# Patient Record
Sex: FEMALE | Race: BLACK OR AFRICAN AMERICAN | NOT HISPANIC OR LATINO | Employment: UNEMPLOYED | URBAN - METROPOLITAN AREA
[De-identification: names, ages, dates, MRNs, and addresses within clinical notes are randomized per-mention and may not be internally consistent; named-entity substitution may affect disease eponyms.]

---

## 2021-02-10 ENCOUNTER — ANCILLARY PROCEDURE (OUTPATIENT)
Dept: ULTRASOUND IMAGING | Facility: CLINIC | Age: 26
End: 2021-02-10
Attending: OBSTETRICS & GYNECOLOGY
Payer: COMMERCIAL

## 2021-02-10 ENCOUNTER — OFFICE VISIT (OUTPATIENT)
Dept: OBGYN | Facility: CLINIC | Age: 26
End: 2021-02-10
Attending: OBSTETRICS & GYNECOLOGY
Payer: COMMERCIAL

## 2021-02-10 VITALS
DIASTOLIC BLOOD PRESSURE: 69 MMHG | HEART RATE: 76 BPM | WEIGHT: 132.6 LBS | HEIGHT: 64 IN | BODY MASS INDEX: 22.64 KG/M2 | SYSTOLIC BLOOD PRESSURE: 120 MMHG

## 2021-02-10 DIAGNOSIS — Z23 FLU VACCINE NEED: ICD-10-CM

## 2021-02-10 DIAGNOSIS — Z34.91 NORMAL PREGNANCY IN FIRST TRIMESTER: ICD-10-CM

## 2021-02-10 DIAGNOSIS — G44.219 EPISODIC TENSION-TYPE HEADACHE, NOT INTRACTABLE: ICD-10-CM

## 2021-02-10 DIAGNOSIS — O21.9 NAUSEA AND VOMITING IN PREGNANCY: Primary | ICD-10-CM

## 2021-02-10 DIAGNOSIS — Z34.91 ENCOUNTER FOR SUPERVISION OF NORMAL PREGNANCY IN FIRST TRIMESTER, UNSPECIFIED GRAVIDITY: ICD-10-CM

## 2021-02-10 PROBLEM — G47.00 INSOMNIA: Status: ACTIVE | Noted: 2019-09-21

## 2021-02-10 PROBLEM — D64.9 ANEMIA: Status: ACTIVE | Noted: 2021-02-10

## 2021-02-10 PROBLEM — R51.9 HEADACHE: Status: ACTIVE | Noted: 2019-09-21

## 2021-02-10 PROBLEM — R06.9 ABNORMAL BREATHING: Status: ACTIVE | Noted: 2019-09-21

## 2021-02-10 LAB
ABO + RH BLD: NORMAL
ABO + RH BLD: NORMAL
BASOPHILS # BLD AUTO: 0 10E9/L (ref 0–0.2)
BASOPHILS NFR BLD AUTO: 0.4 %
BLD GP AB SCN SERPL QL: NORMAL
BLOOD BANK CMNT PATIENT-IMP: NORMAL
DIFFERENTIAL METHOD BLD: ABNORMAL
EOSINOPHIL # BLD AUTO: 0 10E9/L (ref 0–0.7)
EOSINOPHIL NFR BLD AUTO: 0.4 %
ERYTHROCYTE [DISTWIDTH] IN BLOOD BY AUTOMATED COUNT: 14.6 % (ref 10–15)
HCT VFR BLD AUTO: 34.3 % (ref 35–47)
HGB BLD-MCNC: 11 G/DL (ref 11.7–15.7)
IMM GRANULOCYTES # BLD: 0 10E9/L (ref 0–0.4)
IMM GRANULOCYTES NFR BLD: 0.2 %
LYMPHOCYTES # BLD AUTO: 1.9 10E9/L (ref 0.8–5.3)
LYMPHOCYTES NFR BLD AUTO: 36.2 %
MCH RBC QN AUTO: 28.4 PG (ref 26.5–33)
MCHC RBC AUTO-ENTMCNC: 32.1 G/DL (ref 31.5–36.5)
MCV RBC AUTO: 88 FL (ref 78–100)
MONOCYTES # BLD AUTO: 0.3 10E9/L (ref 0–1.3)
MONOCYTES NFR BLD AUTO: 5.4 %
NEUTROPHILS # BLD AUTO: 3.1 10E9/L (ref 1.6–8.3)
NEUTROPHILS NFR BLD AUTO: 57.4 %
NRBC # BLD AUTO: 0 10*3/UL
NRBC BLD AUTO-RTO: 0 /100
PLATELET # BLD AUTO: 171 10E9/L (ref 150–450)
RBC # BLD AUTO: 3.88 10E12/L (ref 3.8–5.2)
SPECIMEN EXP DATE BLD: NORMAL
WBC # BLD AUTO: 5.4 10E9/L (ref 4–11)

## 2021-02-10 PROCEDURE — 76801 OB US < 14 WKS SINGLE FETUS: CPT | Mod: 26 | Performed by: OBSTETRICS & GYNECOLOGY

## 2021-02-10 PROCEDURE — G0463 HOSPITAL OUTPT CLINIC VISIT: HCPCS | Mod: 25

## 2021-02-10 PROCEDURE — 86900 BLOOD TYPING SEROLOGIC ABO: CPT | Performed by: MIDWIFE

## 2021-02-10 PROCEDURE — 76801 OB US < 14 WKS SINGLE FETUS: CPT

## 2021-02-10 PROCEDURE — 86706 HEP B SURFACE ANTIBODY: CPT | Performed by: MIDWIFE

## 2021-02-10 PROCEDURE — G0008 ADMIN INFLUENZA VIRUS VAC: HCPCS

## 2021-02-10 PROCEDURE — 87340 HEPATITIS B SURFACE AG IA: CPT | Performed by: MIDWIFE

## 2021-02-10 PROCEDURE — 86780 TREPONEMA PALLIDUM: CPT | Performed by: MIDWIFE

## 2021-02-10 PROCEDURE — 86803 HEPATITIS C AB TEST: CPT | Performed by: MIDWIFE

## 2021-02-10 PROCEDURE — 99207 PR PRENATAL VISIT: CPT | Performed by: MIDWIFE

## 2021-02-10 PROCEDURE — 86850 RBC ANTIBODY SCREEN: CPT | Performed by: MIDWIFE

## 2021-02-10 PROCEDURE — 250N000011 HC RX IP 250 OP 636

## 2021-02-10 PROCEDURE — 82306 VITAMIN D 25 HYDROXY: CPT | Performed by: MIDWIFE

## 2021-02-10 PROCEDURE — 85025 COMPLETE CBC W/AUTO DIFF WBC: CPT | Performed by: MIDWIFE

## 2021-02-10 PROCEDURE — 83021 HEMOGLOBIN CHROMOTOGRAPHY: CPT | Performed by: MIDWIFE

## 2021-02-10 PROCEDURE — 87389 HIV-1 AG W/HIV-1&-2 AB AG IA: CPT | Performed by: MIDWIFE

## 2021-02-10 PROCEDURE — 86762 RUBELLA ANTIBODY: CPT | Performed by: MIDWIFE

## 2021-02-10 PROCEDURE — 90686 IIV4 VACC NO PRSV 0.5 ML IM: CPT

## 2021-02-10 PROCEDURE — 86901 BLOOD TYPING SEROLOGIC RH(D): CPT | Performed by: MIDWIFE

## 2021-02-10 PROCEDURE — 87086 URINE CULTURE/COLONY COUNT: CPT | Performed by: MIDWIFE

## 2021-02-10 PROCEDURE — 36415 COLL VENOUS BLD VENIPUNCTURE: CPT | Performed by: MIDWIFE

## 2021-02-10 RX ORDER — PRENATAL VIT/IRON FUM/FOLIC AC 27MG-0.8MG
1 TABLET ORAL DAILY
Qty: 90 TABLET | Refills: 3 | Status: SHIPPED | OUTPATIENT
Start: 2021-02-10 | End: 2021-05-27

## 2021-02-10 RX ORDER — PYRIDOXINE HCL (VITAMIN B6) 25 MG
25 TABLET ORAL 3 TIMES DAILY
Qty: 90 TABLET | Refills: 0 | Status: SHIPPED | OUTPATIENT
Start: 2021-02-10 | End: 2021-03-12

## 2021-02-10 RX ORDER — CHOLECALCIFEROL (VITAMIN D3) 50 MCG
1 TABLET ORAL DAILY
Qty: 90 TABLET | Refills: 3 | Status: SHIPPED | OUTPATIENT
Start: 2021-02-10 | End: 2021-05-27

## 2021-02-10 RX ORDER — ONDANSETRON 4 MG/1
4 TABLET, FILM COATED ORAL EVERY 8 HOURS PRN
Qty: 20 TABLET | Refills: 0 | Status: SHIPPED | OUTPATIENT
Start: 2021-02-10 | End: 2021-06-24

## 2021-02-10 RX ORDER — ACETAMINOPHEN 325 MG/1
650 TABLET ORAL EVERY 6 HOURS PRN
Qty: 100 TABLET | Refills: 0 | Status: ON HOLD | OUTPATIENT
Start: 2021-02-10 | End: 2021-09-05

## 2021-02-10 SDOH — HEALTH STABILITY: MENTAL HEALTH: HOW OFTEN DO YOU HAVE 6 OR MORE DRINKS ON ONE OCCASION?: NOT ASKED

## 2021-02-10 SDOH — HEALTH STABILITY: MENTAL HEALTH: HOW OFTEN DO YOU HAVE A DRINK CONTAINING ALCOHOL?: NOT ASKED

## 2021-02-10 SDOH — ECONOMIC STABILITY: TRANSPORTATION INSECURITY
IN THE PAST 12 MONTHS, HAS LACK OF TRANSPORTATION KEPT YOU FROM MEETINGS, WORK, OR FROM GETTING THINGS NEEDED FOR DAILY LIVING?: NOT ASKED

## 2021-02-10 SDOH — ECONOMIC STABILITY: FOOD INSECURITY: WITHIN THE PAST 12 MONTHS, THE FOOD YOU BOUGHT JUST DIDN'T LAST AND YOU DIDN'T HAVE MONEY TO GET MORE.: NEVER TRUE

## 2021-02-10 SDOH — ECONOMIC STABILITY: FOOD INSECURITY: WITHIN THE PAST 12 MONTHS, YOU WORRIED THAT YOUR FOOD WOULD RUN OUT BEFORE YOU GOT MONEY TO BUY MORE.: NEVER TRUE

## 2021-02-10 SDOH — HEALTH STABILITY: MENTAL HEALTH: HOW MANY STANDARD DRINKS CONTAINING ALCOHOL DO YOU HAVE ON A TYPICAL DAY?: NOT ASKED

## 2021-02-10 SDOH — ECONOMIC STABILITY: TRANSPORTATION INSECURITY
IN THE PAST 12 MONTHS, HAS THE LACK OF TRANSPORTATION KEPT YOU FROM MEDICAL APPOINTMENTS OR FROM GETTING MEDICATIONS?: NOT ASKED

## 2021-02-10 SDOH — ECONOMIC STABILITY: INCOME INSECURITY: HOW HARD IS IT FOR YOU TO PAY FOR THE VERY BASICS LIKE FOOD, HOUSING, MEDICAL CARE, AND HEATING?: NOT ASKED

## 2021-02-10 ASSESSMENT — MIFFLIN-ST. JEOR: SCORE: 1316.47

## 2021-02-10 ASSESSMENT — PAIN SCALES - GENERAL: PAINLEVEL: NO PAIN (0)

## 2021-02-10 NOTE — PROGRESS NOTES
[facility] OB Intake note  Subjective   26 year old femalepresents to clinic for initiation of OB care. Patient's last menstrual period was 2020 (exact date). No obstetric history on file. at Unknown by Estimated Date of Delivery: Sep 6, 2021 based on LMP c/w 10 week US. Reviewed dating ultrasound. Pregnancy is planned.     Partner name -  Janett.        Symptoms since LMP include nausea, vomiting and fatigue. Patient has tried these relief measures: small frequent meals, but usually vomits and increased rest. Reviewed B6 TID and Unisom HS daily. Zofran prn.     .  x3, denies complications aside from anemia during pregnancy.    - Having headaches mild- several times a week.     - Reports chest pain for the last month. Feels this after vomiting episodes only.     - Genetic/Infection questionnaire completed, risks include A.A..   Pt  does not have a recent known exposure to Parvo or CMV so IgG/IgM testing WILL NOT be ordered.   Recommended Flu Vaccine.  Flu Vaccine Given  Have you traveled during the pregnancy?No  Have your sexual partner(s) travelled during the pregnancy?No    - Current Medications    Current Outpatient Medications   Medication Sig Dispense Refill     acetaminophen (TYLENOL) 325 MG tablet Take 2 tablets (650 mg) by mouth every 6 hours as needed for mild pain 100 tablet 0     doxylamine (UNISOM SLEEPTABS) 25 MG TABS tablet Take 0.5-1 tablets (12.5-25 mg) by mouth At Bedtime 30 tablet 1     ondansetron (ZOFRAN) 4 MG tablet Take 1 tablet (4 mg) by mouth every 8 hours as needed for nausea or vomiting 20 tablet 0     Prenatal Vit-Fe Fumarate-FA (PRENATAL MULTIVITAMIN W/IRON) 27-0.8 MG tablet Take 1 tablet by mouth daily 90 tablet 3     pyridOXINE (VITAMIN B6) 25 MG tablet Take 1 tablet (25 mg) by mouth 3 times daily 90 tablet 0     vitamin D3 (CHOLECALCIFEROL) 50 mcg (2000 units) tablet Take 1 tablet (50 mcg) by mouth daily Take one tablet daily. 90 tablet 3         - Co-morbids    Past  Medical History:   Diagnosis Date     Known health problems: none      - Risk for GDM : First degree relative with GDM or DM so  WILL have an early GCT and Hgb A1C    - High risk factors for Pre E-  No known risk factors of High risk for Pre E       - Moderate risk factor for Pre E Sociodemographic characteristics (Racism, Less access given lower SES)  Meets one high risk factors or one of the moderate risk facrtors  so WILL NOT consider starting low dose aspirin (81mg) starting between 12 and 28 weeks to prevent early onset preeclampsia    - The patient  does not have a history of spontaneous  birth so  WILL NOT consider progesterone starting at 16-20 weeks and/or serial transvaginal cervical length ultrasounds from 16-24 weeks.     -The patient does not have a history of immunosuppresion or HIV so Toxoplasma IgG/IgM WILL NOT be ordered.     PERSONAL/SOCIAL HISTORY  , lives with 3 kids.  lives out of state. New dad than other three children. Feels safe, denies concerns.   Employment: Homemaker.    History of anxiety or depression  Denies  Additional items: Has been given information regarding WIC    Objective  -VS: reviewed and within normal limits   -General appearance: no acute distress, patient is comfortable   NEUROLOGICAL/PSYCHIATRIC   - Orientated x3,   -Mood and affect: : normal     Assessment/Plan  Xiao was seen today for prenatal care.    Diagnoses and all orders for this visit:    Nausea and vomiting in pregnancy  -     pyridOXINE (VITAMIN B6) 25 MG tablet; Take 1 tablet (25 mg) by mouth 3 times daily  -     doxylamine (UNISOM SLEEPTABS) 25 MG TABS tablet; Take 0.5-1 tablets (12.5-25 mg) by mouth At Bedtime  -     ondansetron (ZOFRAN) 4 MG tablet; Take 1 tablet (4 mg) by mouth every 8 hours as needed for nausea or vomiting    Normal pregnancy in first trimester  -     MAT FETAL MED CTR REFERRAL-PREGNANCY  -     Vitamin D Deficiency  -     ABO/Rh type and screen  -     CBC with  platelets differential  -     Hepatitis B surface antigen  -     Hepatitis C antibody  -     HIV Antigen Antibody Combo  -     Rubella Antibody IgG Quantitative  -     Treponema Abs w Reflex to RPR and Titer  -     Urine Culture Aerobic Bacterial  -     Hepatitis B Surface Antibody  -     pyridOXINE (VITAMIN B6) 25 MG tablet; Take 1 tablet (25 mg) by mouth 3 times daily  -     doxylamine (UNISOM SLEEPTABS) 25 MG TABS tablet; Take 0.5-1 tablets (12.5-25 mg) by mouth At Bedtime  -     ondansetron (ZOFRAN) 4 MG tablet; Take 1 tablet (4 mg) by mouth every 8 hours as needed for nausea or vomiting  -     Prenatal Vit-Fe Fumarate-FA (PRENATAL MULTIVITAMIN W/IRON) 27-0.8 MG tablet; Take 1 tablet by mouth daily  -     vitamin D3 (CHOLECALCIFEROL) 50 mcg (2000 units) tablet; Take 1 tablet (50 mcg) by mouth daily Take one tablet daily.  -     acetaminophen (TYLENOL) 325 MG tablet; Take 2 tablets (650 mg) by mouth every 6 hours as needed for mild pain  -     HGB Eval Reflex to ELP or RBC Solubility    Episodic tension-type headache, not intractable  -     acetaminophen (TYLENOL) 325 MG tablet; Take 2 tablets (650 mg) by mouth every 6 hours as needed for mild pain      26 year old No obstetric history on file. Unknown weeks of pregnancy with CARLEY of Sep 6, 2021 by LMP of Patient's last menstrual period was 11/30/2020 (exact date).. Ultrasound confirms.   Outpatient Encounter Medications as of 2/10/2021   Medication Sig Dispense Refill     acetaminophen (TYLENOL) 325 MG tablet Take 2 tablets (650 mg) by mouth every 6 hours as needed for mild pain 100 tablet 0     doxylamine (UNISOM SLEEPTABS) 25 MG TABS tablet Take 0.5-1 tablets (12.5-25 mg) by mouth At Bedtime 30 tablet 1     ondansetron (ZOFRAN) 4 MG tablet Take 1 tablet (4 mg) by mouth every 8 hours as needed for nausea or vomiting 20 tablet 0     Prenatal Vit-Fe Fumarate-FA (PRENATAL MULTIVITAMIN W/IRON) 27-0.8 MG tablet Take 1 tablet by mouth daily 90 tablet 3     pyridOXINE  (VITAMIN B6) 25 MG tablet Take 1 tablet (25 mg) by mouth 3 times daily 90 tablet 0     vitamin D3 (CHOLECALCIFEROL) 50 mcg (2000 units) tablet Take 1 tablet (50 mcg) by mouth daily Take one tablet daily. 90 tablet 3     No facility-administered encounter medications on file as of 2/10/2021.       Orders Placed This Encounter   Procedures     Vitamin D Deficiency     CBC with platelets differential     Hepatitis B surface antigen     Hepatitis C antibody     HIV Antigen Antibody Combo     Rubella Antibody IgG Quantitative     Treponema Abs w Reflex to RPR and Titer     Hepatitis B Surface Antibody     HGB Eval Reflex to ELP or RBC Solubility     MAT FETAL MED CTR REFERRAL-PREGNANCY     ABO/Rh type and screen     - Oriented to Practice, types of care, and how to reach a provider.  Pt prefers CNM team  - Patient received 1st trimester new OB education packet complete with aide of The Expectant Family booklet including information on genetic screening test options.  - Patient desires level II ultrasound which was ordered.  - Educational handout on the prevention of infections diseases during pregnancy provided.  - Patient was encouraged to start prenatal vitamins as tolerated.    - Patient was sent to lab for routine OB labs including ELP.   - Reviewed risk for diabetes in pregnancy, pt agrees to early 1 hour - plan for NOB visit.  - Pregnancy concerns to be addressed by provider at new OB exam include: early glucose screen.    Pt to RTO for NOB visit in 4 weeks and prn if questions or concerns    EDY Cho CNM

## 2021-02-10 NOTE — NURSING NOTE
Chief Complaint   Patient presents with     Prenatal Care     Pt c/o bitterness in mouth and chest pain for last month.  Pt also c/o vomiting every time she eats.

## 2021-02-10 NOTE — LETTER
2/10/2021     RE: Xiao Maguire  [address]  [address]  [place] [facility] [address]     Dear Colleague,    Thank you for referring your patient, Xiao Maguire, to the [facility] [place] at [facility]. Please see a copy of my visit note below.    [facility] OB Intake note  Subjective   26 year old femalepresents to clinic for initiation of OB care. Patient's last menstrual period was 2020 (exact date). No obstetric history on file. at Unknown by Estimated Date of Delivery: Sep 6, 2021 based on LMP c/w 10 week US. Reviewed dating ultrasound. Pregnancy is planned.     Partner name -  Janett.        Symptoms since LMP include nausea, vomiting and fatigue. Patient has tried these relief measures: small frequent meals, but usually vomits and increased rest. Reviewed B6 TID and Unisom HS daily. Zofran prn.     .  x3, denies complications aside from anemia during pregnancy.    - Having headaches mild- several times a week.     - Reports chest pain for the last month. Feels this after vomiting episodes only.     - Genetic/Infection questionnaire completed, risks include A.A..   Pt  does not have a recent known exposure to Parvo or CMV so IgG/IgM testing WILL NOT be ordered.   Recommended Flu Vaccine.  Flu Vaccine Given  Have you traveled during the pregnancy?No  Have your sexual partner(s) travelled during the pregnancy?No    - Current Medications    Current Outpatient Medications   Medication Sig Dispense Refill     acetaminophen (TYLENOL) 325 MG tablet Take 2 tablets (650 mg) by mouth every 6 hours as needed for mild pain 100 tablet 0     doxylamine (UNISOM SLEEPTABS) 25 MG TABS tablet Take 0.5-1 tablets (12.5-25 mg) by mouth At Bedtime 30 tablet 1     ondansetron (ZOFRAN) 4 MG tablet Take 1 tablet (4 mg) by mouth every 8 hours as needed for nausea or vomiting 20 tablet 0     Prenatal Vit-Fe Fumarate-FA (PRENATAL MULTIVITAMIN W/IRON)  27-0.8 MG tablet Take 1 tablet by mouth daily 90 tablet 3     pyridOXINE (VITAMIN B6) 25 MG tablet Take 1 tablet (25 mg) by mouth 3 times daily 90 tablet 0     vitamin D3 (CHOLECALCIFEROL) 50 mcg (2000 units) tablet Take 1 tablet (50 mcg) by mouth daily Take one tablet daily. 90 tablet 3         - Co-morbids    Past Medical History:   Diagnosis Date     Known health problems: none      - Risk for GDM : First degree relative with GDM or DM so  WILL have an early GCT and Hgb A1C    - High risk factors for Pre E-  No known risk factors of High risk for Pre E       - Moderate risk factor for Pre E Sociodemographic characteristics (Racism, Less access given lower SES)  Meets one high risk factors or one of the moderate risk facrtors  so WILL NOT consider starting low dose aspirin (81mg) starting between 12 and 28 weeks to prevent early onset preeclampsia    - The patient  does not have a history of spontaneous  birth so  WILL NOT consider progesterone starting at 16-20 weeks and/or serial transvaginal cervical length ultrasounds from 16-24 weeks.     -The patient does not have a history of immunosuppresion or HIV so Toxoplasma IgG/IgM WILL NOT be ordered.     PERSONAL/SOCIAL HISTORY  , lives with 3 kids.  lives out of state. New dad than other three children. Feels safe, denies concerns.   Employment: Homemaker.    History of anxiety or depression  Denies  Additional items: Has been given information regarding WIC    Objective  -VS: reviewed and within normal limits   -General appearance: no acute distress, patient is comfortable   NEUROLOGICAL/PSYCHIATRIC   - Orientated x3,   -Mood and affect: : normal     Assessment/Plan  Xiao was seen today for prenatal care.    Diagnoses and all orders for this visit:    Nausea and vomiting in pregnancy  -     pyridOXINE (VITAMIN B6) 25 MG tablet; Take 1 tablet (25 mg) by mouth 3 times daily  -     doxylamine (UNISOM SLEEPTABS) 25 MG TABS tablet; Take 0.5-1  tablets (12.5-25 mg) by mouth At Bedtime  -     ondansetron (ZOFRAN) 4 MG tablet; Take 1 tablet (4 mg) by mouth every 8 hours as needed for nausea or vomiting    Normal pregnancy in first trimester  -     MAT FETAL MED CTR REFERRAL-PREGNANCY  -     Vitamin D Deficiency  -     ABO/Rh type and screen  -     CBC with platelets differential  -     Hepatitis B surface antigen  -     Hepatitis C antibody  -     HIV Antigen Antibody Combo  -     Rubella Antibody IgG Quantitative  -     Treponema Abs w Reflex to RPR and Titer  -     Urine Culture Aerobic Bacterial  -     Hepatitis B Surface Antibody  -     pyridOXINE (VITAMIN B6) 25 MG tablet; Take 1 tablet (25 mg) by mouth 3 times daily  -     doxylamine (UNISOM SLEEPTABS) 25 MG TABS tablet; Take 0.5-1 tablets (12.5-25 mg) by mouth At Bedtime  -     ondansetron (ZOFRAN) 4 MG tablet; Take 1 tablet (4 mg) by mouth every 8 hours as needed for nausea or vomiting  -     Prenatal Vit-Fe Fumarate-FA (PRENATAL MULTIVITAMIN W/IRON) 27-0.8 MG tablet; Take 1 tablet by mouth daily  -     vitamin D3 (CHOLECALCIFEROL) 50 mcg (2000 units) tablet; Take 1 tablet (50 mcg) by mouth daily Take one tablet daily.  -     acetaminophen (TYLENOL) 325 MG tablet; Take 2 tablets (650 mg) by mouth every 6 hours as needed for mild pain  -     HGB Eval Reflex to ELP or RBC Solubility    Episodic tension-type headache, not intractable  -     acetaminophen (TYLENOL) 325 MG tablet; Take 2 tablets (650 mg) by mouth every 6 hours as needed for mild pain      26 year old No obstetric history on file. Unknown weeks of pregnancy with CARLEY of Sep 6, 2021 by LMP of Patient's last menstrual period was 11/30/2020 (exact date).. Ultrasound confirms.   Outpatient Encounter Medications as of 2/10/2021   Medication Sig Dispense Refill     acetaminophen (TYLENOL) 325 MG tablet Take 2 tablets (650 mg) by mouth every 6 hours as needed for mild pain 100 tablet 0     doxylamine (UNISOM SLEEPTABS) 25 MG TABS tablet Take 0.5-1  tablets (12.5-25 mg) by mouth At Bedtime 30 tablet 1     ondansetron (ZOFRAN) 4 MG tablet Take 1 tablet (4 mg) by mouth every 8 hours as needed for nausea or vomiting 20 tablet 0     Prenatal Vit-Fe Fumarate-FA (PRENATAL MULTIVITAMIN W/IRON) 27-0.8 MG tablet Take 1 tablet by mouth daily 90 tablet 3     pyridOXINE (VITAMIN B6) 25 MG tablet Take 1 tablet (25 mg) by mouth 3 times daily 90 tablet 0     vitamin D3 (CHOLECALCIFEROL) 50 mcg (2000 units) tablet Take 1 tablet (50 mcg) by mouth daily Take one tablet daily. 90 tablet 3     No facility-administered encounter medications on file as of 2/10/2021.       Orders Placed This Encounter   Procedures     Vitamin D Deficiency     CBC with platelets differential     Hepatitis B surface antigen     Hepatitis C antibody     HIV Antigen Antibody Combo     Rubella Antibody IgG Quantitative     Treponema Abs w Reflex to RPR and Titer     Hepatitis B Surface Antibody     HGB Eval Reflex to ELP or RBC Solubility     MAT FETAL MED CTR REFERRAL-PREGNANCY     ABO/Rh type and screen     - Oriented to Practice, types of care, and how to reach a provider.  Pt prefers CNM team  - Patient received 1st trimester new OB education packet complete with aide of The Expectant Family booklet including information on genetic screening test options.  - Patient desires level II ultrasound which was ordered.  - Educational handout on the prevention of infections diseases during pregnancy provided.  - Patient was encouraged to start prenatal vitamins as tolerated.    - Patient was sent to lab for routine OB labs including ELP.   - Reviewed risk for diabetes in pregnancy, pt agrees to early 1 hour - plan for NOB visit.  - Pregnancy concerns to be addressed by provider at new OB exam include: early glucose screen.    Pt to RTO for NOB visit in 4 weeks and prn if questions or concerns  EDY Cho CNM

## 2021-02-11 ENCOUNTER — APPOINTMENT (OUTPATIENT)
Dept: INTERPRETER SERVICES | Facility: CLINIC | Age: 26
End: 2021-02-11
Payer: COMMERCIAL

## 2021-02-11 PROBLEM — E55.9 VITAMIN D DEFICIENCY: Status: ACTIVE | Noted: 2021-02-11

## 2021-02-11 LAB
BACTERIA SPEC CULT: NO GROWTH
DEPRECATED CALCIDIOL+CALCIFEROL SERPL-MC: 21 UG/L (ref 20–75)
HBV SURFACE AB SERPL IA-ACNC: 135.12 M[IU]/ML
HBV SURFACE AG SERPL QL IA: NONREACTIVE
HCV AB SERPL QL IA: NONREACTIVE
HIV 1+2 AB+HIV1 P24 AG SERPL QL IA: NONREACTIVE
Lab: NORMAL
RUBV IGG SERPL IA-ACNC: 181 IU/ML
SPECIMEN SOURCE: NORMAL
T PALLIDUM AB SER QL: NONREACTIVE

## 2021-02-13 LAB
HGB A1 MFR BLD: 96.6 % (ref 95–97.9)
HGB A2 MFR BLD: 2.9 % (ref 2–3.5)
HGB C MFR BLD: 0 % (ref 0–0)
HGB E MFR BLD: 0 % (ref 0–0)
HGB F MFR BLD: 0.5 % (ref 0–2.1)
HGB FRACT BLD ELPH-IMP: NORMAL
HGB OTHER MFR BLD: 0 % (ref 0–0)
HGB S BLD QL SOLY: NORMAL
HGB S MFR BLD: 0 % (ref 0–0)
PATH INTERP BLD-IMP: NORMAL

## 2021-02-16 PROBLEM — O21.9 NAUSEA AND VOMITING IN PREGNANCY: Status: ACTIVE | Noted: 2021-02-16

## 2021-02-16 PROBLEM — Z34.91 NORMAL PREGNANCY IN FIRST TRIMESTER: Status: ACTIVE | Noted: 2021-02-16

## 2021-02-23 ENCOUNTER — APPOINTMENT (OUTPATIENT)
Dept: INTERPRETER SERVICES | Facility: CLINIC | Age: 26
End: 2021-02-23
Payer: COMMERCIAL

## 2021-02-25 ENCOUNTER — APPOINTMENT (OUTPATIENT)
Dept: INTERPRETER SERVICES | Facility: CLINIC | Age: 26
End: 2021-02-25
Payer: COMMERCIAL

## 2021-02-25 NOTE — RESULT ENCOUNTER NOTE
Tried to reach with [facility]  on the line. Reached voicemail. Message left asking patient to call back to S to discuss recommendation for PNV with iron and vitamin D supplement (prescriptions sent to pharmacy on 2/10). Patient also needs to schedule for RICKY Owens RN

## 2021-04-05 ENCOUNTER — PRE VISIT (OUTPATIENT)
Dept: MATERNAL FETAL MEDICINE | Facility: CLINIC | Age: 26
End: 2021-04-05

## 2021-04-14 ENCOUNTER — HOSPITAL ENCOUNTER (OUTPATIENT)
Dept: ULTRASOUND IMAGING | Facility: CLINIC | Age: 26
End: 2021-04-14
Attending: MIDWIFE
Payer: COMMERCIAL

## 2021-04-14 ENCOUNTER — OFFICE VISIT (OUTPATIENT)
Dept: MATERNAL FETAL MEDICINE | Facility: CLINIC | Age: 26
End: 2021-04-14
Attending: MIDWIFE
Payer: COMMERCIAL

## 2021-04-14 DIAGNOSIS — Z36.89 ENCOUNTER FOR FETAL ANATOMIC SURVEY: Primary | ICD-10-CM

## 2021-04-14 DIAGNOSIS — O26.90 PREGNANCY RELATED CONDITION, ANTEPARTUM: ICD-10-CM

## 2021-04-14 PROCEDURE — 76805 OB US >/= 14 WKS SNGL FETUS: CPT

## 2021-04-14 PROCEDURE — 76805 OB US >/= 14 WKS SNGL FETUS: CPT | Mod: 26 | Performed by: OBSTETRICS & GYNECOLOGY

## 2021-04-14 NOTE — PROGRESS NOTES
Please see full imaging report from ViewPoint program under imaging tab.    Normal anatomic survey.     Guillermina Owens MD  Maternal Fetal Medicine

## 2021-04-29 ENCOUNTER — OFFICE VISIT (OUTPATIENT)
Dept: OBGYN | Facility: CLINIC | Age: 26
End: 2021-04-29
Attending: ADVANCED PRACTICE MIDWIFE
Payer: COMMERCIAL

## 2021-04-29 VITALS
DIASTOLIC BLOOD PRESSURE: 72 MMHG | HEART RATE: 88 BPM | WEIGHT: 135 LBS | SYSTOLIC BLOOD PRESSURE: 109 MMHG | BODY MASS INDEX: 23.17 KG/M2

## 2021-04-29 DIAGNOSIS — E55.9 VITAMIN D DEFICIENCY: ICD-10-CM

## 2021-04-29 DIAGNOSIS — Z34.91 NORMAL PREGNANCY IN FIRST TRIMESTER: ICD-10-CM

## 2021-04-29 DIAGNOSIS — Z83.3 FAMILY HISTORY OF DIABETES MELLITUS IN MOTHER: ICD-10-CM

## 2021-04-29 DIAGNOSIS — Z34.91 ENCOUNTER FOR SUPERVISION OF NORMAL PREGNANCY IN FIRST TRIMESTER, UNSPECIFIED GRAVIDITY: Primary | ICD-10-CM

## 2021-04-29 PROCEDURE — G0463 HOSPITAL OUTPT CLINIC VISIT: HCPCS

## 2021-04-29 PROCEDURE — 99207 PR PRENATAL VISIT: CPT | Performed by: ADVANCED PRACTICE MIDWIFE

## 2021-04-29 RX ORDER — PRENATAL VIT/IRON FUM/FOLIC AC 27MG-0.8MG
1 TABLET ORAL DAILY
Qty: 90 TABLET | Refills: 3 | Status: SHIPPED | OUTPATIENT
Start: 2021-04-29 | End: 2021-05-27

## 2021-04-29 NOTE — NURSING NOTE
Chief Complaint   Patient presents with     Prenatal Care     CRYSTAL 21 weeks and 3 days   Roshni Hercules LPN

## 2021-04-29 NOTE — LETTER
"2021       RE: Xiao Maguire  [address]  [address]  [facility] [address]     Dear Colleague,    Thank you for referring your patient, Xiao Maguire, to the [facility] [place] at [facility]. Please see a copy of my visit note below.    SUBJECTIVE:    26 year old, female, , 21w3d,  who presents to the clinic today for a new ob visit.   Feels well. Has started PNV, but states that she has run out.  Estimated Date of Delivery: Sep 6, 2021   Sep 6, 2021 is calculated from Patient's last menstrual period was 2020 (exact date).  She has not had bleeding since her LMP.   She has not had nausea. Weight loss has not occurred.   This was a planned pregnancy.   FOB is involved,  States  lives out of town, they last saw each other 2 months ago. Pt came with her brother today   Has not been here since intake    OTHER CONCERNS:   Appetite - Concerned that she is not eating enough. Dizziness and headache with food.   Eats meat only. \"normal Mauritian food\"  Difficulty sleeping - difficulty falling asleep. Has not tried anything to help.  Ramadan - wondering if it is okay for her to fast.   ===========================================  ROS  PSYCHIATRIC:  Denies   PHQ9: Last PHQ-9 score on record= No Value exists for the : HP#PHQ9  Social History     Tobacco Use     Smoking status: Never Smoker     Smokeless tobacco: Never Used   Substance Use Topics     Alcohol use: Not Currently     History   Drug Use Unknown     History   Smoking Status     Never Smoker   Smokeless Tobacco     Never Used     Social History    Substance and Sexual Activity      Alcohol use: Not Currently    Family History   Problem Relation Age of Onset     Diabetes Mother      No Known Problems Father      No Known Problems Sister      No Known Problems Brother      No Known Problems Brother      No Known Problems Brother      No Known Problems Brother      No " Known Problems Brother      No Known Problems Sister      No Known Problems Sister      No Known Problems Sister      ============================================  MEDICAL HISTORY   No Known Allergies      Current Outpatient Medications:      acetaminophen (TYLENOL) 325 MG tablet, Take 2 tablets (650 mg) by mouth every 6 hours as needed for mild pain, Disp: 100 tablet, Rfl: 0     Cholecalciferol (VITAMIN D) 50 MCG (2000 UT) CAPS, Take 2 capsules by mouth daily Take two capsules daily., Disp: 180 capsule, Rfl: 3     doxylamine (UNISOM SLEEPTABS) 25 MG TABS tablet, Take 0.5-1 tablets (12.5-25 mg) by mouth At Bedtime, Disp: 30 tablet, Rfl: 1     ondansetron (ZOFRAN) 4 MG tablet, Take 1 tablet (4 mg) by mouth every 8 hours as needed for nausea or vomiting, Disp: 20 tablet, Rfl: 0     Prenatal Vit-Fe Fumarate-FA (PRENATAL MULTIVITAMIN W/IRON) 27-0.8 MG tablet, Take 1 tablet by mouth daily, Disp: 90 tablet, Rfl: 3     Prenatal Vit-Fe Fumarate-FA (PRENATAL MULTIVITAMIN W/IRON) 27-0.8 MG tablet, Take 1 tablet by mouth daily, Disp: 90 tablet, Rfl: 3     vitamin D3 (CHOLECALCIFEROL) 50 mcg (2000 units) tablet, Take 1 tablet (50 mcg) by mouth daily Take one tablet daily., Disp: 90 tablet, Rfl: 3    Past Medical History:   Diagnosis Date     Known health problems: none        Past Surgical History:   Procedure Laterality Date     NO HISTORY OF SURGERY            OB History    Para Term  AB Living   4 3 3 0 0 3   SAB TAB Ectopic Multiple Live Births   0 0 0 0 3      # Outcome Date GA Lbr Shashi/2nd Weight Sex Delivery Anes PTL Lv   4 Current            3 Term 12/04/15 40w0d  2.722 kg (6 lb) F  EPI  SIMONA   2 Term 10/22/13 40w0d  2.722 kg (6 lb) F  None  SIMONA   1 Term 12 40w0d  2.892 kg (6 lb 6 oz) M  EPI  SIMONA      Obstetric Comments   No HTN, GDM, PPD, or PPH. Anemia in pregnancy. First in [place] and 2nd and 3th in [place].        GYN History- Denies Abnormal Pap Smears; last 3 years ago per  pt.                        Cervical procedures: Denies                        History of STI: Denies    I personally reviewed the past social/family/medical and surgical history on the date of service.   I reviewed lab work done at Intake visit with patient.    OBJECTIVE:   PHYSICAL EXAM:  /72   Pulse 88   Wt 61.2 kg (135 lb)   LMP 2020 (Exact Date)   Breastfeeding No   BMI 23.17 kg/m    BMI- Body mass index is 23.17 kg/m .,     GENERAL:  Pleasant pregnant female, alert, cooperative  and well groomed.  SKIN:  Warm and dry, without lesions or rashes  HEAD: Symmetrical features.  NECK:  Thyroid without enlargement and nodules.  Lymph nodes not palpable.   LUNGS:  Clear to auscultation.  BREAST:   No dominant, fixed or suspicious masses are noted.  No skin or nipple changes or axillary nodes.  Nipples everted.      HEART:  RRR. Grade 2 murmur noted on S1  ABDOMEN: Soft without masses , tenderness or organomegaly. Uterus palpable at size equal to dates.  No scars noted. Fetal heart tones present.  MUSCULOSKELETAL:  Full range of motion  EXTREMITIES:  No edema. No significant varicosities.   PELVIC EXAM:  GENITALIA: EGBUS  External genitalia, Bartholin's glands, urethra & [place]'s glands:normal. Vulva reveals no erythema or lesions.    VAGINA:  pink, normal rugae and discharge, no lesions, good tone.   CERVIX:  Closed, firm, without CMT.             UTERUS: nontender. Measuring at 23 cm.   ADNEXA:  Without masses or tenderness.   RECTAL:  Normal appearance.  Digital exam deferred.    ASSESSMENT:  Intrauterine pregnancy 21w3d size consistent with dates  Genetic Screening: Level 2 Ultrasound completed  Encounter Diagnoses   Name Primary?     Encounter for supervision of normal pregnancy in first trimester, unspecified  Yes     Normal pregnancy in first trimester      Family history of diabetes mellitus in mother      Vitamin D deficiency         PLAN:  Orders Placed This Encounter   Medications      Prenatal Vit-Fe Fumarate-FA (PRENATAL MULTIVITAMIN W/IRON) 27-0.8 MG tablet     Sig: Take 1 tablet by mouth daily     Dispense:  90 tablet     Refill:  3     Cholecalciferol (VITAMIN D) 50 MCG (2000) CAPS     Sig: Take 2 capsules by mouth daily Take two capsules daily.     Dispense:  180 capsule     Refill:  3   Xiao was seen in clinic for NOB at 21w3d with Vietnamese  called by phone. She is feeling well overall.    - Appetite: Encouraged patient to continue to eat as she is comfortable and to add in other vegetables and fruit as tolerated.     - Fasting: Discussed a risk-benefit approach to decision making around fasting for adan. Discussed risk of hypoglycemia and dehydration to mother and fetus. Risk reduction strategies including sleeping later in the day to decrease hours without nutrition and awareness of health and snacking as needed if feeling unwell.     - Reviewed use of triage nurse line and contacting the on-call provider after hours for an urgent need such as fever, vaginal bleeding, bladder or vaginal infection, rupture of membranes,  or term labor.    - Reviewed best evidence for: weight gain for her weight and height for pregnancy:  RECOMMENDED WEIGHT GAIN: 25-35 lbs.   healthy diet and foods to avoid; exercise and activity during pregnancy;avoiding exposure to toxoplasmosis; and maintenance of a generally healthy lifestyle.   - Discussed the harms, benefits, side effects and alternative therapies for current prescribed and OTC medications.    - All pt'squestions discussed and answered.  Pt verbalized understanding of and agreement to plan of care.     - Continue scheduled prenatal care and prn if questions or concerns  Will do GCT next visit, too late for early GCT. Discussed EOB and Gct next visit    IDotty SNM, am serving as a scribe; to document services personally performed by  Maribell Pettit CNM based on data collection and the provider's statements to  me.     Dotty Thomas, BETTY      I agree with the PFSH and ROS as completed by the student, except for changes made by me. The remainder of the encounter was performed by me and scribed by the student. The scribed note accurately reflects my personal services and decisions made by me.  Maribell Pettit, DELGADO, CNM, APRN

## 2021-05-05 PROBLEM — R51.9 HEADACHE: Status: RESOLVED | Noted: 2019-09-21 | Resolved: 2021-05-05

## 2021-05-05 PROBLEM — G47.00 INSOMNIA: Status: RESOLVED | Noted: 2019-09-21 | Resolved: 2021-05-05

## 2021-05-05 PROBLEM — Z83.3 FAMILY HISTORY OF DIABETES MELLITUS IN MOTHER: Status: ACTIVE | Noted: 2021-05-05

## 2021-05-05 PROBLEM — R06.9 ABNORMAL BREATHING: Status: RESOLVED | Noted: 2019-09-21 | Resolved: 2021-05-05

## 2021-05-05 RX ORDER — MULTIVIT-MIN/IRON/FOLIC ACID/K 18-600-40
2 CAPSULE ORAL DAILY
Qty: 180 CAPSULE | Refills: 3 | Status: SHIPPED | OUTPATIENT
Start: 2021-05-05 | End: 2023-09-14

## 2021-05-27 ENCOUNTER — OFFICE VISIT (OUTPATIENT)
Dept: OBGYN | Facility: CLINIC | Age: 26
End: 2021-05-27
Attending: ADVANCED PRACTICE MIDWIFE
Payer: COMMERCIAL

## 2021-05-27 VITALS
DIASTOLIC BLOOD PRESSURE: 58 MMHG | SYSTOLIC BLOOD PRESSURE: 96 MMHG | WEIGHT: 141 LBS | HEIGHT: 64 IN | BODY MASS INDEX: 24.07 KG/M2 | HEART RATE: 81 BPM

## 2021-05-27 DIAGNOSIS — Z34.91 NORMAL PREGNANCY IN FIRST TRIMESTER: ICD-10-CM

## 2021-05-27 LAB
BASOPHILS # BLD AUTO: 0 10E9/L (ref 0–0.2)
BASOPHILS NFR BLD AUTO: 0.5 %
DIFFERENTIAL METHOD BLD: ABNORMAL
EOSINOPHIL # BLD AUTO: 0.1 10E9/L (ref 0–0.7)
EOSINOPHIL NFR BLD AUTO: 1 %
ERYTHROCYTE [DISTWIDTH] IN BLOOD BY AUTOMATED COUNT: 14.6 % (ref 10–15)
GLUCOSE 1H P 50 G GLC PO SERPL-MCNC: 129 MG/DL (ref 60–129)
HCT VFR BLD AUTO: 27 % (ref 35–47)
HGB BLD-MCNC: 8.4 G/DL (ref 11.7–15.7)
IMM GRANULOCYTES # BLD: 0.1 10E9/L (ref 0–0.4)
IMM GRANULOCYTES NFR BLD: 0.6 %
LYMPHOCYTES # BLD AUTO: 2.2 10E9/L (ref 0.8–5.3)
LYMPHOCYTES NFR BLD AUTO: 27.4 %
MCH RBC QN AUTO: 26.4 PG (ref 26.5–33)
MCHC RBC AUTO-ENTMCNC: 31.1 G/DL (ref 31.5–36.5)
MCV RBC AUTO: 85 FL (ref 78–100)
MONOCYTES # BLD AUTO: 0.4 10E9/L (ref 0–1.3)
MONOCYTES NFR BLD AUTO: 5.3 %
NEUTROPHILS # BLD AUTO: 5.2 10E9/L (ref 1.6–8.3)
NEUTROPHILS NFR BLD AUTO: 65.2 %
NRBC # BLD AUTO: 0 10*3/UL
NRBC BLD AUTO-RTO: 0 /100
PLATELET # BLD AUTO: 141 10E9/L (ref 150–450)
RBC # BLD AUTO: 3.18 10E12/L (ref 3.8–5.2)
WBC # BLD AUTO: 8 10E9/L (ref 4–11)

## 2021-05-27 PROCEDURE — 82306 VITAMIN D 25 HYDROXY: CPT | Performed by: ADVANCED PRACTICE MIDWIFE

## 2021-05-27 PROCEDURE — 85025 COMPLETE CBC W/AUTO DIFF WBC: CPT | Performed by: ADVANCED PRACTICE MIDWIFE

## 2021-05-27 PROCEDURE — 99207 PR PRENATAL VISIT: CPT | Performed by: ADVANCED PRACTICE MIDWIFE

## 2021-05-27 PROCEDURE — 82950 GLUCOSE TEST: CPT | Performed by: ADVANCED PRACTICE MIDWIFE

## 2021-05-27 PROCEDURE — 86780 TREPONEMA PALLIDUM: CPT | Performed by: ADVANCED PRACTICE MIDWIFE

## 2021-05-27 PROCEDURE — G0463 HOSPITAL OUTPT CLINIC VISIT: HCPCS

## 2021-05-27 PROCEDURE — 36415 COLL VENOUS BLD VENIPUNCTURE: CPT | Performed by: ADVANCED PRACTICE MIDWIFE

## 2021-05-27 RX ORDER — PRENATAL VIT/IRON FUM/FOLIC AC 27MG-0.8MG
1 TABLET ORAL DAILY
Qty: 90 TABLET | Refills: 3 | Status: SHIPPED | OUTPATIENT
Start: 2021-05-27 | End: 2023-10-17

## 2021-05-27 ASSESSMENT — PAIN SCALES - GENERAL: PAINLEVEL: NO PAIN (0)

## 2021-05-27 ASSESSMENT — MIFFLIN-ST. JEOR: SCORE: 1354.57

## 2021-05-27 NOTE — PROGRESS NOTES
Subjective:  26 year old, , 25w3d, presents for prenatal visit.   + fetal movement. Denies cramping/contractions. Denies leaking of fluid or vaginal bleeding.   The patient presents with the following concerns: feeling overall well   - Reports occasional dizziness when standing, resolves on own. Denies LOC.   - Questions re: CNM practice and staffing in hospital.  Is important to patient to know provider at labor/birth    No flowsheet data found.  Education completed today includes breast feeding, [facility] hand out , contraception, counting movements, signs of pre-term labor, when to present to birthplace, post partum depression, GBS, getting enough iron and labor induction.  Birth preferences reviewed: flexible, reviewed all options  Labor support:  Mother or brother, partner is out of country  [place] Feeding plans: Breastfeeding  Contraception planned: none  The following labs were ordered today: GCT, CBC w platelets, Vitamin D and Anti-treponema  Water birth consent form was not given.    Blood type:   ABO   Date Value Ref Range Status   02/10/2021 O  Final     RH(D)   Date Value Ref Range Status   02/10/2021 Pos  Final     Antibody Screen   Date Value Ref Range Status   02/10/2021 Neg  Final   Rhogam was not given.  TDAP was not given.    A/P:  Encounter Diagnosis   Name Primary?     Normal pregnancy in first trimester      Orders Placed This Encounter   Procedures     Glucose tolerance gest screen 1 hour     Vitamin D Deficiency     CBC with platelets differential     Treponema Abs w Reflex to RPR and Titer     Orders Placed This Encounter   Medications     Prenatal Vit-Fe Fumarate-FA (PRENATAL MULTIVITAMIN W/IRON) 27-0.8 MG tablet     Sig: Take 1 tablet by mouth daily     Dispense:  90 tablet     Refill:  3     Discussed on-call schedule with CNMs and options for seeing multiple providers vs. 2-3 for continuity. As patient desires to know provider at time of birth, recommended seeing multiple CNMs through  remainder of pregnancy.   Discussed potential causes of dizziness including physiologic changes in pregnancy, dehydration or hypoglycemia. Encouraged increased PO hydration and regular snacks. If symptoms persist or worsen, to return/call clinic.   Offer Tdap at next visit  Continue scheduled prenatal care, RTC in 4 weeks    EDY Moran CNM

## 2021-05-27 NOTE — LETTER
2021       RE: Xiao Maguire  [address]  [address]  [place] [address]     Dear Colleague,    Thank you for referring your patient, Xiao Maguire, to the [facility] [place] at [facility]. Please see a copy of my visit note below.    Subjective:  26 year old, , 25w3d, presents for prenatal visit.   + fetal movement. Denies cramping/contractions. Denies leaking of fluid or vaginal bleeding.   The patient presents with the following concerns: feeling overall well   - Reports occasional dizziness when standing, resolves on own. Denies LOC.   - Questions re: CNM practice and staffing in hospital.  Is important to patient to know provider at labor/birth    No flowsheet data found.  Education completed today includes breast feeding, [facility] hand out , contraception, counting movements, signs of pre-term labor, when to present to birthplace, post partum depression, GBS, getting enough iron and labor induction.  Birth preferences reviewed: flexible, reviewed all options  Labor support:  Mother or brother, partner is out of country  [place] Feeding plans: Breastfeeding  Contraception planned: none  The following labs were ordered today: GCT, CBC w platelets, Vitamin D and Anti-treponema  Water birth consent form was not given.    Blood type:   ABO   Date Value Ref Range Status   02/10/2021 O  Final     RH(D)   Date Value Ref Range Status   02/10/2021 Pos  Final     Antibody Screen   Date Value Ref Range Status   02/10/2021 Neg  Final   Rhogam was not given.  TDAP was not given.    A/P:  Encounter Diagnosis   Name Primary?     Normal pregnancy in first trimester      Orders Placed This Encounter   Procedures     Glucose tolerance gest screen 1 hour     Vitamin D Deficiency     CBC with platelets differential     Treponema Abs w Reflex to RPR and Titer     Orders Placed This Encounter   Medications     Prenatal Vit-Fe Fumarate-FA  (PRENATAL MULTIVITAMIN W/IRON) 27-0.8 MG tablet     Sig: Take 1 tablet by mouth daily     Dispense:  90 tablet     Refill:  3     Discussed on-call schedule with CNMs and options for seeing multiple providers vs. 2-3 for continuity. As patient desires to know provider at time of birth, recommended seeing multiple CNMs through remainder of pregnancy.   Discussed potential causes of dizziness including physiologic changes in pregnancy, dehydration or hypoglycemia. Encouraged increased PO hydration and regular snacks. If symptoms persist or worsen, to return/call clinic.   Offer Tdap at next visit  Continue scheduled prenatal care, RTC in 4 weeks    EDY Moran CNM

## 2021-05-28 ENCOUNTER — TELEPHONE (OUTPATIENT)
Dept: OBGYN | Facility: CLINIC | Age: 26
End: 2021-05-28

## 2021-05-28 DIAGNOSIS — D64.9 ANEMIA: Primary | ICD-10-CM

## 2021-05-28 LAB
DEPRECATED CALCIDIOL+CALCIFEROL SERPL-MC: 17 UG/L (ref 20–75)
T PALLIDUM AB SER QL: NONREACTIVE

## 2021-05-28 RX ORDER — FERROUS SULFATE 325(65) MG
325 TABLET ORAL
Qty: 180 TABLET | Refills: 1 | Status: ON HOLD | OUTPATIENT
Start: 2021-05-28 | End: 2021-09-05

## 2021-05-28 RX ORDER — RIBOFLAVIN (VITAMIN B2) 100 MG
TABLET ORAL
Qty: 90 TABLET | Refills: 1 | Status: ON HOLD | OUTPATIENT
Start: 2021-05-28 | End: 2021-09-05

## 2021-05-28 NOTE — RESULT ENCOUNTER NOTE
Called patient with [facility] , left  to call back to discuss lab results.

## 2021-06-23 NOTE — PROGRESS NOTES
"Subjective:      26 year old  at 29w3d presents for a routine prenatal appointment.     Denies cramping/contractions, vaginal bleeding, discharge or leakage of fluid. Reports +fetal movement.  No HA, vision changes, ruq/epigastric pain.      Patient concerns: Feeling well overall. Present at appointment with her little sister.   Having trouble sleeping d/t discomfort. Notes some lower abdominal and pelvic discomfort. Points to pubic bone and round ligament. Has not tried a maternity belt.     Also still feeling occasionally dizzy. Started oral iron supplementation but is now interested in IV iron infusions.     Objective:  Vitals:    21 1532   BP: (!) 88/60   Pulse: 106   Weight: 63.6 kg (140 lb 4.8 oz)   Height: 1.626 m (5' 4.02\")     See ob flowsheet    Assessment/Plan     Encounter Diagnoses   Name Primary?     Normal pregnancy in third trimester      Round ligament pain      Iron deficiency anemia secondary to inadequate dietary iron intake Yes     Normal pregnancy in first trimester      Orders Placed This Encounter   Procedures     TDAP VACCINE (Adacel, Boostrix)  [[id number]]     Asymptomatic COVID-19 Virus (Coronavirus) by PCR     Neck/Shoulder/Back/Abdomen Order for DME - ONLY FOR DME     No orders of the defined types were placed in this encounter.    - Reviewed total weight gain, encouraged continued healthy diet and exercise.  .  Reviewed importance of daily fetal kick count and why/how to contact provider.    - Reviewed why/how to contact provider if headache/visual changes/RUQ or epigastric pain, decreased fetal movement, vaginal bleeding, leakage of fluid or more than 4 contractions in an hour.     Patient education/orders or handouts today:  PTL signs/symptoms    - Reviewed EOB labs.  - Continue oral supplementation.   - Therapy orders placed for IV iron infusion (injectafer).   COVID testing order placed.  Epic message sent to RN team to reach out to pt to assist with scheduling infusions. "   - Continue vitamin D supplementation.   - Tdap administered.    Continue scheduled prenatal care, RTC in 2 weeks and prn if questions or concerns.    Scheduled for 7/8/21.     EDY Jimenez, DENIZM

## 2021-06-24 ENCOUNTER — OFFICE VISIT (OUTPATIENT)
Dept: OBGYN | Facility: CLINIC | Age: 26
End: 2021-06-24
Attending: ADVANCED PRACTICE MIDWIFE
Payer: COMMERCIAL

## 2021-06-24 VITALS
DIASTOLIC BLOOD PRESSURE: 60 MMHG | BODY MASS INDEX: 23.95 KG/M2 | HEIGHT: 64 IN | SYSTOLIC BLOOD PRESSURE: 88 MMHG | HEART RATE: 106 BPM | WEIGHT: 140.3 LBS

## 2021-06-24 DIAGNOSIS — Z34.91 NORMAL PREGNANCY IN FIRST TRIMESTER: ICD-10-CM

## 2021-06-24 DIAGNOSIS — Z34.93 NORMAL PREGNANCY IN THIRD TRIMESTER: ICD-10-CM

## 2021-06-24 DIAGNOSIS — D50.8 IRON DEFICIENCY ANEMIA SECONDARY TO INADEQUATE DIETARY IRON INTAKE: Primary | ICD-10-CM

## 2021-06-24 DIAGNOSIS — N94.9 ROUND LIGAMENT PAIN: ICD-10-CM

## 2021-06-24 PROCEDURE — 90715 TDAP VACCINE 7 YRS/> IM: CPT

## 2021-06-24 PROCEDURE — 250N000011 HC RX IP 250 OP 636

## 2021-06-24 PROCEDURE — 99207 PR PRENATAL VISIT: CPT | Performed by: ADVANCED PRACTICE MIDWIFE

## 2021-06-24 PROCEDURE — G0463 HOSPITAL OUTPT CLINIC VISIT: HCPCS | Mod: 25

## 2021-06-24 PROCEDURE — 90471 IMMUNIZATION ADMIN: CPT

## 2021-06-24 RX ORDER — HEPARIN SODIUM,PORCINE 10 UNIT/ML
5 VIAL (ML) INTRAVENOUS
Status: CANCELLED | OUTPATIENT
Start: 2021-06-28

## 2021-06-24 RX ORDER — EPINEPHRINE 1 MG/ML
0.3 INJECTION, SOLUTION, CONCENTRATE INTRAVENOUS EVERY 5 MIN PRN
Status: CANCELLED | OUTPATIENT
Start: 2021-06-28

## 2021-06-24 RX ORDER — ALBUTEROL SULFATE 5 MG/ML
2.5 SOLUTION RESPIRATORY (INHALATION)
Status: CANCELLED | OUTPATIENT
Start: 2021-06-28

## 2021-06-24 RX ORDER — METHYLPREDNISOLONE SODIUM SUCCINATE 125 MG/2ML
125 INJECTION, POWDER, LYOPHILIZED, FOR SOLUTION INTRAMUSCULAR; INTRAVENOUS
Status: CANCELLED
Start: 2021-06-28

## 2021-06-24 RX ORDER — NALOXONE HYDROCHLORIDE 0.4 MG/ML
0.2 INJECTION, SOLUTION INTRAMUSCULAR; INTRAVENOUS; SUBCUTANEOUS
Status: CANCELLED | OUTPATIENT
Start: 2021-06-28

## 2021-06-24 RX ORDER — ALBUTEROL SULFATE 90 UG/1
1-2 AEROSOL, METERED RESPIRATORY (INHALATION)
Status: CANCELLED
Start: 2021-06-28

## 2021-06-24 RX ORDER — HEPARIN SODIUM (PORCINE) LOCK FLUSH IV SOLN 100 UNIT/ML 100 UNIT/ML
5 SOLUTION INTRAVENOUS
Status: CANCELLED | OUTPATIENT
Start: 2021-06-28

## 2021-06-24 RX ORDER — MEPERIDINE HYDROCHLORIDE 25 MG/ML
25 INJECTION INTRAMUSCULAR; INTRAVENOUS; SUBCUTANEOUS EVERY 30 MIN PRN
Status: CANCELLED | OUTPATIENT
Start: 2021-06-28

## 2021-06-24 RX ORDER — DIPHENHYDRAMINE HYDROCHLORIDE 50 MG/ML
50 INJECTION INTRAMUSCULAR; INTRAVENOUS
Status: CANCELLED
Start: 2021-06-28

## 2021-06-24 ASSESSMENT — PAIN SCALES - GENERAL: PAINLEVEL: NO PAIN (0)

## 2021-06-24 ASSESSMENT — MIFFLIN-ST. JEOR: SCORE: 1351.65

## 2021-06-24 NOTE — LETTER
"2021       RE: Xiao Maguire  [address]  [address]  [place] [address]     Dear Colleague,    Thank you for referring your patient, Xiao Maguire, to the [facility] [place] at [facility]. Please see a copy of my visit note below.    Subjective:      26 year old  at 29w3d presents for a routine prenatal appointment.     Denies cramping/contractions, vaginal bleeding, discharge or leakage of fluid. Reports +fetal movement.  No HA, vision changes, ruq/epigastric pain.      Patient concerns: Feeling well overall. Present at appointment with her little sister.   Having trouble sleeping d/t discomfort. Notes some lower abdominal and pelvic discomfort. Points to pubic bone and round ligament. Has not tried a maternity belt.     Also still feeling occasionally dizzy. Started oral iron supplementation but is now interested in IV iron infusions.     Objective:  Vitals:    21 1532   BP: (!) 88/60   Pulse: 106   Weight: 63.6 kg (140 lb 4.8 oz)   Height: 1.626 m (5' 4.02\")     See ob flowsheet    Assessment/Plan     Encounter Diagnoses   Name Primary?     Normal pregnancy in third trimester      Round ligament pain      Iron deficiency anemia secondary to inadequate dietary iron intake Yes     Normal pregnancy in first trimester      Orders Placed This Encounter   Procedures     TDAP VACCINE (Adacel, Boostrix)  [[id number]]     Asymptomatic COVID-19 Virus (Coronavirus) by PCR     Neck/Shoulder/Back/Abdomen Order for DME - ONLY FOR DME     No orders of the defined types were placed in this encounter.    - Reviewed total weight gain, encouraged continued healthy diet and exercise.  .  Reviewed importance of daily fetal kick count and why/how to contact provider.    - Reviewed why/how to contact provider if headache/visual changes/RUQ or epigastric pain, decreased fetal movement, vaginal bleeding, leakage of fluid or more than 4 " contractions in an hour.     Patient education/orders or handouts today:  PTL signs/symptoms    - Reviewed EOB labs.  - Continue oral supplementation.   - Therapy orders placed for IV iron infusion (injectafer).   COVID testing order placed.  Epic message sent to RN team to reach out to pt to assist with scheduling infusions.   - Continue vitamin D supplementation.   - Tdap administered.    Continue scheduled prenatal care, RTC in 2 weeks and prn if questions or concerns.    Scheduled for 7/8/21.     EDY Jimenez, CNM

## 2021-07-12 ENCOUNTER — INFUSION THERAPY VISIT (OUTPATIENT)
Dept: INFUSION THERAPY | Facility: CLINIC | Age: 26
End: 2021-07-12
Attending: ADVANCED PRACTICE MIDWIFE
Payer: COMMERCIAL

## 2021-07-12 VITALS
HEART RATE: 80 BPM | RESPIRATION RATE: 16 BRPM | DIASTOLIC BLOOD PRESSURE: 68 MMHG | TEMPERATURE: 98.2 F | OXYGEN SATURATION: 99 % | SYSTOLIC BLOOD PRESSURE: 89 MMHG

## 2021-07-12 DIAGNOSIS — D50.8 IRON DEFICIENCY ANEMIA SECONDARY TO INADEQUATE DIETARY IRON INTAKE: Primary | ICD-10-CM

## 2021-07-12 DIAGNOSIS — Z34.91 NORMAL PREGNANCY IN FIRST TRIMESTER: ICD-10-CM

## 2021-07-12 PROCEDURE — 258N000003 HC RX IP 258 OP 636: Performed by: ADVANCED PRACTICE MIDWIFE

## 2021-07-12 PROCEDURE — 250N000011 HC RX IP 250 OP 636: Performed by: ADVANCED PRACTICE MIDWIFE

## 2021-07-12 PROCEDURE — 96365 THER/PROPH/DIAG IV INF INIT: CPT

## 2021-07-12 RX ORDER — ALBUTEROL SULFATE 90 UG/1
1-2 AEROSOL, METERED RESPIRATORY (INHALATION)
Status: CANCELLED
Start: 2021-07-19

## 2021-07-12 RX ORDER — METHYLPREDNISOLONE SODIUM SUCCINATE 125 MG/2ML
125 INJECTION, POWDER, LYOPHILIZED, FOR SOLUTION INTRAMUSCULAR; INTRAVENOUS
Status: CANCELLED
Start: 2021-07-19

## 2021-07-12 RX ORDER — HEPARIN SODIUM,PORCINE 10 UNIT/ML
5 VIAL (ML) INTRAVENOUS
Status: CANCELLED | OUTPATIENT
Start: 2021-07-19

## 2021-07-12 RX ORDER — MEPERIDINE HYDROCHLORIDE 25 MG/ML
25 INJECTION INTRAMUSCULAR; INTRAVENOUS; SUBCUTANEOUS EVERY 30 MIN PRN
Status: CANCELLED | OUTPATIENT
Start: 2021-07-19

## 2021-07-12 RX ORDER — DIPHENHYDRAMINE HYDROCHLORIDE 50 MG/ML
50 INJECTION INTRAMUSCULAR; INTRAVENOUS
Status: CANCELLED
Start: 2021-07-19

## 2021-07-12 RX ORDER — ALBUTEROL SULFATE 0.83 MG/ML
2.5 SOLUTION RESPIRATORY (INHALATION)
Status: CANCELLED | OUTPATIENT
Start: 2021-07-19

## 2021-07-12 RX ORDER — NALOXONE HYDROCHLORIDE 0.4 MG/ML
0.2 INJECTION, SOLUTION INTRAMUSCULAR; INTRAVENOUS; SUBCUTANEOUS
Status: CANCELLED | OUTPATIENT
Start: 2021-07-19

## 2021-07-12 RX ORDER — EPINEPHRINE 1 MG/ML
0.3 INJECTION, SOLUTION, CONCENTRATE INTRAVENOUS EVERY 5 MIN PRN
Status: CANCELLED | OUTPATIENT
Start: 2021-07-19

## 2021-07-12 RX ORDER — HEPARIN SODIUM (PORCINE) LOCK FLUSH IV SOLN 100 UNIT/ML 100 UNIT/ML
5 SOLUTION INTRAVENOUS
Status: CANCELLED | OUTPATIENT
Start: 2021-07-19

## 2021-07-12 RX ADMIN — FERRIC CARBOXYMALTOSE INJECTION 750 MG: 50 INJECTION, SOLUTION INTRAVENOUS at 14:12

## 2021-07-12 ASSESSMENT — PAIN SCALES - GENERAL: PAINLEVEL: NO PAIN (0)

## 2021-07-12 NOTE — PROGRESS NOTES
Infusion Nursing Note:  Xiao Maguire presents today for Injectafer.    Patient seen by provider today: No   present during visit today: Not Applicable.    Note: Injectafer infused over 15 mins with 30 min obs.      Intravenous Access:  Peripheral IV placed.    Treatment Conditions:  Not Applicable.      Post Infusion Assessment:  Patient tolerated infusion without incident.  Patient observed for 30 minutes post Injectafer per protocol.  Site patent and intact, free from redness, edema or discomfort.  No evidence of extravasations.  Access discontinued per protocol.       Discharge Plan:   Patient discharged in stable condition accompanied by: self.  Departure Mode: Ambulatory.  Next appt 7/19    Amelia Flynn RN

## 2021-07-19 ENCOUNTER — INFUSION THERAPY VISIT (OUTPATIENT)
Dept: INFUSION THERAPY | Facility: CLINIC | Age: 26
End: 2021-07-19
Attending: ADVANCED PRACTICE MIDWIFE
Payer: COMMERCIAL

## 2021-07-19 VITALS
TEMPERATURE: 98.1 F | RESPIRATION RATE: 16 BRPM | DIASTOLIC BLOOD PRESSURE: 40 MMHG | OXYGEN SATURATION: 98 % | SYSTOLIC BLOOD PRESSURE: 111 MMHG | HEART RATE: 84 BPM

## 2021-07-19 DIAGNOSIS — D50.8 IRON DEFICIENCY ANEMIA SECONDARY TO INADEQUATE DIETARY IRON INTAKE: Primary | ICD-10-CM

## 2021-07-19 DIAGNOSIS — Z34.91 NORMAL PREGNANCY IN FIRST TRIMESTER: ICD-10-CM

## 2021-07-19 PROCEDURE — 250N000011 HC RX IP 250 OP 636: Performed by: ADVANCED PRACTICE MIDWIFE

## 2021-07-19 PROCEDURE — 96365 THER/PROPH/DIAG IV INF INIT: CPT

## 2021-07-19 PROCEDURE — 258N000003 HC RX IP 258 OP 636: Performed by: ADVANCED PRACTICE MIDWIFE

## 2021-07-19 RX ORDER — HEPARIN SODIUM,PORCINE 10 UNIT/ML
5 VIAL (ML) INTRAVENOUS
Status: CANCELLED | OUTPATIENT
Start: 2021-07-19

## 2021-07-19 RX ORDER — HEPARIN SODIUM (PORCINE) LOCK FLUSH IV SOLN 100 UNIT/ML 100 UNIT/ML
5 SOLUTION INTRAVENOUS
Status: CANCELLED | OUTPATIENT
Start: 2021-07-19

## 2021-07-19 RX ORDER — ALBUTEROL SULFATE 90 UG/1
1-2 AEROSOL, METERED RESPIRATORY (INHALATION)
Status: CANCELLED
Start: 2021-07-19

## 2021-07-19 RX ORDER — MEPERIDINE HYDROCHLORIDE 25 MG/ML
25 INJECTION INTRAMUSCULAR; INTRAVENOUS; SUBCUTANEOUS EVERY 30 MIN PRN
Status: CANCELLED | OUTPATIENT
Start: 2021-07-19

## 2021-07-19 RX ORDER — METHYLPREDNISOLONE SODIUM SUCCINATE 125 MG/2ML
125 INJECTION, POWDER, LYOPHILIZED, FOR SOLUTION INTRAMUSCULAR; INTRAVENOUS
Status: CANCELLED
Start: 2021-07-19

## 2021-07-19 RX ORDER — DIPHENHYDRAMINE HYDROCHLORIDE 50 MG/ML
50 INJECTION INTRAMUSCULAR; INTRAVENOUS
Status: CANCELLED
Start: 2021-07-19

## 2021-07-19 RX ORDER — ALBUTEROL SULFATE 0.83 MG/ML
2.5 SOLUTION RESPIRATORY (INHALATION)
Status: CANCELLED | OUTPATIENT
Start: 2021-07-19

## 2021-07-19 RX ORDER — NALOXONE HYDROCHLORIDE 0.4 MG/ML
0.2 INJECTION, SOLUTION INTRAMUSCULAR; INTRAVENOUS; SUBCUTANEOUS
Status: CANCELLED | OUTPATIENT
Start: 2021-07-19

## 2021-07-19 RX ORDER — EPINEPHRINE 1 MG/ML
0.3 INJECTION, SOLUTION, CONCENTRATE INTRAVENOUS EVERY 5 MIN PRN
Status: CANCELLED | OUTPATIENT
Start: 2021-07-19

## 2021-07-19 RX ADMIN — FERRIC CARBOXYMALTOSE INJECTION 750 MG: 50 INJECTION, SOLUTION INTRAVENOUS at 14:29

## 2021-07-19 ASSESSMENT — PAIN SCALES - GENERAL: PAINLEVEL: NO PAIN (0)

## 2021-07-19 NOTE — PROGRESS NOTES
Infusion Nursing Note:  Xiao Maguire presents today for 2nd dose of injectafer.    Patient seen by provider today: No   present during visit today: Not Applicable.    Note: patient states she has more energy.      Intravenous Access:  Peripheral IV placed.    Treatment Conditions:  Not Applicable.      Post Infusion Assessment:  Patient tolerated infusion without incident.  Patient observed for 30 minutes post injectafer per protocol.  Blood return noted pre and post infusion.  Site patent and intact, free from redness, edema or discomfort.  No evidence of extravasations.  Access discontinued per protocol.       Discharge Plan:   Patient discharged in stable condition accompanied by: self.  Departure Mode: Ambulatory.  Therapy complete, no further appointments.    Stacia Dorsey RN

## 2021-08-04 ENCOUNTER — OFFICE VISIT (OUTPATIENT)
Dept: OBGYN | Facility: CLINIC | Age: 26
End: 2021-08-04
Attending: ADVANCED PRACTICE MIDWIFE
Payer: COMMERCIAL

## 2021-08-04 VITALS
SYSTOLIC BLOOD PRESSURE: 102 MMHG | HEART RATE: 89 BPM | BODY MASS INDEX: 24.77 KG/M2 | DIASTOLIC BLOOD PRESSURE: 67 MMHG | WEIGHT: 144.4 LBS

## 2021-08-04 DIAGNOSIS — D50.8 IRON DEFICIENCY ANEMIA SECONDARY TO INADEQUATE DIETARY IRON INTAKE: ICD-10-CM

## 2021-08-04 DIAGNOSIS — Z34.93 NORMAL PREGNANCY IN THIRD TRIMESTER: Primary | ICD-10-CM

## 2021-08-04 PROCEDURE — G0463 HOSPITAL OUTPT CLINIC VISIT: HCPCS

## 2021-08-04 PROCEDURE — 99207 PR PRENATAL VISIT: CPT | Performed by: ADVANCED PRACTICE MIDWIFE

## 2021-08-04 ASSESSMENT — PAIN SCALES - GENERAL: PAINLEVEL: NO PAIN (0)

## 2021-08-04 NOTE — PROGRESS NOTES
Subjective:     26 year old  at 35w2d presents for routine prenatal visit.            Denies vaginal bleeding or leakage of fluid.  Report some irregular contractions.  Reports regular fetal movement.        No HA, visual changes, RUQ or epigastric pain.   Patient concerns: Denies concerns today.  Feeling well overall.  Patient has completed IV iron.  Objective:  Vitals:    21 1507   BP: 102/67   Pulse: 89   Weight: 65.5 kg (144 lb 6.4 oz)    See OB flowsheet  Assessment/Plan     Encounter Diagnosis   Name Primary?     Normal pregnancy in third trimester Yes         Plan GBS at next visit.  Return to clinic in 1 week and prn if questions or concerns.   EDY Gonzalez CNM

## 2021-08-04 NOTE — LETTER
2021       RE: Xiao Maguire  [address]  [address]  [place] [address]     Dear Colleague,    Thank you for referring your patient, Xiao Maguire, to the [facility] [place] at [facility]. Please see a copy of my visit note below.    Subjective:     26 year old  at 35w2d presents for routine prenatal visit.            Denies vaginal bleeding or leakage of fluid.  Report some irregular contractions.  Reports regular fetal movement.        No HA, visual changes, RUQ or epigastric pain.   Patient concerns: Denies concerns today.  Feeling well overall.  Patient has completed IV iron.  Objective:  Vitals:    21 1507   BP: 102/67   Pulse: 89   Weight: 65.5 kg (144 lb 6.4 oz)    See OB flowsheet  Assessment/Plan     Encounter Diagnosis   Name Primary?     Normal pregnancy in third trimester Yes         Plan GBS at next visit.  Return to clinic in 1 week and prn if questions or concerns.   EDY Gonzalez CNM

## 2021-08-16 ENCOUNTER — ANCILLARY PROCEDURE (OUTPATIENT)
Dept: ULTRASOUND IMAGING | Facility: CLINIC | Age: 26
End: 2021-08-16
Attending: ADVANCED PRACTICE MIDWIFE
Payer: COMMERCIAL

## 2021-08-16 ENCOUNTER — OFFICE VISIT (OUTPATIENT)
Dept: OBGYN | Facility: CLINIC | Age: 26
End: 2021-08-16
Attending: ADVANCED PRACTICE MIDWIFE
Payer: COMMERCIAL

## 2021-08-16 ENCOUNTER — LAB (OUTPATIENT)
Dept: LAB | Facility: CLINIC | Age: 26
End: 2021-08-16
Attending: ADVANCED PRACTICE MIDWIFE
Payer: COMMERCIAL

## 2021-08-16 VITALS
WEIGHT: 147.4 LBS | HEIGHT: 64 IN | DIASTOLIC BLOOD PRESSURE: 66 MMHG | BODY MASS INDEX: 25.16 KG/M2 | SYSTOLIC BLOOD PRESSURE: 97 MMHG | HEART RATE: 92 BPM

## 2021-08-16 DIAGNOSIS — Z34.93 NORMAL PREGNANCY IN THIRD TRIMESTER: ICD-10-CM

## 2021-08-16 DIAGNOSIS — Z34.93 NORMAL PREGNANCY IN THIRD TRIMESTER: Primary | ICD-10-CM

## 2021-08-16 PROBLEM — D69.6 THROMBOCYTOPENIA (H): Status: ACTIVE | Noted: 2021-08-16

## 2021-08-16 LAB
ERYTHROCYTE [DISTWIDTH] IN BLOOD BY AUTOMATED COUNT: 21.3 % (ref 10–15)
HCT VFR BLD AUTO: 36.5 % (ref 35–47)
HGB BLD-MCNC: 11.7 G/DL (ref 11.7–15.7)
MCH RBC QN AUTO: 28.8 PG (ref 26.5–33)
MCHC RBC AUTO-ENTMCNC: 32.1 G/DL (ref 31.5–36.5)
MCV RBC AUTO: 90 FL (ref 78–100)
PLATELET # BLD AUTO: 103 10E3/UL (ref 150–450)
RBC # BLD AUTO: 4.06 10E6/UL (ref 3.8–5.2)
WBC # BLD AUTO: 6.9 10E3/UL (ref 4–11)

## 2021-08-16 PROCEDURE — 36415 COLL VENOUS BLD VENIPUNCTURE: CPT

## 2021-08-16 PROCEDURE — 87653 STREP B DNA AMP PROBE: CPT | Performed by: ADVANCED PRACTICE MIDWIFE

## 2021-08-16 PROCEDURE — 59426 ANTEPARTUM CARE ONLY: CPT | Performed by: ADVANCED PRACTICE MIDWIFE

## 2021-08-16 PROCEDURE — G0463 HOSPITAL OUTPT CLINIC VISIT: HCPCS

## 2021-08-16 PROCEDURE — 59025 FETAL NON-STRESS TEST: CPT | Mod: 26 | Performed by: ADVANCED PRACTICE MIDWIFE

## 2021-08-16 PROCEDURE — 85027 COMPLETE CBC AUTOMATED: CPT

## 2021-08-16 PROCEDURE — 76815 OB US LIMITED FETUS(S): CPT | Mod: 26 | Performed by: OBSTETRICS & GYNECOLOGY

## 2021-08-16 PROCEDURE — 76815 OB US LIMITED FETUS(S): CPT

## 2021-08-16 RX ORDER — ACETAMINOPHEN 500 MG
500-1000 TABLET ORAL EVERY 6 HOURS PRN
Qty: 90 TABLET | Refills: 3 | Status: ON HOLD | OUTPATIENT
Start: 2021-08-16 | End: 2021-09-05

## 2021-08-16 RX ORDER — SENNA AND DOCUSATE SODIUM 50; 8.6 MG/1; MG/1
1 TABLET, FILM COATED ORAL AT BEDTIME
Qty: 90 TABLET | Refills: 3 | Status: ON HOLD | OUTPATIENT
Start: 2021-08-16 | End: 2021-09-05

## 2021-08-16 RX ORDER — IBUPROFEN 600 MG/1
600 TABLET, FILM COATED ORAL EVERY 6 HOURS PRN
Qty: 90 TABLET | Refills: 3 | Status: ON HOLD | OUTPATIENT
Start: 2021-08-16 | End: 2021-09-05

## 2021-08-16 ASSESSMENT — MIFFLIN-ST. JEOR: SCORE: 1383.92

## 2021-08-16 NOTE — PROGRESS NOTES
"Subjective:      26 year old  at 37w0d presents for a routine prenatal appointment.         Denies vaginal bleeding,  leakage of fluid, or change in vaginal discharge.  Denies contractions.  Reports regular fetal movement.     No HA, visual changes, RUQ or epigastric pain.   Patient concerns: Low back pain started one week ago.  Has not tried any self cares.  Rx sent for Tylenol and encouraged heat/ice as needed.  Consents to GBS screening today.  Patient will RTC at 1300 for US position check.     Objective:  Vitals:    21 1106   BP: 97/66   Pulse: 92   Weight: 66.9 kg (147 lb 6.4 oz)   Height: 1.626 m (5' 4.02\")    See OB flowsheet    Assessment/Plan     Encounter Diagnosis   Name Primary?     Normal pregnancy in third trimester Yes     Orders Placed This Encounter   Procedures     CBC with Platelets         GBS screening: Obtained.  Plan CBC today.  US for position check at 1300.  Reviewed recommended postpartum OTC medication. Pt desires prescription. Tylenol, motrin and senna-docusate to have at home. Reviewed no use of motrin during pregnancy.   Birth preferences reviewed: keeping an open mind.  Labor signs discussed. Reinforced daily fetal movement counts.  Reviewed why/how to contact provider if headache/visual changes/RUQ or epigastric pain, decreased fetal movement, vaginal bleeding, leakage of fluid.   Return to clinic in 1 week and prn if questions or concerns.     EDY Gonzalez VARINDER    Addendum @ 1330  Patient returned to clinic for an ultrasound to assess fetal presentation at 1322. Found to be breech via transabdominal ultrasound and a lower HR was noted by ultrasound tech. Patient started an NST around 1330:    NST IS:  Reactive (2 accl > 15 BPM in 20 min., each lasting approx. 15 seconds)  NST Baseline Rate 115-120, Variability: moderate  Accelerations:Present  Variable Decelerations:No  Other Decelerations:No  Acoustical Stimulation Utilized:NO  Other: uterine irritability " traced, patient denied cramping or contractions  NST-REACTIVE, reviewed by ELLYN Ahmadi and writer ELLYN Ramirez.     -Patient counseled on risks and benefits of ECV vs. Scheduled  section vs. Expectant management. Patient desires expectant management at this time. She states she would like to go home and discuss with family before making any decisions about ECV.   -Provided information about spinning babies and positioning to encourage fetal rotation.   -Discussed possibility of spontaneous labor at any point due to term gestation and the inability to perform an ECV if she were to present to labor and delivery in labor or s/p SROM. Patient states understanding and is agreeable to return to clinic 21 for a repeat ultrasound.   -Encouraged to call the clinic if she has any further questions.     EDY Root CNM

## 2021-08-16 NOTE — LETTER
"2021       RE: Xiao Maguire  [address]  [address]  [place] [address]     Dear Colleague,    Thank you for referring your patient, iXao Maguire, to the [facility] [place] at [facility]. Please see a copy of my visit note below.    Subjective:      26 year old  at 37w0d presents for a routine prenatal appointment.         Denies vaginal bleeding,  leakage of fluid, or change in vaginal discharge.  Denies contractions.  Reports regular fetal movement.     No HA, visual changes, RUQ or epigastric pain.   Patient concerns: Low back pain started one week ago.  Has not tried any self cares.  Rx sent for Tylenol and encouraged heat/ice as needed.  Consents to GBS screening today.  Patient will RTC at 1300 for US position check.     Objective:  Vitals:    21 1106   BP: 97/66   Pulse: 92   Weight: 66.9 kg (147 lb 6.4 oz)   Height: 1.626 m (5' 4.02\")    See OB flowsheet    Assessment/Plan     Encounter Diagnosis   Name Primary?     Normal pregnancy in third trimester Yes     Orders Placed This Encounter   Procedures     CBC with Platelets         GBS screening: Obtained.  Plan CBC today.  US for position check at 1300.  Reviewed recommended postpartum OTC medication. Pt desires prescription. Tylenol, motrin and senna-docusate to have at home. Reviewed no use of motrin during pregnancy.   Birth preferences reviewed: keeping an open mind.  Labor signs discussed. Reinforced daily fetal movement counts.  Reviewed why/how to contact provider if headache/visual changes/RUQ or epigastric pain, decreased fetal movement, vaginal bleeding, leakage of fluid.   Return to clinic in 1 week and prn if questions or concerns.     EDY Gonzalez CNM    Addendum @ 7285  Patient returned to clinic for an ultrasound to assess fetal presentation at 1322. Found to be breech via transabdominal ultrasound and a lower HR was noted by " ultrasound tech. Patient started an NST around 1330:    NST IS:  Reactive (2 accl > 15 BPM in 20 min., each lasting approx. 15 seconds)  NST Baseline Rate 115-120, Variability: moderate  Accelerations:Present  Variable Decelerations:No  Other Decelerations:No  Acoustical Stimulation Utilized:NO  Other: uterine irritability traced, patient denied cramping or contractions  NST-REACTIVE, reviewed by ELLYN Ahmadi and writer ELLYN Ramirez.     -Patient counseled on risks and benefits of ECV vs. Scheduled  section vs. Expectant management. Patient desires expectant management at this time. She states she would like to go home and discuss with family before making any decisions about ECV.   -Provided information about spinning babies and positioning to encourage fetal rotation.   -Discussed possibility of spontaneous labor at any point due to term gestation and the inability to perform an ECV if she were to present to labor and delivery in labor or s/p SROM. Patient states understanding and is agreeable to return to clinic 21 for a repeat ultrasound.   -Encouraged to call the clinic if she has any further questions.     EDY Root CNM

## 2021-08-17 LAB
GP B STREP DNA SPEC QL NAA+PROBE: NEGATIVE
PATIENT PENICILLIN, AMOXICILLIN, CEPHALOSPORINS ALLERGY: NO

## 2021-08-20 ENCOUNTER — ANCILLARY PROCEDURE (OUTPATIENT)
Dept: ULTRASOUND IMAGING | Facility: CLINIC | Age: 26
End: 2021-08-20
Attending: ADVANCED PRACTICE MIDWIFE
Payer: COMMERCIAL

## 2021-08-20 PROCEDURE — 76815 OB US LIMITED FETUS(S): CPT

## 2021-08-20 PROCEDURE — 76815 OB US LIMITED FETUS(S): CPT | Mod: 26 | Performed by: OBSTETRICS & GYNECOLOGY

## 2021-09-04 ENCOUNTER — ANESTHESIA EVENT (OUTPATIENT)
Dept: OBGYN | Facility: CLINIC | Age: 26
End: 2021-09-04
Payer: COMMERCIAL

## 2021-09-04 ENCOUNTER — HOSPITAL ENCOUNTER (INPATIENT)
Facility: CLINIC | Age: 26
LOS: 2 days | Discharge: HOME HEALTH | End: 2021-09-06
Attending: MIDWIFE | Admitting: MIDWIFE
Payer: COMMERCIAL

## 2021-09-04 ENCOUNTER — ANESTHESIA (OUTPATIENT)
Dept: OBGYN | Facility: CLINIC | Age: 26
End: 2021-09-04
Payer: COMMERCIAL

## 2021-09-04 PROBLEM — O21.9 NAUSEA AND VOMITING IN PREGNANCY: Status: RESOLVED | Noted: 2021-02-16 | Resolved: 2021-09-04

## 2021-09-04 PROBLEM — Z36.89 ENCOUNTER FOR TRIAGE IN PREGNANT PATIENT: Status: ACTIVE | Noted: 2021-09-04

## 2021-09-04 PROBLEM — Z36.89 ENCOUNTER FOR TRIAGE IN PREGNANT PATIENT: Status: RESOLVED | Noted: 2021-09-04 | Resolved: 2021-09-04

## 2021-09-04 LAB
ABO/RH(D): NORMAL
ANTIBODY SCREEN: NEGATIVE
APTT PPP: 32 SECONDS (ref 22–38)
BASOPHILS # BLD AUTO: 0 10E3/UL (ref 0–0.2)
BASOPHILS NFR BLD AUTO: 0 %
EOSINOPHIL # BLD AUTO: 0.1 10E3/UL (ref 0–0.7)
EOSINOPHIL NFR BLD AUTO: 1 %
ERYTHROCYTE [DISTWIDTH] IN BLOOD BY AUTOMATED COUNT: 18.5 % (ref 10–15)
ERYTHROCYTE [DISTWIDTH] IN BLOOD BY AUTOMATED COUNT: 18.9 % (ref 10–15)
FIBRINOGEN PPP-MCNC: 505 MG/DL (ref 170–490)
HCT VFR BLD AUTO: 34.9 % (ref 35–47)
HCT VFR BLD AUTO: 35.8 % (ref 35–47)
HGB BLD-MCNC: 11.5 G/DL (ref 11.7–15.7)
HGB BLD-MCNC: 11.9 G/DL (ref 11.7–15.7)
HOLD SPECIMEN: NORMAL
IMM GRANULOCYTES # BLD: 0 10E3/UL
IMM GRANULOCYTES NFR BLD: 0 %
INR PPP: 1.03 (ref 0.85–1.15)
LYMPHOCYTES # BLD AUTO: 2.6 10E3/UL (ref 0.8–5.3)
LYMPHOCYTES NFR BLD AUTO: 28 %
MCH RBC QN AUTO: 29.4 PG (ref 26.5–33)
MCH RBC QN AUTO: 29.6 PG (ref 26.5–33)
MCHC RBC AUTO-ENTMCNC: 33 G/DL (ref 31.5–36.5)
MCHC RBC AUTO-ENTMCNC: 33.2 G/DL (ref 31.5–36.5)
MCV RBC AUTO: 88 FL (ref 78–100)
MCV RBC AUTO: 90 FL (ref 78–100)
MONOCYTES # BLD AUTO: 0.5 10E3/UL (ref 0–1.3)
MONOCYTES NFR BLD AUTO: 6 %
NEUTROPHILS # BLD AUTO: 6.1 10E3/UL (ref 1.6–8.3)
NEUTROPHILS NFR BLD AUTO: 65 %
NRBC # BLD AUTO: 0 10E3/UL
NRBC BLD AUTO-RTO: 0 /100
PLATELET # BLD AUTO: 110 10E3/UL (ref 150–450)
PLATELET # BLD AUTO: 98 10E3/UL (ref 150–450)
RBC # BLD AUTO: 3.89 10E6/UL (ref 3.8–5.2)
RBC # BLD AUTO: 4.05 10E6/UL (ref 3.8–5.2)
SARS-COV-2 RNA RESP QL NAA+PROBE: NEGATIVE
SPECIMEN EXPIRATION DATE: NORMAL
T PALLIDUM AB SER QL: NONREACTIVE
WBC # BLD AUTO: 9.3 10E3/UL (ref 4–11)
WBC # BLD AUTO: 9.5 10E3/UL (ref 4–11)

## 2021-09-04 PROCEDURE — 59410 OBSTETRICAL CARE: CPT | Performed by: MIDWIFE

## 2021-09-04 PROCEDURE — 370N000003 HC ANESTHESIA WARD SERVICE

## 2021-09-04 PROCEDURE — 86780 TREPONEMA PALLIDUM: CPT | Performed by: MIDWIFE

## 2021-09-04 PROCEDURE — G0463 HOSPITAL OUTPT CLINIC VISIT: HCPCS

## 2021-09-04 PROCEDURE — 85025 COMPLETE CBC W/AUTO DIFF WBC: CPT | Performed by: MIDWIFE

## 2021-09-04 PROCEDURE — 10907ZC DRAINAGE OF AMNIOTIC FLUID, THERAPEUTIC FROM PRODUCTS OF CONCEPTION, VIA NATURAL OR ARTIFICIAL OPENING: ICD-10-PCS | Performed by: MIDWIFE

## 2021-09-04 PROCEDURE — 258N000003 HC RX IP 258 OP 636

## 2021-09-04 PROCEDURE — 250N000011 HC RX IP 250 OP 636: Performed by: ANESTHESIOLOGY

## 2021-09-04 PROCEDURE — 120N000002 HC R&B MED SURG/OB UMMC

## 2021-09-04 PROCEDURE — 36415 COLL VENOUS BLD VENIPUNCTURE: CPT | Performed by: ANESTHESIOLOGY

## 2021-09-04 PROCEDURE — U0003 INFECTIOUS AGENT DETECTION BY NUCLEIC ACID (DNA OR RNA); SEVERE ACUTE RESPIRATORY SYNDROME CORONAVIRUS 2 (SARS-COV-2) (CORONAVIRUS DISEASE [COVID-19]), AMPLIFIED PROBE TECHNIQUE, MAKING USE OF HIGH THROUGHPUT TECHNOLOGIES AS DESCRIBED BY CMS-2020-01-R: HCPCS | Performed by: MIDWIFE

## 2021-09-04 PROCEDURE — 258N000003 HC RX IP 258 OP 636: Performed by: MIDWIFE

## 2021-09-04 PROCEDURE — 85610 PROTHROMBIN TIME: CPT | Performed by: ANESTHESIOLOGY

## 2021-09-04 PROCEDURE — 85730 THROMBOPLASTIN TIME PARTIAL: CPT | Performed by: ANESTHESIOLOGY

## 2021-09-04 PROCEDURE — 86901 BLOOD TYPING SEROLOGIC RH(D): CPT | Performed by: MIDWIFE

## 2021-09-04 PROCEDURE — 250N000011 HC RX IP 250 OP 636

## 2021-09-04 PROCEDURE — 250N000009 HC RX 250: Performed by: ANESTHESIOLOGY

## 2021-09-04 PROCEDURE — 36415 COLL VENOUS BLD VENIPUNCTURE: CPT | Performed by: MIDWIFE

## 2021-09-04 PROCEDURE — 85027 COMPLETE CBC AUTOMATED: CPT | Performed by: ANESTHESIOLOGY

## 2021-09-04 PROCEDURE — 85384 FIBRINOGEN ACTIVITY: CPT | Performed by: ANESTHESIOLOGY

## 2021-09-04 PROCEDURE — 722N000001 HC LABOR CARE VAGINAL DELIVERY SINGLE

## 2021-09-04 PROCEDURE — 250N000013 HC RX MED GY IP 250 OP 250 PS 637: Performed by: MIDWIFE

## 2021-09-04 PROCEDURE — 250N000009 HC RX 250

## 2021-09-04 PROCEDURE — 250N000011 HC RX IP 250 OP 636: Performed by: MIDWIFE

## 2021-09-04 PROCEDURE — 3E0R3BZ INTRODUCTION OF ANESTHETIC AGENT INTO SPINAL CANAL, PERCUTANEOUS APPROACH: ICD-10-PCS | Performed by: ANESTHESIOLOGY

## 2021-09-04 PROCEDURE — 00HU33Z INSERTION OF INFUSION DEVICE INTO SPINAL CANAL, PERCUTANEOUS APPROACH: ICD-10-PCS | Performed by: ANESTHESIOLOGY

## 2021-09-04 RX ORDER — MODIFIED LANOLIN
OINTMENT (GRAM) TOPICAL
Status: DISCONTINUED | OUTPATIENT
Start: 2021-09-04 | End: 2021-09-06 | Stop reason: HOSPADM

## 2021-09-04 RX ORDER — ONDANSETRON 4 MG/1
4 TABLET, ORALLY DISINTEGRATING ORAL EVERY 6 HOURS PRN
Status: DISCONTINUED | OUTPATIENT
Start: 2021-09-04 | End: 2021-09-04

## 2021-09-04 RX ORDER — SODIUM CHLORIDE, SODIUM LACTATE, POTASSIUM CHLORIDE, CALCIUM CHLORIDE 600; 310; 30; 20 MG/100ML; MG/100ML; MG/100ML; MG/100ML
INJECTION, SOLUTION INTRAVENOUS CONTINUOUS
Status: DISCONTINUED | OUTPATIENT
Start: 2021-09-04 | End: 2021-09-04

## 2021-09-04 RX ORDER — NALBUPHINE HYDROCHLORIDE 10 MG/ML
2.5-5 INJECTION, SOLUTION INTRAMUSCULAR; INTRAVENOUS; SUBCUTANEOUS EVERY 6 HOURS PRN
Status: DISCONTINUED | OUTPATIENT
Start: 2021-09-04 | End: 2021-09-06 | Stop reason: HOSPADM

## 2021-09-04 RX ORDER — MISOPROSTOL 200 UG/1
400 TABLET ORAL
Status: DISCONTINUED | OUTPATIENT
Start: 2021-09-04 | End: 2021-09-04

## 2021-09-04 RX ORDER — CARBOPROST TROMETHAMINE 250 UG/ML
250 INJECTION, SOLUTION INTRAMUSCULAR
Status: DISCONTINUED | OUTPATIENT
Start: 2021-09-04 | End: 2021-09-06 | Stop reason: HOSPADM

## 2021-09-04 RX ORDER — BISACODYL 10 MG
10 SUPPOSITORY, RECTAL RECTAL DAILY PRN
Status: DISCONTINUED | OUTPATIENT
Start: 2021-09-04 | End: 2021-09-06 | Stop reason: HOSPADM

## 2021-09-04 RX ORDER — SODIUM CHLORIDE, SODIUM LACTATE, POTASSIUM CHLORIDE, CALCIUM CHLORIDE 600; 310; 30; 20 MG/100ML; MG/100ML; MG/100ML; MG/100ML
INJECTION, SOLUTION INTRAVENOUS
Status: COMPLETED
Start: 2021-09-04 | End: 2021-09-04

## 2021-09-04 RX ORDER — NALOXONE HYDROCHLORIDE 0.4 MG/ML
0.4 INJECTION, SOLUTION INTRAMUSCULAR; INTRAVENOUS; SUBCUTANEOUS
Status: DISCONTINUED | OUTPATIENT
Start: 2021-09-04 | End: 2021-09-04

## 2021-09-04 RX ORDER — TRANEXAMIC ACID 10 MG/ML
1 INJECTION, SOLUTION INTRAVENOUS EVERY 30 MIN PRN
Status: DISCONTINUED | OUTPATIENT
Start: 2021-09-04 | End: 2021-09-06 | Stop reason: HOSPADM

## 2021-09-04 RX ORDER — METHYLERGONOVINE MALEATE 0.2 MG/ML
200 INJECTION INTRAVENOUS
Status: DISCONTINUED | OUTPATIENT
Start: 2021-09-04 | End: 2021-09-06 | Stop reason: HOSPADM

## 2021-09-04 RX ORDER — MISOPROSTOL 200 UG/1
800 TABLET ORAL
Status: DISCONTINUED | OUTPATIENT
Start: 2021-09-04 | End: 2021-09-04

## 2021-09-04 RX ORDER — BUPIVACAINE HYDROCHLORIDE AND EPINEPHRINE 2.5; 5 MG/ML; UG/ML
INJECTION, SOLUTION INFILTRATION; PERINEURAL PRN
Status: DISCONTINUED | OUTPATIENT
Start: 2021-09-04 | End: 2021-09-04

## 2021-09-04 RX ORDER — ACETAMINOPHEN 325 MG/1
650 TABLET ORAL EVERY 4 HOURS PRN
Status: DISCONTINUED | OUTPATIENT
Start: 2021-09-04 | End: 2021-09-06 | Stop reason: HOSPADM

## 2021-09-04 RX ORDER — OXYTOCIN 10 [USP'U]/ML
10 INJECTION, SOLUTION INTRAMUSCULAR; INTRAVENOUS
Status: DISCONTINUED | OUTPATIENT
Start: 2021-09-04 | End: 2021-09-06 | Stop reason: HOSPADM

## 2021-09-04 RX ORDER — LIDOCAINE 40 MG/G
CREAM TOPICAL
Status: DISCONTINUED | OUTPATIENT
Start: 2021-09-04 | End: 2021-09-04

## 2021-09-04 RX ORDER — NALOXONE HYDROCHLORIDE 0.4 MG/ML
0.2 INJECTION, SOLUTION INTRAMUSCULAR; INTRAVENOUS; SUBCUTANEOUS
Status: DISCONTINUED | OUTPATIENT
Start: 2021-09-04 | End: 2021-09-04

## 2021-09-04 RX ORDER — OXYTOCIN 10 [USP'U]/ML
INJECTION, SOLUTION INTRAMUSCULAR; INTRAVENOUS
Status: DISCONTINUED
Start: 2021-09-04 | End: 2021-09-05 | Stop reason: HOSPADM

## 2021-09-04 RX ORDER — METOCLOPRAMIDE HYDROCHLORIDE 5 MG/ML
10 INJECTION INTRAMUSCULAR; INTRAVENOUS EVERY 6 HOURS PRN
Status: DISCONTINUED | OUTPATIENT
Start: 2021-09-04 | End: 2021-09-04

## 2021-09-04 RX ORDER — MISOPROSTOL 200 UG/1
400 TABLET ORAL
Status: DISCONTINUED | OUTPATIENT
Start: 2021-09-04 | End: 2021-09-06 | Stop reason: HOSPADM

## 2021-09-04 RX ORDER — METHYLERGONOVINE MALEATE 0.2 MG/ML
200 INJECTION INTRAVENOUS
Status: DISCONTINUED | OUTPATIENT
Start: 2021-09-04 | End: 2021-09-04

## 2021-09-04 RX ORDER — LIDOCAINE HYDROCHLORIDE 10 MG/ML
INJECTION, SOLUTION EPIDURAL; INFILTRATION; INTRACAUDAL; PERINEURAL
Status: DISCONTINUED
Start: 2021-09-04 | End: 2021-09-05 | Stop reason: HOSPADM

## 2021-09-04 RX ORDER — OXYTOCIN/0.9 % SODIUM CHLORIDE 30/500 ML
100-340 PLASTIC BAG, INJECTION (ML) INTRAVENOUS CONTINUOUS PRN
Status: DISCONTINUED | OUTPATIENT
Start: 2021-09-04 | End: 2021-09-04

## 2021-09-04 RX ORDER — OXYTOCIN/0.9 % SODIUM CHLORIDE 30/500 ML
340 PLASTIC BAG, INJECTION (ML) INTRAVENOUS CONTINUOUS PRN
Status: DISCONTINUED | OUTPATIENT
Start: 2021-09-04 | End: 2021-09-06 | Stop reason: HOSPADM

## 2021-09-04 RX ORDER — OXYTOCIN 10 [USP'U]/ML
10 INJECTION, SOLUTION INTRAMUSCULAR; INTRAVENOUS
Status: DISCONTINUED | OUTPATIENT
Start: 2021-09-04 | End: 2021-09-04

## 2021-09-04 RX ORDER — PROCHLORPERAZINE 25 MG
25 SUPPOSITORY, RECTAL RECTAL EVERY 12 HOURS PRN
Status: DISCONTINUED | OUTPATIENT
Start: 2021-09-04 | End: 2021-09-04

## 2021-09-04 RX ORDER — IBUPROFEN 600 MG/1
600 TABLET, FILM COATED ORAL
Status: DISCONTINUED | OUTPATIENT
Start: 2021-09-04 | End: 2021-09-04

## 2021-09-04 RX ORDER — HYDROCORTISONE 2.5 %
CREAM (GRAM) TOPICAL 3 TIMES DAILY PRN
Status: DISCONTINUED | OUTPATIENT
Start: 2021-09-04 | End: 2021-09-06 | Stop reason: HOSPADM

## 2021-09-04 RX ORDER — OXYTOCIN/0.9 % SODIUM CHLORIDE 30/500 ML
PLASTIC BAG, INJECTION (ML) INTRAVENOUS
Status: COMPLETED
Start: 2021-09-04 | End: 2021-09-04

## 2021-09-04 RX ORDER — DOCUSATE SODIUM 100 MG/1
100 CAPSULE, LIQUID FILLED ORAL DAILY
Status: DISCONTINUED | OUTPATIENT
Start: 2021-09-05 | End: 2021-09-06 | Stop reason: HOSPADM

## 2021-09-04 RX ORDER — MISOPROSTOL 200 UG/1
TABLET ORAL
Status: DISCONTINUED
Start: 2021-09-04 | End: 2021-09-05 | Stop reason: HOSPADM

## 2021-09-04 RX ORDER — FENTANYL CITRATE-0.9 % NACL/PF 10 MCG/ML
100 PLASTIC BAG, INJECTION (ML) INTRAVENOUS EVERY 5 MIN PRN
Status: DISCONTINUED | OUTPATIENT
Start: 2021-09-04 | End: 2021-09-04 | Stop reason: HOSPADM

## 2021-09-04 RX ORDER — METOCLOPRAMIDE 10 MG/1
10 TABLET ORAL EVERY 6 HOURS PRN
Status: DISCONTINUED | OUTPATIENT
Start: 2021-09-04 | End: 2021-09-04

## 2021-09-04 RX ORDER — CARBOPROST TROMETHAMINE 250 UG/ML
250 INJECTION, SOLUTION INTRAMUSCULAR
Status: DISCONTINUED | OUTPATIENT
Start: 2021-09-04 | End: 2021-09-04

## 2021-09-04 RX ORDER — OXYTOCIN/0.9 % SODIUM CHLORIDE 30/500 ML
340 PLASTIC BAG, INJECTION (ML) INTRAVENOUS CONTINUOUS PRN
Status: COMPLETED | OUTPATIENT
Start: 2021-09-04 | End: 2021-09-04

## 2021-09-04 RX ORDER — FENTANYL/BUPIVACAINE/NS/PF 2-1250MCG
PLASTIC BAG, INJECTION (ML) INJECTION
Status: COMPLETED
Start: 2021-09-04 | End: 2021-09-04

## 2021-09-04 RX ORDER — KETOROLAC TROMETHAMINE 30 MG/ML
30 INJECTION, SOLUTION INTRAMUSCULAR; INTRAVENOUS
Status: DISCONTINUED | OUTPATIENT
Start: 2021-09-04 | End: 2021-09-04

## 2021-09-04 RX ORDER — OXYTOCIN/0.9 % SODIUM CHLORIDE 30/500 ML
1-24 PLASTIC BAG, INJECTION (ML) INTRAVENOUS CONTINUOUS
Status: DISCONTINUED | OUTPATIENT
Start: 2021-09-04 | End: 2021-09-04

## 2021-09-04 RX ORDER — FENTANYL CITRATE 50 UG/ML
50-100 INJECTION, SOLUTION INTRAMUSCULAR; INTRAVENOUS
Status: DISCONTINUED | OUTPATIENT
Start: 2021-09-04 | End: 2021-09-04

## 2021-09-04 RX ORDER — ONDANSETRON 2 MG/ML
4 INJECTION INTRAMUSCULAR; INTRAVENOUS EVERY 6 HOURS PRN
Status: DISCONTINUED | OUTPATIENT
Start: 2021-09-04 | End: 2021-09-04

## 2021-09-04 RX ORDER — PROCHLORPERAZINE MALEATE 10 MG
10 TABLET ORAL EVERY 6 HOURS PRN
Status: DISCONTINUED | OUTPATIENT
Start: 2021-09-04 | End: 2021-09-04

## 2021-09-04 RX ORDER — MISOPROSTOL 200 UG/1
800 TABLET ORAL
Status: DISCONTINUED | OUTPATIENT
Start: 2021-09-04 | End: 2021-09-06 | Stop reason: HOSPADM

## 2021-09-04 RX ORDER — TRANEXAMIC ACID 10 MG/ML
1 INJECTION, SOLUTION INTRAVENOUS EVERY 30 MIN PRN
Status: DISCONTINUED | OUTPATIENT
Start: 2021-09-04 | End: 2021-09-04

## 2021-09-04 RX ORDER — IBUPROFEN 800 MG/1
800 TABLET, FILM COATED ORAL EVERY 6 HOURS PRN
Status: DISCONTINUED | OUTPATIENT
Start: 2021-09-05 | End: 2021-09-06 | Stop reason: HOSPADM

## 2021-09-04 RX ADMIN — SODIUM CHLORIDE, POTASSIUM CHLORIDE, SODIUM LACTATE AND CALCIUM CHLORIDE: 600; 310; 30; 20 INJECTION, SOLUTION INTRAVENOUS at 19:00

## 2021-09-04 RX ADMIN — Medication: at 14:43

## 2021-09-04 RX ADMIN — ACETAMINOPHEN 650 MG: 325 TABLET, FILM COATED ORAL at 23:42

## 2021-09-04 RX ADMIN — IBUPROFEN 600 MG: 600 TABLET ORAL at 21:42

## 2021-09-04 RX ADMIN — SODIUM CHLORIDE, POTASSIUM CHLORIDE, SODIUM LACTATE AND CALCIUM CHLORIDE 500 ML: 600; 310; 30; 20 INJECTION, SOLUTION INTRAVENOUS at 14:17

## 2021-09-04 RX ADMIN — FENTANYL CITRATE 50 MCG: 50 INJECTION INTRAMUSCULAR; INTRAVENOUS at 12:53

## 2021-09-04 RX ADMIN — Medication 340 ML/HR: at 21:44

## 2021-09-04 RX ADMIN — BUPIVACAINE HYDROCHLORIDE AND EPINEPHRINE BITARTRATE 10 ML: 2.5; .005 INJECTION, SOLUTION INFILTRATION; PERINEURAL at 20:38

## 2021-09-04 RX ADMIN — FENTANYL CITRATE 50 MCG: 50 INJECTION INTRAMUSCULAR; INTRAVENOUS at 11:33

## 2021-09-04 RX ADMIN — Medication: at 19:32

## 2021-09-04 ASSESSMENT — MIFFLIN-ST. JEOR: SCORE: 1380.11

## 2021-09-04 NOTE — PLAN OF CARE
D: Patient is comfortable with her epidural. Multiple position changes provided and AROM performed at 1800 with clear fluid. Ileana to recheck her cervix at 1900 to see if pitocin is needed. P: Continue to monitor.

## 2021-09-04 NOTE — PROVIDER NOTIFICATION
09/04/21 1400   Provider Notification   Provider Name/Title Ileana CNM   Method of Notification At Bedside   Notification Reason Pain;SVE   D: Patient requesting an epidural for pain. Ileana called to the room to assess cervix. IV bolus started. P: Continue to monitor.

## 2021-09-04 NOTE — PROVIDER NOTIFICATION
09/04/21 0750   Provider Notification   Provider Name/Title Ileana De La Garza CNM   Method of Notification Electronic Page   Notification Reason Patient Arrived   D: Patient arrived to labor and delivery with her mother stating contractions every couple of minutes that started last night and got stronger at 0300 this morning. Denies ROM, bleeding. Placed on monitor, admission completed and CNM notified of arrival and lower fetal heart rate. P: Continue to monitor.

## 2021-09-04 NOTE — PROGRESS NOTES
"Labor progress note    S:  Pt has been comfortable with epidural  Resting  Is agreeable to cervix exam and plan of care discussion  Discussed rupturing membranes to faciliate labor  Progress has been slow     O:  Blood pressure 99/55, temperature 98.3  F (36.8  C), temperature source Oral, resp. rate 16, height 1.62 m (5' 3.78\"), weight 66.9 kg (147 lb 6.4 oz), last menstrual period 2020, SpO2 98 %, not currently breastfeeding.  General appearance: comfortable.  Contractions: Every 3-4 minutes. 60-70 seconds duration.  Palpate: moderate.  FHT: Baseline 120 with mod  variability. Accelerations are present. No  decelerations present.  AROM: clear fluid.  Pelvic exam: 7/ 100%/ Mid/ soft/ -1  A:   IUP @ 39w5d active labor protracted progress   Fetal Heart rate tracing Category category one  GBS- negative  Patient Active Problem List   Diagnosis     Anemia     Vitamin D deficiency     Normal pregnancy in first trimester     Family history of diabetes mellitus in mother     Thrombocytopenia (H)     Labor and delivery, indication for care         P:  Pain medication epidural   Anticipate   Labor augmentation with AROM   Consider IV pitocin in 1-2 hours if minimal progress or spaced contractions   reevaluate in 2-4 hours/PRN     EDY Mclain CNM  "

## 2021-09-04 NOTE — ANESTHESIA PROCEDURE NOTES
Epidural catheter Procedure Note  Pre-Procedure   Staff -        Anesthesiologist:  Shannon Gonzalez MD       Performed By: anesthesiologist       Procedure Start/Stop Times: 9/4/2021 2:36 PM and 9/4/2021 2:59 PM       Pre-Anesthestic Checklist: patient identified, IV checked, risks and benefits discussed, informed consent, monitors and equipment checked, pre-op evaluation, at physician/surgeon's request and post-op pain management  Timeout:       Correct Patient: Yes        Correct Procedure: Yes        Correct Site: Yes        Correct Position: Yes   Procedure Documentation  Procedure: epidural catheter       Patient Position: sitting       Patient Prep/Sterile Barriers: sterile gloves, mask, patient draped       Skin prep: Chloraprep       Local skin infiltrated with 3 mL of 1% lidocaine.  (midline approach).       Technique: LORT saline        SIMONE at 5 cm.       Needle Type: [employer] needle       Needle Gauge: 17.        Needle Length (Inches): 3.5        Catheter: 19 G.         Catheter threaded easily.         5 cm epidural space.         Threaded 10 cm at skin.         # of attempts: 2 and  # of redirects:  1    Assessment/Narrative         Paresthesias: No.       Test dose of 3 mL lidocaine 1.5% w/ 1:200,000 epinephrine at 15:40 CDT.         Test dose negative, 3 minutes after injection, for signs of intravascular, subdural, or intrathecal injection.       Insertion/Infusion Method: LORT saline       Aspiration negative for Heme or CSF via Epidural Catheter.    Medication(s) Administered   0.125% Bupivacaine + 2 mcg/mL Fentanyl via CADD (Epidural), 8 mL  Medication Administration Time: 9/4/2021 2:50 PM

## 2021-09-04 NOTE — PROGRESS NOTES
"Labor progress note    S:  Pt is more uncomfortable  Had fentanyl x 1 at 1130  Coping well     O:  Blood pressure 111/57, temperature 98  F (36.7  C), temperature source Oral, resp. rate 16, height 1.62 m (5' 3.78\"), weight 66.9 kg (147 lb 6.4 oz), last menstrual period 2020, not currently breastfeeding.  General appearance: uncomfortable with contractions.  Contractions: Every 3 minutes. 60-70 seconds duration.  Palpate: moderate.  FHT: Baseline 115 with mod variability. Accelerations arepresent. no decelerations present.  ROM: not ruptured.   Pelvic exam: 5/ 80%/ Mid/ soft/ -2  A:  IUP @ 39w5d early labor   Fetal Heart rate tracing Category category one  GBS- negative  Patient Active Problem List   Diagnosis     Anemia     Vitamin D deficiency     Normal pregnancy in first trimester     Family history of diabetes mellitus in mother     Thrombocytopenia (H)     Labor and delivery, indication for care         P:  comfort measures prn   Pain medication fentanyl   Anticipate   MD consultant on call / available prn  reevaluate in 2-4 hours/PRN     EDY MclainM  "

## 2021-09-04 NOTE — PROGRESS NOTES
"Labor progress note    S:  Pt is more uncomfortable  Requesting cervical check  Requesting epidural  Has been upright changing positions walking in room  Mom at her side     O:  Blood pressure 111/57, temperature 98  F (36.7  C), temperature source Oral, resp. rate 16, height 1.62 m (5' 3.78\"), weight 66.9 kg (147 lb 6.4 oz), last menstrual period 2020, not currently breastfeeding.  General appearance: uncomfortable with contractions.  Contractions: Every 2-3 minutes. 60-80 seconds duration.  Palpate: moderate.  FHT: Baseline 120 with mod  variability. Accelerations are present. No  decelerations present.  ROM: not ruptured.   Pelvic exam: 6/ 90%/ Mid/ soft/ -2 BBOW   Platelets are 111   A:  IUP @ 39w5d active labor   Fetal Heart rate tracing Category category one  GBS- negative  Patient Active Problem List   Diagnosis     Anemia     Vitamin D deficiency     Normal pregnancy in first trimester     Family history of diabetes mellitus in mother     Thrombocytopenia (H)     Labor and delivery, indication for care         P:  Pain medication epidural now   Anticipate   MD consultant on call Dr Manuel / available prn  reevaluate in 2-4 hours/PRN     EDY Mclain CNM  "

## 2021-09-04 NOTE — ANESTHESIA PREPROCEDURE EVALUATION
Anesthesia Pre-Procedure Evaluation    Patient: Xiao Maguire   MRN: [id number] : 1995        Preoperative Diagnosis: * No surgery found *   Procedure :      Past Medical History:   Diagnosis Date     Known health problems: none       Past Surgical History:   Procedure Laterality Date     NO HISTORY OF SURGERY        No Known Allergies   Social History     Tobacco Use     Smoking status: Never Smoker     Smokeless tobacco: Never Used   Substance Use Topics     Alcohol use: Not Currently      Wt Readings from Last 1 Encounters:   21 66.9 kg (147 lb 6.4 oz)        Anesthesia Evaluation   Pt has not had prior anesthetic         ROS/MED HX  ENT/Pulmonary:  - neg pulmonary ROS     Neurologic:  - neg neurologic ROS     Cardiovascular:  - neg cardiovascular ROS     METS/Exercise Tolerance:     Hematologic:     (+) thrombocytopenia (plts 110), anemia,     Musculoskeletal:       GI/Hepatic:       Renal/Genitourinary:       Endo:  - neg endo ROS     Psychiatric/Substance Use:       Infectious Disease:       Malignancy:       Other:            Physical Exam    Airway        Mallampati: II   TM distance: > 3 FB   Neck ROM: full   Mouth opening: > 3 cm    Respiratory Devices and Support         Dental  no notable dental history         Cardiovascular   cardiovascular exam normal          Pulmonary   pulmonary exam normal                OUTSIDE LABS:  CBC:   CBC RESULTS: Recent Labs   Lab Test 21  0829 21  1420 21  1620 02/10/21  1601   WBC 9.3 6.9 8.0 5.4   HGB 11.9 11.7 8.4* 11.0*   HCT 35.8 36.5 27.0* 34.3*   * 103* 141* 171       Lab Results   Component Value Date    WBC 9.3 2021    WBC 6.9 2021    HGB 11.9 2021    HGB 11.7 2021    HCT 35.8 2021    HCT 36.5 2021     (L) 2021     (L) 2021     BMP: No results found for: NA, POTASSIUM, CHLORIDE, CO2, BUN, CR, GLC  COAGS: No results found for: PTT, INR, FIBR  POC: No results  found for: BGM, HCG, HCGS  HEPATIC: No results found for: ALBUMIN, PROTTOTAL, ALT, AST, GGT, ALKPHOS, BILITOTAL, BILIDIRECT, RUPAL  OTHER: No results found for: PH, LACT, A1C, GISSELLE, PHOS, MAG, LIPASE, AMYLASE, TSH, T4, T3, CRP, SED    Anesthesia Plan    ASA Status:  2      Anesthesia Type: Epidural.              Consents    Anesthesia Plan(s) and associated risks, benefits, and realistic alternatives discussed. Questions answered and patient/representative(s) expressed understanding.     - Discussed with:  Patient         Postoperative Care            Comments:    Risks of epidural explained including risk of postdural puncture headache, hypotension, nausea/vommiting, infection, nerve injury, failed block/need for catheter replacement, and rare events including serious nerve injury paralysis or high block leading to cardiopulmonary arrest.         neg OB ROS.       Shannon Gonzalez MD

## 2021-09-04 NOTE — H&P
"ADMIT NOTE  =================  39w5d    Xiao Maguire is a 26 year old female with an Patient's last menstrual period was 2020 (exact date). and Estimated Date of Delivery: Sep 6, 2021 [facility] 2021 at 8:20 AM in early labor.     HPI  ================  Pt presents with family member reports onset of stronger regular contractions [id number]   Denies fever, cough, SOB or chest pain. Denies having contact with anyone who is Covid-19 positive. Agreeable to Covid-19 testing  Contractions- every 3-4 minutes  Fetal movement- active  ROM- no.  Vaginal bleeding- none  GBS- negative  FOB- is involved,   Other labor support- female family member   Weight gain- 147 - 132 lbs, Total weight gain- 15 lbs  Height- 5'4\"  BMI- 22  First prenatal visit at 10 weeks, Total visits- 6    PROBLEM LIST  =================  Patient Active Problem List    Diagnosis Date Noted     Labor and delivery, indication for care 2021     Priority: Medium     Thrombocytopenia (H) 2021     Priority: Medium     21: plts 103, needs weekly CBC. Consider anesthesia consult if less than 100.        Family history of diabetes mellitus in mother 2021     Priority: Medium     NEeds early GCT--pt returned to care at 22 wks--too late for early GCT, will come in a few weeks for visit andGCT  EFW at 20wk--93%       Normal pregnancy in first trimester 2021     Priority: Medium     [facility] CNM pt  Partner's name: Janett   [X] Entered on Epic list  [X] NOB folder  [X] Dating LMP c/w 10 week US  [X] First tri screen declined  [ ] QS/AFP ordered declined  [X] Fetal anatomy US ordered  [x ] Rubella immune  [x ] Hep B immune    [NA ] Started ASA   [x ] NO  plan utox in labor   _____________________________________  [ ] EOB folder  [ ] PP Contraception plan: If tubal,consent date:  [ ] Labor plans:  [ ] :  [ ] Infant feeding plan  [ ] FLU shot  [ ] TDAP given    [ ] Waterbirth declines, consent " done  [ ] GCT, passed  ________________________________________  [ ] GBS pos; neg  [ ] OTC PP meds sent  [ ] Planning CS-ERAS pkt    Anatomy scan: 18wks: EFW 346g   93%        Hadlock         Vitamin D deficiency 2021     Priority: Medium     21- pt was prescribed 2,000 at intake. Notify at next appt she should take 4,000 daily.        Anemia 02/10/2021     Priority: Medium     21: Has started IV iron transfusion  21: hgb 11.7         HISTORIES  ============  No Known Allergies  Past Medical History:   Diagnosis Date     Known health problems: none      Past Surgical History:   Procedure Laterality Date     NO HISTORY OF SURGERY     .  Family History   Problem Relation Age of Onset     Diabetes Mother      No Known Problems Father      No Known Problems Sister      No Known Problems Brother      No Known Problems Brother      No Known Problems Brother      No Known Problems Brother      No Known Problems Brother      No Known Problems Sister      No Known Problems Sister      No Known Problems Sister      Social History     Tobacco Use     Smoking status: Never Smoker     Smokeless tobacco: Never Used   Substance Use Topics     Alcohol use: Not Currently     OB History    Para Term  AB Living   4 3 3 0 0 3   SAB TAB Ectopic Multiple Live Births   0 0 0 0 3      # Outcome Date GA Lbr Shashi/2nd Weight Sex Delivery Anes PTL Lv   4 Current            3 Term 12/04/15 40w0d  2.722 kg (6 lb) F  EPI  [facility]   2 Term 10/22/13 40w0d  2.722 kg (6 lb) F  None  [facility]   1 Term 12 40w0d  2.892 kg (6 lb 6 oz) M  EPI  SIMONA      Obstetric Comments   No HTN, GDM, PPD, or PPH. Anemia in pregnancy. First in [place] and 2nd and 3th in [place].         LABS:   ===========  Prenatal Labs:  Rhogam not indicated   Lab Results   Component Value Date    ABO O 02/10/2021    RH Pos 02/10/2021    AS Neg 02/10/2021    RUQIGG 181 02/10/2021    HEPBANG Nonreactive 02/10/2021    HGB 11.7 2021     HIAGAB Nonreactive 02/10/2021    GLU1 129 05/27/2021     Rubella immune  : GBS  NEG   Other labs:  COVID-19 PCR Results    COVID-19 PCR Results   No data to display.         COVID-19 Antibody Results, Testing for Immunity    COVID-19 Antibody Results, Testing for Immunity   No data to display.            No results found for this or any previous visit (from the past 24 hour(s)).    ROS  =========  Pt denies significant respiratory, cardiovacular, GI, or muscular/skeletalcomplaints.    See RN data base ROS.       PHYSICAL EXAM:  ===============  LMP 11/30/2020 (Exact Date)   General appearance: uncomfortable with contractions  GENERAL APPEARANCE: healthy, alert and no distress  RESP: lungs clear to auscultation - no rales, rhonchi or wheezes  CV: regular rates and rhythm, normal S1 S2, no S3 or S4 and no murmur,and no varicosities  ABDOMEN:  soft, nontender, no epigastric pain  SKIN: no suspicious lesions or rashes  NEURO: Denies headache, blurred vision, other vision changes  PSYCH: mentation appears normal. and affect normal/bright  Legs: Reflexes normal bilaterally     Abdomen: gravid, vertex fetus per Leopold's, non-tender between contractions.   Cephalic presentation confirmed by BSUS  EFW-  7 3/4 lbs.   CONTRACTIONS: every 2-4 minutes  FETAL HEART TONES: continuous EFM- baseline 115 with moderate variability and positive accelerations. No decelerations.  PELVIC EXAM: 3/ 75%/ Posterior/ soft/ -2 BBOW   BLOODY SHOW: no   ROM:no  FLUID: none  ROMPLUS: not done    # Pain Assessment:   - Xiao is experiencing pain due to labor . Pain management was discussed and the plan was created in a collaborative fashion.  Xiao's response to the current recommendations: engaged  Open to pain meds when requests           ASSESSMENT:  ==============  IUP @ 39w5d admitted in early labor   NST REACTIVE baseline 115   Fetal Heart Tones - category one  GBS- negative  Covid- pending    Patient Active Problem List   Diagnosis      Anemia     Vitamin D deficiency     Normal pregnancy in first trimester     Family history of diabetes mellitus in mother     Thrombocytopenia (H)     Labor and delivery, indication for care       PLAN:  ===========  Admit - see IP orders  MD consultant on call Jose / available prn  Ambulation, hydration, position changes, birthing ball and tub options to facilitate labor reviewed with pt .  Anticipate   EDY MclainM

## 2021-09-05 LAB — HGB BLD-MCNC: 11.1 G/DL (ref 11.7–15.7)

## 2021-09-05 PROCEDURE — 85018 HEMOGLOBIN: CPT | Performed by: MIDWIFE

## 2021-09-05 PROCEDURE — 120N000002 HC R&B MED SURG/OB UMMC

## 2021-09-05 PROCEDURE — 250N000013 HC RX MED GY IP 250 OP 250 PS 637: Performed by: MIDWIFE

## 2021-09-05 PROCEDURE — 36415 COLL VENOUS BLD VENIPUNCTURE: CPT | Performed by: MIDWIFE

## 2021-09-05 RX ORDER — IBUPROFEN 200 MG
600 TABLET ORAL EVERY 6 HOURS PRN
Qty: 50 TABLET | Refills: 0 | Status: SHIPPED | OUTPATIENT
Start: 2021-09-05 | End: 2023-09-14

## 2021-09-05 RX ORDER — ACETAMINOPHEN 325 MG/1
650 TABLET ORAL EVERY 4 HOURS PRN
Qty: 50 TABLET | Refills: 0 | Status: SHIPPED | OUTPATIENT
Start: 2021-09-05 | End: 2023-09-14

## 2021-09-05 RX ORDER — DOCUSATE SODIUM 100 MG/1
100 CAPSULE, LIQUID FILLED ORAL DAILY PRN
Qty: 20 CAPSULE | Refills: 0 | Status: SHIPPED | OUTPATIENT
Start: 2021-09-05 | End: 2023-09-14

## 2021-09-05 RX ADMIN — ACETAMINOPHEN 650 MG: 325 TABLET, FILM COATED ORAL at 07:59

## 2021-09-05 RX ADMIN — IBUPROFEN 800 MG: 800 TABLET, FILM COATED ORAL at 18:28

## 2021-09-05 RX ADMIN — DOCUSATE SODIUM 100 MG: 100 CAPSULE, LIQUID FILLED ORAL at 07:59

## 2021-09-05 RX ADMIN — ACETAMINOPHEN 650 MG: 325 TABLET, FILM COATED ORAL at 21:48

## 2021-09-05 RX ADMIN — IBUPROFEN 800 MG: 800 TABLET, FILM COATED ORAL at 03:46

## 2021-09-05 RX ADMIN — ACETAMINOPHEN 650 MG: 325 TABLET, FILM COATED ORAL at 03:46

## 2021-09-05 RX ADMIN — IBUPROFEN 800 MG: 800 TABLET, FILM COATED ORAL at 10:27

## 2021-09-05 RX ADMIN — ACETAMINOPHEN 650 MG: 325 TABLET, FILM COATED ORAL at 14:28

## 2021-09-05 NOTE — PLAN OF CARE
of viable baby boy with Ileana De La Garza CNM in attendance.  NICU present due to recurrent decels. Nursery RN Stacia present.  Infant with spontaneous cry, to mothers abdomen, dried and stimulated.  Apgars 8 and 9.  Placenta delivered without complication, Pitocin bolused, minor laceration no repair needed, deo cares provided.  Mother and baby in stable condition.

## 2021-09-05 NOTE — L&D DELIVERY NOTE
Delivery Summary    Xiao Maguire MRN# [id number]   Age: 26 year old YOB: 1995   Delivery Note  Pt presented in early labor   IUP at 39 weeks gestation delivered on 2021.     delivery of a viable Male infant.  Weight : 8 pounds 1 ounces   Apgars of 8 at 1 minute and 9 at 5 minutes.  Labor was augmented. AROM   Medications administered  in labor:  Pain Rx narcotics  and Epidural; Antibiotics No;   Perineum: Minor laceration - No repair  Placenta-mechanism: spontaneous, intact,  with a 3 vessel cord. IV oxytocin was given.  Estimated Blood Loss was 100.  Complications of pregnancy, labor and delivery: None  Birth attendants:EDY Mclain CNM, ELLYN  ASSESSMENT & PLAN:        Juju Maguire [[id number]]    Labor Event Times    Labor onset date: 21 Onset time:  3:00 AM   Dilation complete date: 21 Complete time:  8:30 AM      Labor Length    1st Stage (hrs): 5 (min): 30   2nd Stage (hrs): 12 (min): 51   3rd Stage (hrs): 0 (min): 5      Labor Events     labor?: No   steroids: None  Labor Type: Spontaneous, AROM     Antibiotics received during labor?: No     Rupture date/time: 21 1800   Rupture type: Artificial Rupture of Membranes  Fluid color: Clear     Augmentation: AROM  1:1 continuous labor support provided by?: RN, provider       Delivery/Placenta Date and Time    Delivery Date: 21 Delivery Time:  9:21 PM   Placenta Date/Time: 2021  9:26 PM  Oxytocin given at the time of delivery: after delivery of baby  Delivering clinician: Shaunna De La Garza APRN CNM          Vaginal Counts     Initial count performed by 2 team members:  Two Team Members   Ileana SNOW        Needles Suture Needles Sponges (RETIRED) Instruments   Initial counts 2  5    Added to count       Relief counts       Final counts 2  5          Placed during labor Accounted for at the end of labor   FSE No    IUPC No    Cervadil No               Final count performed by 2  team members:  Two Team Members   Ileana SNOW       Final count correct?: Yes     Apgars    Living status: Living   1 Minute 5 Minute 10 Minute 15 Minute 20 Minute   Skin color: 1  1       Heart rate: 2  2       Reflex irritability: 1  2       Muscle tone: 2  2       Respiratory effort: 2  2       Total: 8  9       Apgars assigned by: NICHOL SNOW CNP     Cord    Vessels: 3 Vessels    Cord Complications: None               Cord Blood Disposition: Lab    Gases Sent?: Yes    Delayed cord clamping?: Yes    Cord Clamping Delay (seconds): 31-60 seconds    Stem cell collection?: No        Resuscitation    Methods: None   Care at Delivery: NICU team called on behalf of Shaunna SNOW CNM for category II fetal heart tracing. Infant was placed upon his mother's abdomen after delivery and had poor tone, so received ~ 15 seconds of delayed cord clamping then brought to the radiant warmer where he was dried and stimulated. He became vigorous with HR 150s. He turned pink and remained vigorous. He was brought back to his mother and she was updated. To NBN for further management.  Nichol SNOW CNP 2021 9:38 PM     [place] Measurements    Weight: 8 lb 1.5 oz       Labor Events and Shoulder Dystocia    Fetal Tracing Prior to Delivery: Category 1  Shoulder dystocia present?: Neg     Delivery (Maternal) (Provider to Complete) ([id number])    Episiotomy: None  Perineal lacerations: 1st Repaired?: No   Repair suture: None  Genital tract inspection done: Pos     Blood Loss  Mother: Xiao Maguire #[id number]   Start of Mother's Information    Delivery Blood Loss  21 0300 - 21 2202    None           End of Mother's Information  Mother: AndrezXiao #[id number]          Delivery - Provider to Complete ([id number])    Delivering clinician: Shaunna De La Garza APRN CNM  CNVARINDER Care: Any CNM care in labor  Attempted Delivery Types (Choose all that apply): Spontaneous Vaginal  Delivery  Delivery Type (Choose the 1 that will go to the Birth History): Vaginal, Spontaneous                                 Placenta    Date/Time: 9/4/2021  9:26 PM  Removal: Spontaneous  Disposition: Hospital disposal           Anesthesia    Method: Epidural, INTRAVENOUS   Cervical dilation at placement: 4-7                Presentation and Position    Presentation: Vertex    Position: Left Occiput Anterior                 EDY Mclain CNM

## 2021-09-05 NOTE — PLAN OF CARE
Data: Xiao Maguire transferred to [id number] via wheelchair at 0000.  Baby transferred via parent's arms.  Action: Receiving unit notified of transfer: Yes. Patient and family notified of room change. Report given to KONG Gutierrez at 0015. Belongings sent to receiving unit. Accompanied by Registered Nurse. Oriented patient to surroundings. Call light within reach. ID bands double-checked with receiving RN.  Response: Patient tolerated transfer and is stable.

## 2021-09-05 NOTE — PLAN OF CARE
Pt stable throughout shift. Up ad branden in room, voiding w/o difficulty. Hgb 11.1, removed saline lock. Using abdominal binder, hot packs, and tylenol/ibuprofen for uterine cramping and mild backache. Needing breastfeeding assistance as infant is very sleepy, was asking for formula, able to hand express 4 mls instead to spoonfeed baby. Assisted to complete birth certificate, turned in. Trying to decide whether she wants Medela or Spectra breastpump for home.

## 2021-09-05 NOTE — PROGRESS NOTES
"Post Partum Note  SIGNIFICANT PROBLEMS:  Patient Active Problem List    Diagnosis Date Noted     Labor and delivery, indication for care 09/04/2021     Priority: Medium     Thrombocytopenia (H) 08/16/2021     Priority: Medium     8/16/21: plts 103, needs weekly CBC. Consider anesthesia consult if less than 100.        Family history of diabetes mellitus in mother 05/05/2021     Priority: Medium     NEeds early GCT--pt returned to care at 22 wks--too late for early GCT, will come in a few weeks for visit andGCT  EFW at 20wk--93%       Normal pregnancy in first trimester 02/16/2021     Priority: Medium     [facility] CNM pt  Partner's name: Janett   [X] Entered on Epic list  [X] NOB folder  [X] Dating LMP c/w 10 week US  [X] First tri screen declined  [ ] QS/AFP ordered declined  [X] Fetal anatomy US ordered  [x ] Rubella immune  [x ] Hep B immune    [NA ] Started ASA   [x ] NO  plan utox in labor   _____________________________________  [ ] EOB folder  [ ] PP Contraception plan: If tubal,consent date:  [ ] Labor plans:  [ ] :  [ ] Infant feeding plan  [ ] FLU shot  [ ] TDAP given    [ ] Waterbirth declines, consent done  [ ] GCT, passed  ________________________________________  [ ] GBS pos; neg  [ ] OTC PP meds sent  [ ] Planning CS-ERAS pkt    Anatomy scan: 18wks: EFW 346g   93%        Hadlock         Vitamin D deficiency 02/11/2021     Priority: Medium     21- pt was prescribed 2,000 at intake. Notify at next appt she should take 4,000 daily.        Anemia 02/10/2021     Priority: Medium     8/4/21: Has started IV iron transfusion  8/6/21: hgb 11.7         INTERVAL HISTORY:  /72   Pulse 85   Temp 98  F (36.7  C) (Oral)   Resp 18   Ht 1.62 m (5' 3.78\")   Wt 66.9 kg (147 lb 6.4 oz)   LMP 11/30/2020 (Exact Date)   SpO2 99%   Breastfeeding Unknown   BMI 25.48 kg/m    Pt stable, baby is rooming in. Feels well  Breast feeding status:initiated and going well.  other kids  Complications since 2 " hours post delivery: None  # Pain Assessment:  Current Pain Score 2021   Patient currently in pain? yes   - Xiao is experiencing pain due to . Pain management was discussed and the plan was created in a collaborative fashion.  Xiao's response to the current recommendations: engaged  - tylenol, ibuprofen.      Patient is tolerating activity well, Voiding without difficulty, cramping is relieved by Ibuprophen, lochia is decreasing and patient denies clots.  Perineal pain is is minimal.  The perineum laceration is well approximated    Physical Exam:  General: Bright affect, loving with baby. Family supportive.   Breasts: soft, nontender, nipples intact, no cracking, redness or bruising.   Abdomen: soft, nontender, fundus below U.   Lochia: small amount, rubra, no clots or odor.   Perineum: well-approximated.   Extremities: no edema, Kym's negative.     Postpartum hemoglobin   Hemoglobin   Date Value Ref Range Status   2021 11.1 (L) 11.7 - 15.7 g/dL Final   2021 8.4 (L) 11.7 - 15.7 g/dL Final     Blood type   Lab Results   Component Value Date    ABO O 02/10/2021       Lab Results   Component Value Date    RH Pos 02/10/2021     Rubella status   Lab Results   Component Value Date    RUQIGG 181 02/10/2021     Rubella: immune  History of depression: none. Postpartum depression warning signs reviewed.    ASSESSMENT/PLAN:  Normal postpartum exam , Stable Post-partum day #1  Complications:none  Plan d/c home tomorrow. Home Visit Ordered- No  RTC 6 weeks  Postpartum warning s/s reviewed, including bleeding/clots, fever, mastitis, or depression  Kegels/ crunches  Continue prenatal vitamins  Birthcontrol planned:none. Fertility and contraception options reviewed.  Current Discharge Medication List      START taking these medications    Details   docusate sodium (COLACE) 100 MG capsule Take 1 capsule (100 mg) by mouth daily as needed for constipation  Qty: 20 capsule, Refills: 0    Associated Diagnoses:  Single liveborn infant delivered vaginally         CONTINUE these medications which have CHANGED    Details   acetaminophen (TYLENOL) 325 MG tablet Take 2 tablets (650 mg) by mouth every 4 hours as needed for mild pain or fever  Qty: 50 tablet, Refills: 0    Associated Diagnoses: Single liveborn infant delivered vaginally      ibuprofen (ADVIL/MOTRIN) 200 MG tablet Take 3 tablets (600 mg) by mouth every 6 hours as needed for other (cramping)  Qty: 50 tablet, Refills: 0    Associated Diagnoses: Single liveborn infant delivered vaginally         CONTINUE these medications which have NOT CHANGED    Details   Cholecalciferol (VITAMIN D) 50 MCG ( UT) CAPS Take 2 capsules by mouth daily Take two capsules daily.  Qty: 180 capsule, Refills: 3    Associated Diagnoses: Encounter for supervision of normal pregnancy in first trimester, unspecified ; Family history of diabetes mellitus in mother; Vitamin D deficiency      Prenatal Vit-Fe Fumarate-FA (PRENATAL MULTIVITAMIN W/IRON) 27-0.8 MG tablet Take 1 tablet by mouth daily  Qty: 90 tablet, Refills: 3    Associated Diagnoses: Normal pregnancy in first trimester         STOP taking these medications       ferrous sulfate (FEROSUL) 325 (65 Fe) MG tablet Comments:   Reason for Stopping:         SENNA-docusate sodium (SENNA S) 8.6-50 MG tablet Comments:   Reason for Stopping:         vitamin C (ASCORBIC ACID) 100 MG tablet Comments:   Reason for Stopping:             EDY Mckeon CNM

## 2021-09-05 NOTE — PROVIDER NOTIFICATION
09/04/21 7636   Provider Notification   Provider Name/Title Dr. Gonzalez and Ileana RAGLAND   Method of Notification Phone   Notification Reason Pain;SVE   D: Patient very uncomfortable with contractions. Placed her on her right side and noted bright red bleeding on the epidural dressing and some noted on the chux and bed sheets. After the last position change the blood was not noted on the linens. Dr. Gonzalez called to the room to assess the bleeding and to address pain that the patient is experiencing. Patient also states that she may be feeling some pressure like the urge to push so Ileana called to room to assess to see if patient has progressed to complete. Patient upset and teary that the epidural is not working like it has for her previous deliveries. STAT labs ordered by Dr. Gonzalez and awaiting lab arrival. P: Report given to oncoming KONG Bowens.

## 2021-09-05 NOTE — PLAN OF CARE
Data: VSS, postpartum assessments WNL. She is voiding without difficulty, up ad branden, passing gas, eating and drinking normally. Lochia WNL, no clot seen. Breastfeeding infant independently. Taking Tylenol and Ibuprofen for cramping, heat pack provide and using medicated pads in perineum.  Action: Pt was seen holding baby and dozing, education provided on safe infant sleep and encouraged to baby back in bassinet and ask for help as need. Care plan reviewed, discussed infant feeding cues,hand breast milk expression with pt.  Response: Pt is agreeable with her plan of care. Positive attachment behaviors observed with infant. Support person present. Continue plan of care.

## 2021-09-05 NOTE — PLAN OF CARE
Patient arrived to [facility] unit via wheelchair at 0005,with belongings, accompanied by family, with infant in arms. Received report from Pa, RN and checked bands. Unit and room orientation completd. Call light given; no concerns present at this time. Continue with plan of care.

## 2021-09-06 VITALS
TEMPERATURE: 98.3 F | BODY MASS INDEX: 25.16 KG/M2 | DIASTOLIC BLOOD PRESSURE: 77 MMHG | RESPIRATION RATE: 20 BRPM | HEIGHT: 64 IN | HEART RATE: 76 BPM | SYSTOLIC BLOOD PRESSURE: 114 MMHG | WEIGHT: 147.4 LBS | OXYGEN SATURATION: 99 %

## 2021-09-06 PROCEDURE — 250N000013 HC RX MED GY IP 250 OP 250 PS 637: Performed by: MIDWIFE

## 2021-09-06 RX ADMIN — IBUPROFEN 800 MG: 800 TABLET, FILM COATED ORAL at 09:03

## 2021-09-06 RX ADMIN — IBUPROFEN 800 MG: 800 TABLET, FILM COATED ORAL at 00:24

## 2021-09-06 RX ADMIN — DOCUSATE SODIUM 100 MG: 100 CAPSULE, LIQUID FILLED ORAL at 09:02

## 2021-09-06 RX ADMIN — ACETAMINOPHEN 650 MG: 325 TABLET, FILM COATED ORAL at 09:02

## 2021-09-06 NOTE — DISCHARGE SUMMARY
[facility] Discharge Summary    Xiao Maguire MRN# [id number]   Age: 26 year old YOB: 1995     Date of Admission:  9/4/2021  Date of Discharge::  9/6/2021  Admitting Physician:  EDY Mclain CNM  Discharge Physician:  EDY Mclain CNM      Home clinic: [facility]          Admission Diagnoses:   Encounter for triage in pregnant patient [Z36.89]  Labor and delivery, indication for care [O75.9]          Discharge Diagnosis:     Normal spontaneous vaginal delivery  Intrauterine pregnancy at 39 weeks gestation          Procedures:     Procedure(s): No additional procedures performed                Medications Prior to Admission:     Medications Prior to Admission   Medication Sig Dispense Refill Last Dose     Cholecalciferol (VITAMIN D) 50 MCG (2000 UT) CAPS Take 2 capsules by mouth daily Take two capsules daily. 180 capsule 3 9/3/2021 at Unknown time     Prenatal Vit-Fe Fumarate-FA (PRENATAL MULTIVITAMIN W/IRON) 27-0.8 MG tablet Take 1 tablet by mouth daily 90 tablet 3 9/3/2021 at Unknown time     [DISCONTINUED] acetaminophen (TYLENOL) 325 MG tablet Take 2 tablets (650 mg) by mouth every 6 hours as needed for mild pain 100 tablet 0      [DISCONTINUED] acetaminophen (TYLENOL) 500 MG tablet Take 1-2 tablets (500-1,000 mg) by mouth every 6 hours as needed for mild pain 90 tablet 3      [DISCONTINUED] ferrous sulfate (FEROSUL) 325 (65 Fe) MG tablet Take 1 tablet (325 mg) by mouth daily (with breakfast) May increase to twice daily if tolerating once daily dosing.  Take with orange juice or ascorbic acid 180 tablet 1 9/3/2021 at Unknown time     [DISCONTINUED] ibuprofen (ADVIL/MOTRIN) 600 MG tablet Take 1 tablet (600 mg) by mouth every 6 hours as needed for moderate pain 90 tablet 3      [DISCONTINUED] SENNA-docusate sodium (SENNA S) 8.6-50 MG tablet Take 1 tablet by mouth At Bedtime 90 tablet 3      [DISCONTINUED] vitamin C (ASCORBIC ACID) 100 MG tablet Take 1/2  tablet by mouth daily with Ferrous sulfate.  May increase to twice daily if tolerating once daily dosing. 90 tablet 1              Discharge Medications:     Current Discharge Medication List      START taking these medications    Details   docusate sodium (COLACE) 100 MG capsule Take 1 capsule (100 mg) by mouth daily as needed for constipation  Qty: 20 capsule, Refills: 0    Associated Diagnoses: Single liveborn infant delivered vaginally         CONTINUE these medications which have CHANGED    Details   acetaminophen (TYLENOL) 325 MG tablet Take 2 tablets (650 mg) by mouth every 4 hours as needed for mild pain or fever  Qty: 50 tablet, Refills: 0    Associated Diagnoses: Single liveborn infant delivered vaginally      ibuprofen (ADVIL/MOTRIN) 200 MG tablet Take 3 tablets (600 mg) by mouth every 6 hours as needed for other (cramping)  Qty: 50 tablet, Refills: 0    Associated Diagnoses: Single liveborn infant delivered vaginally         CONTINUE these medications which have NOT CHANGED    Details   Cholecalciferol (VITAMIN D) 50 MCG (2000 UT) CAPS Take 2 capsules by mouth daily Take two capsules daily.  Qty: 180 capsule, Refills: 3    Associated Diagnoses: Encounter for supervision of normal pregnancy in first trimester, unspecified ; Family history of diabetes mellitus in mother; Vitamin D deficiency      Prenatal Vit-Fe Fumarate-FA (PRENATAL MULTIVITAMIN W/IRON) 27-0.8 MG tablet Take 1 tablet by mouth daily  Qty: 90 tablet, Refills: 3    Associated Diagnoses: Normal pregnancy in first trimester         STOP taking these medications       ferrous sulfate (FEROSUL) 325 (65 Fe) MG tablet Comments:   Reason for Stopping:         SENNA-docusate sodium (SENNA S) 8.6-50 MG tablet Comments:   Reason for Stopping:         vitamin C (ASCORBIC ACID) 100 MG tablet Comments:   Reason for Stopping:                     Consultations:   No consultations were requested during this admission          Brief History of  Labor:   Delivery Note  Pt presented in early labor   IUP at 39 weeks gestation delivered on 2021.     delivery of a viable Male infant.  Weight : 8 pounds 1 ounces   Apgars of 8 at 1 minute and 9 at 5 minutes.  Labor was augmented. AROM   Medications administered  in labor:  Pain Rx narcotics  and Epidural; Antibiotics No;   Perineum: Minor laceration - No repair  Placenta-mechanism: spontaneous, intact,  with a 3 vessel cord. IV oxytocin was given.  Estimated Blood Loss was 100.  Complications of pregnancy, labor and delivery: None  Birth attendants:EDY Mclain CNM, ELLYN     Assessment Day of Discharge      Breasts:nipples intact lactating   Fundus:FF U/2   Abdomen:soft NT   Lochia:small   Perineum:intact   Legs:neg [facility]            [facility] Course:   The patient's hospital course was unremarkable.  On discharge, her pain was well controlled. Vaginal bleeding is similar to peak menstrual flow.  Voiding without difficulty.  Ambulating well and tolerating a normal diet.  No fever.  Breastfeeding well.  Infant is stable.  No bowel movement yet.*  She was discharged on post-partum day #2.    Post-partum hemoglobin:   Hemoglobin   Date Value Ref Range Status   2021 11.1 (L) 11.7 - 15.7 g/dL Final   2021 8.4 (L) 11.7 - 15.7 g/dL Final             Discharge Instructions and Follow-Up:     Discharge diet: Regular   Discharge activity: Activity as tolerated   Discharge follow-up: Follow up with midwife in 2 and 6 weeks   Wound care: Drink plenty of fluids  Ice to area for comfort  Keep wound clean and dry           Discharge Disposition:     Discharged to home        EDY Mclain CNM

## 2021-09-06 NOTE — PLAN OF CARE
VSS. Afebrile. Up ad branden. Voiding and passing gas. Denies pain and declined pain meds. Breast feeding well. Discharge instructions and meds reviewed and given to pt. Discharged today with baby to home. Received breast pump.

## 2021-09-06 NOTE — PLAN OF CARE
Vital signs stable. Postpartum assessment WDL. Perineum with 1st degree lac, no repair. Pain controlled with medications. Patient ambulating independently. Intake and output adequate, patient voiding independently. Patient passing gas and has not had bowel movement. Breastfeeding on cue with minimal assist. Patient and infant bonding well. Will continue with current plan of care.

## 2021-09-27 ENCOUNTER — TELEPHONE (OUTPATIENT)
Dept: OBGYN | Facility: CLINIC | Age: 26
End: 2021-09-27

## 2021-09-27 NOTE — TELEPHONE ENCOUNTER
[facility] Call Center    Phone Message    May a detailed message be left on voicemail: yes     Reason for Call: Other: Pt would like a call back asap as she was told to make an appt after 2wks from her c section and next appt isn't until Oct and she doesn't think she should wait til then     Action Taken: Message routed to:  [facility] ([facility]): CARMINE    Travel Screening: Not Applicable

## 2023-07-12 NOTE — PROGRESS NOTES
"SUBJECTIVE:    26 year old, female, , 21w3d,  who presents to the clinic today for a new ob visit.   Feels well. Has started PNV, but states that she has run out.  Estimated Date of Delivery: Sep 6, 2021   Sep 6, 2021 is calculated from Patient's last menstrual period was 2020 (exact date).  She has not had bleeding since her LMP.   She has not had nausea. Weight loss has not occurred.   This was a planned pregnancy.   FOB is involved,  States  lives out of town, they last saw each other 2 months ago. Pt came with her brother today   Has not been here since intake    OTHER CONCERNS:   Appetite - Concerned that she is not eating enough. Dizziness and headache with food.   Eats meat only. \"normal [place] food\"  Difficulty sleeping - difficulty falling asleep. Has not tried anything to help.  Ramadan - wondering if it is okay for her to fast.   ===========================================  ROS  PSYCHIATRIC:  Denies   PHQ9: Last PHQ-9 score on record= No Value exists for the : HP#PHQ9  Social History     Tobacco Use     Smoking status: Never Smoker     Smokeless tobacco: Never Used   Substance Use Topics     Alcohol use: Not Currently     History   Drug Use Unknown     History   Smoking Status     Never Smoker   Smokeless Tobacco     Never Used     Social History    Substance and Sexual Activity      Alcohol use: Not Currently    Family History   Problem Relation Age of Onset     Diabetes Mother      No Known Problems Father      No Known Problems Sister      No Known Problems Brother      No Known Problems Brother      No Known Problems Brother      No Known Problems Brother      No Known Problems Brother      No Known Problems Sister      No Known Problems Sister      No Known Problems Sister      ============================================  MEDICAL HISTORY   No Known Allergies      Current Outpatient Medications:      acetaminophen (TYLENOL) 325 MG tablet, Take 2 tablets (650 mg) by mouth every " Detail Level: Detailed 6 hours as needed for mild pain, Disp: 100 tablet, Rfl: 0     Cholecalciferol (VITAMIN D) 50 MCG (2000 UT) CAPS, Take 2 capsules by mouth daily Take two capsules daily., Disp: 180 capsule, Rfl: 3     doxylamine (UNISOM SLEEPTABS) 25 MG TABS tablet, Take 0.5-1 tablets (12.5-25 mg) by mouth At Bedtime, Disp: 30 tablet, Rfl: 1     ondansetron (ZOFRAN) 4 MG tablet, Take 1 tablet (4 mg) by mouth every 8 hours as needed for nausea or vomiting, Disp: 20 tablet, Rfl: 0     Prenatal Vit-Fe Fumarate-FA (PRENATAL MULTIVITAMIN W/IRON) 27-0.8 MG tablet, Take 1 tablet by mouth daily, Disp: 90 tablet, Rfl: 3     Prenatal Vit-Fe Fumarate-FA (PRENATAL MULTIVITAMIN W/IRON) 27-0.8 MG tablet, Take 1 tablet by mouth daily, Disp: 90 tablet, Rfl: 3     vitamin D3 (CHOLECALCIFEROL) 50 mcg (2000 units) tablet, Take 1 tablet (50 mcg) by mouth daily Take one tablet daily., Disp: 90 tablet, Rfl: 3    Past Medical History:   Diagnosis Date     Known health problems: none        Past Surgical History:   Procedure Laterality Date     NO HISTORY OF SURGERY            OB History    Para Term  AB Living   4 3 3 0 0 3   SAB TAB Ectopic Multiple Live Births   0 0 0 0 3      # Outcome Date GA Lbr Shashi/2nd Weight Sex Delivery Anes PTL Lv   4 Current            3 Term 12/04/15 40w0d  2.722 kg (6 lb) F  EPI  SIMONA   2 Term 10/22/13 40w0d  2.722 kg (6 lb) F  None  SIMONA   1 Term 12 40w0d  2.892 kg (6 lb 6 oz) M  EPI  SIMONA      Obstetric Comments   No HTN, GDM, PPD, or PPH. Anemia in pregnancy. First in [place] and 2nd and 3th in [place].        GYN History- Denies Abnormal Pap Smears; last 3 years ago per pt.                        Cervical procedures: Denies                        History of STI: Denies    I personally reviewed the past social/family/medical and surgical history on the date of service.   I reviewed lab work done at Intake visit with patient.    OBJECTIVE:   PHYSICAL EXAM:  /72   Pulse 88   Wt 61.2  Add 50573 Cpt? (Important Note: In 2017 The Use Of 55314 Is Being Tracked By Cms To Determine Future Global Period Reimbursement For Global Periods): no Wound Evaluated By: Dr. Maria Teresa Cason kg (135 lb)   LMP 2020 (Exact Date)   Breastfeeding No   BMI 23.17 kg/m    BMI- Body mass index is 23.17 kg/m .,     GENERAL:  Pleasant pregnant female, alert, cooperative  and well groomed.  SKIN:  Warm and dry, without lesions or rashes  HEAD: Symmetrical features.  NECK:  Thyroid without enlargement and nodules.  Lymph nodes not palpable.   LUNGS:  Clear to auscultation.  BREAST:   No dominant, fixed or suspicious masses are noted.  No skin or nipple changes or axillary nodes.  Nipples everted.      HEART:  RRR. Grade 2 murmur noted on S1  ABDOMEN: Soft without masses , tenderness or organomegaly. Uterus palpable at size equal to dates.  No scars noted. Fetal heart tones present.  MUSCULOSKELETAL:  Full range of motion  EXTREMITIES:  No edema. No significant varicosities.   PELVIC EXAM:  GENITALIA: EGBUS  External genitalia, Bartholin's glands, urethra & [place]'s glands:normal. Vulva reveals no erythema or lesions.    VAGINA:  pink, normal rugae and discharge, no lesions, good tone.   CERVIX:  Closed, firm, without CMT.             UTERUS: nontender. Measuring at 23 cm.   ADNEXA:  Without masses or tenderness.   RECTAL:  Normal appearance.  Digital exam deferred.    ASSESSMENT:  Intrauterine pregnancy 21w3d size consistent with dates  Genetic Screening: Level 2 Ultrasound completed  Encounter Diagnoses   Name Primary?     Encounter for supervision of normal pregnancy in first trimester, unspecified  Yes     Normal pregnancy in first trimester      Family history of diabetes mellitus in mother      Vitamin D deficiency         PLAN:  Orders Placed This Encounter   Medications     Prenatal Vit-Fe Fumarate-FA (PRENATAL MULTIVITAMIN W/IRON) 27-0.8 MG tablet     Sig: Take 1 tablet by mouth daily     Dispense:  90 tablet     Refill:  3     Cholecalciferol (VITAMIN D) 50 MCG (2000) CAPS     Sig: Take 2 capsules by mouth daily Take two capsules daily.     Dispense:  180 capsule     Refill:  3   Xiao  Additional Comments: Scab will fall off on its own. \\nHe can RTC as needed for a wound check. was seen in clinic for NOB at 21w3d with [place]  called by phone. She is feeling well overall.    - Appetite: Encouraged patient to continue to eat as she is comfortable and to add in other vegetables and fruit as tolerated.     - Fasting: Discussed a risk-benefit approach to decision making around fasting for ramada. Discussed risk of hypoglycemia and dehydration to mother and fetus. Risk reduction strategies including sleeping later in the day to decrease hours without nutrition and awareness of health and snacking as needed if feeling unwell.     - Reviewed use of triage nurse line and contacting the on-call provider after hours for an urgent need such as fever, vaginal bleeding, bladder or vaginal infection, rupture of membranes,  or term labor.    - Reviewed best evidence for: weight gain for her weight and height for pregnancy:  RECOMMENDED WEIGHT GAIN: 25-35 lbs.   healthy diet and foods to avoid; exercise and activity during pregnancy;avoiding exposure to toxoplasmosis; and maintenance of a generally healthy lifestyle.   - Discussed the harms, benefits, side effects and alternative therapies for current prescribed and OTC medications.    - All pt'squestions discussed and answered.  Pt verbalized understanding of and agreement to plan of care.     - Continue scheduled prenatal care and prn if questions or concerns  Will do GCT next visit, too late for early GCT. Discussed EOB and Gct next visit    I, BETTY Collins, am serving as a scribe; to document services personally performed by  Maribell Pettit CNM based on data collection and the provider's statements to me.     BETTY Collins      I agree with the PFSH and ROS as completed by the student, except for changes made by me. The remainder of the encounter was performed by me and scribed by the student. The scribed note accurately reflects my personal services and decisions made by me.  Maribell Pettit, DELGADO, ELLYN,  APRN

## 2023-09-13 DIAGNOSIS — Z32.01 PREGNANCY TEST POSITIVE: Primary | ICD-10-CM

## 2023-09-14 ENCOUNTER — VIRTUAL VISIT (OUTPATIENT)
Dept: OBGYN | Facility: CLINIC | Age: 28
End: 2023-09-14
Attending: ADVANCED PRACTICE MIDWIFE
Payer: COMMERCIAL

## 2023-09-14 DIAGNOSIS — Z34.91 PRENATAL CARE IN FIRST TRIMESTER: Primary | ICD-10-CM

## 2023-09-14 RX ORDER — LOPERAMIDE HCL 2 MG
2 CAPSULE ORAL PRN
COMMUNITY
Start: 2023-04-24 | End: 2023-09-14

## 2023-09-14 RX ORDER — AZITHROMYCIN 250 MG/1
250 TABLET, FILM COATED ORAL DAILY
COMMUNITY
Start: 2022-11-17 | End: 2023-09-14

## 2023-09-14 RX ORDER — PSEUDOEPHED/ACETAMINOPH/DIPHEN 30MG-500MG
1000 TABLET ORAL EVERY 6 HOURS PRN
COMMUNITY
Start: 2023-04-24 | End: 2023-09-14

## 2023-09-14 NOTE — LETTER
2023       RE: Radha Vivas  [address]  [address]  [facility] [address]     Dear Colleague,    Thank you for referring your patient, Radha Vivas, to the [facility] [place] [facility]. Please see a copy of my visit note below.    [facility] RN Prenatal Intake note  Subjective     28 year old female newly pregnant.  LMP: 2023.    exact and regular cycles    Pregnancy is planned.    Partner/support person name and relationship -  Janett.        Symptoms since LMP include nausea, vomiting, breast tenderness, fatigue, and weight loss. Patient has tried these relief measures: diet modification, small frequent meals, increased rest, and increased fluids. Does not want to try medications yet but plans to talk about this during her appointment on .     OB HISTORY  OB History    Para Term  AB Living   5 4 4 0 0 4   SAB IAB Ectopic Multiple Live Births   0 0 0 0 4      # Outcome Date GA Lbr Shashi/2nd Weight Sex Delivery Anes PTL Lv   5 Current            4 Term 21 39w5d 05:30  12:51 3.67 kg (8 lb 1.5 oz) M Vag-Spont EPI, IV N SIMONA      Complications: Dysfunctional Labor      Name: LISSETTE HERRERA-RADHA      Apgar1: 8  Apgar5: 9   3 Term 12/04/15 40w0d  2.722 kg (6 lb) F  EPI  SIMONA   2 Term 10/22/13 40w0d  2.722 kg (6 lb) F  None  SIMONA   1 Term 12 40w0d  2.892 kg (6 lb 6 oz) M  EPI  SIMONA      Obstetric Comments   No HTN, GDM, PPD, or PPH. Anemia in pregnancy. First in [place] and 2nd and 3th in [place], 4th with [facility]         OB COMPLICATIONS  First Pregnancy No  GDM No Mother has diabetes.   HTNNo  Preeclampsia No   labor No   birth No   birth No  PP hemorrhage No  Retained placenta No  PP mood disorder No  Fetal anomaly No  FGR No  Macrosomia  No  3rd or 4th degree laceration  No  Shoulder dystocia No  NICU admit No      PERSONAL/SOCIAL HISTORY  *updated all  tabs in history    lives with their family.  Employment: Unemployed.  Her partner is in [place].   MENTAL HEALTH HISTORY:  N/A      - Current Medications    Current Outpatient Medications   Medication Sig Dispense Refill    Prenatal Vit-Fe Fumarate-FA (PRENATAL MULTIVITAMIN W/IRON) 27-0.8 MG tablet Take 1 tablet by mouth daily (Patient not taking: Reported on 9/14/2023) 90 tablet 3           - Co-morbids    Past Medical History:   Diagnosis Date    Anemia 02/10/2021    8/4/21: Has started IV iron transfusion  8/6/21: hgb 11.7    Family history of diabetes mellitus in mother 05/05/2021    NEeds early GCT--pt returned to care at 22 wks--too late for early GCT, will come in a few weeks for visit andGCT  EFW at 20wk--93%    Labor and delivery, indication for care 09/04/2021    Normal pregnancy in first trimester 02/16/2021    [facility] CNM pt  Partner's name: Janett   [X] Entered on Epic list  [X] NOB folder  [X] Dating LMP c/w 10 week US  [X] First tri screen declined  [ ] QS/AFP ordered declined  [X] Fetal anatomy US ordered  [x ] Rubella immune  [x ] Hep B immune     [NA ] Started ASA   [x ] NO  plan utox in labor   _____________________________________  [ ] EOB folder  [ ] PP Contraception plan: If tubal,consent date:     Thrombocytopenia (H) 08/16/2021 8/16/21: plts 103, needs weekly CBC. Consider anesthesia consult if less than 100.     Vitamin D deficiency 02/11/2021    21- pt was prescribed 2,000 at intake. Notify at next appt she should take 4,000 daily.        - Genetic/Infection questionnaire completed, no risks. Discussed genetic screening options, patient does not desire first trimester genetic screening  Pt  does not have a recent known exposure to Parvo or CMV so IgG/IgM testing WILL NOT be ordered. UNABLE TO ASK BECAUSE THE  COULD NOT INTERPRET THIS  Flu Vaccine: Will think about this  COVID Vaccine: Received initial two dose series  - Discussed expectations for routine prenatal care and  scheduling.  -Discussed highlights from The Expectant Family booklet on warning signs, safe pregnancy and prevention of infections diseases, nutrition and exercise.  - Patient was encouraged to start prenatal vitamins as tolerated, if experiencing nausea and vomiting then OK to switch to folic acid only at this time.    -Reconciled and reviewed problem list  -Pt scheduled for dating US and NOB on 09/19    Candace Storm RN

## 2023-09-14 NOTE — PATIENT INSTRUCTIONS
Thank you for trusting us with your care!     If you need to contact us for questions about:  Symptoms, Scheduling & Medical Questions; Non-urgent (2-3 day response) Ericmichaelalopez message, Urgent (needing response today) [phone] (if after 3:30pm next day response)   Prescriptions: Please call your Pharmacy   Billing: [facility] [phone] or [facility]:[phone]   Learning About Pregnancy  Your Care Instructions     Your health in the early weeks of your pregnancy is particularly important for your baby's health. Take good care of yourself. Anything you do that harms your body can also harm your baby.  Make sure to go to all of your doctor appointments. Regular checkups will help keep you and your baby healthy.  How can you care for yourself at home?  Diet    Eat a balanced diet. Make sure your diet includes plenty of beans, peas, and leafy green vegetables.     Do not skip meals or go for many hours without eating. If you are nauseated, try to eat a small, healthy snack every 2 to 3 hours.     Do not eat fish that has a high level of mercury, such as shark, swordfish, or mackerel. Do not eat more than one can of tuna each week.     Drink plenty of fluids. If you have kidney, heart, or liver disease and have to limit fluids, talk with your doctor before you increase the amount of fluids you drink.     Cut down on caffeine, such as coffee, tea, and cola.     Do not drink alcohol, such as beer, wine, or hard liquor.     Take a multivitamin that contains at least 400 micrograms (mcg) of folic acid to help prevent birth defects. Fortified cereal and whole wheat bread are good additional sources of folic acid.     Increase the calcium in your diet. Try to drink a quart of skim milk each day. You may also take calcium supplements and choose foods such as cheese and yogurt.   Lifestyle    Make sure you go to your follow-up appointments.     Get plenty of rest. You may be unusually tired while you are pregnant.     Get  at least 30 minutes of exercise on most days of the week. Walking is a good choice. If you have not exercised in the past, start out slowly. Take several short walks each day.     Do not smoke. If you need help quitting, talk to your doctor about stop-smoking programs. These can increase your chances of quitting for good.     Do not touch cat feces or litter boxes. Also, wash your hands after you handle raw meat, and fully cook all meat before you eat it. Wear gloves when you work in the yard or garden, and wash your hands well when you are done. Cat feces, raw or undercooked meat, and contaminated dirt can cause an infection that may harm your baby or lead to a miscarriage.     Avoid things that can make your body too hot and may be harmful to your baby, such as a hot tub or sauna. Or talk with your doctor before doing anything that raises your body temperature. Your doctor can tell you if it's safe.     Avoid chemical fumes, paint fumes, or poisons.     Do not use illegal drugs, marijuana, or alcohol.   Medicines    Review all of your medicines with your doctor. Some of your routine medicines may need to be changed to protect your baby.     Use acetaminophen (Tylenol) to relieve minor problems, such as a mild headache or backache or a mild fever with cold symptoms. Do not use nonsteroidal anti-inflammatory drugs (NSAIDs), such as ibuprofen (Advil, Motrin) or naproxen (Aleve), unless your doctor says it is okay.     Do not take two or more pain medicines at the same time unless the doctor told you to. Many pain medicines have acetaminophen, which is Tylenol. Too much acetaminophen (Tylenol) can be harmful.     Take your medicines exactly as prescribed. Call your doctor if you think you are having a problem with your medicine.   To manage morning sickness    If you feel sick when you first wake up, try eating a small snack (such as crackers) before you get out of bed. Allow some time to digest the snack, and then  "get out of bed slowly.     Do not skip meals or go for long periods without eating. An empty stomach can make nausea worse.     Eat small, frequent meals instead of three large meals each day.     Drink plenty of fluids.     Eat foods that are high in protein but low in fat.     If you are taking iron supplements, ask your doctor if they are necessary. Iron can make nausea worse.     Avoid any smells, such as coffee, that make you feel sick.     Get lots of rest. Morning sickness may be worse when you are tired.   Follow-up care is a key part of your treatment and safety. Be sure to make and go to all appointments, and call your doctor if you are having problems. It's also a good idea to know your test results and keep a list of the medicines you take.  Where can you learn more?  Go to https://www.GÃ¼dpod.net/patiented  Enter E868 in the search box to learn more about \"Learning About Pregnancy.\"  Current as of: November 9, 2022               Content Version: 13.7    3743-1803 [employer].   Care instructions adapted under license by your healthcare professional. If you have questions about a medical condition or this instruction, always ask your healthcare professional. [employer] disclaims any warranty or liability for your use of this information.      Weeks 6 to 10 of Your Pregnancy: Care Instructions  During these weeks of pregnancy, your body goes through many changes. You may start to feel different, both in your body and your emotions. Each pregnancy is different, so there's no \"right\" way to feel. These early weeks are a time to make healthy choices for you and your pregnancy.    Take a daily prenatal vitamin. Choose one with folic acid in it.   Avoid alcohol, tobacco, and drugs (including marijuana). If you need help quitting, talk to your doctor.     Drink plenty of liquids.  Be sure to drink enough water. And limit sodas, other sweetened drinks, and caffeine.     Choose foods " "that are good sources of calcium, iron, and folate.  You can try dairy products, dark leafy greens, fortified orange juice and cereals, almonds, broccoli, dried fruit, and beans.     Avoid foods that may be harmful.  Don't eat raw meat, deli meat, raw seafood, or raw eggs. Avoid soft cheese and unpasteurized dairy, like Brie and blue cheese. And don't eat fish that contains a lot of mercury, like shark and swordfish.     Don't touch pauline litter or cat poop.  They can cause an infection that could be harmful during pregnancy.     Avoid things that can make your body too hot.  For example, avoid hot tubs and saunas.     Soothe morning sickness.  Try eating 5 or 6 small meals a day, getting some fresh air, or using cj to control symptoms.     Ask your doctor about flu and COVID-19 shots.  Getting them can help protect against infection.   Follow-up care is a key part of your treatment and safety. Be sure to make and go to all appointments, and call your doctor if you are having problems. It's also a good idea to know your test results and keep a list of the medicines you take.  Where can you learn more?  Go to https://www.Comeks.net/patiented  Enter G112 in the search box to learn more about \"Weeks 6 to 10 of Your Pregnancy: Care Instructions.\"  Current as of: November 9, 2022               Content Version: 13.7    5687-1059 [employer].   Care instructions adapted under license by your healthcare professional. If you have questions about a medical condition or this instruction, always ask your healthcare professional. [employer] disclaims any warranty or liability for your use of this information.         Managing Morning Sickness (01:55)  Your health professional recommends that you watch this short online health video.  Learn tips for dealing with morning sickness, no matter what time of day you have it.  Purpose:  Gives tips for managing morning sickness, including eating small " low-fat meals and avoiding caffeine and spicy food.  Goal:  The user will learn tips for dealing with morning sickness during pregnancy.     How to watch the video    Scan the QR code   OR Visit the website    [email or web address].[email or web address]   Current as of: November 9, 2022               Content Version: 13.7    3615-4615 [employer].   Care instructions adapted under license by your healthcare professional. If you have questions about a medical condition or this instruction, always ask your healthcare professional. [employer] disclaims any warranty or liability for your use of this information.      Pregnancy and Heartburn: Care Instructions  Overview     Heartburn is a common problem during pregnancy.  Heartburn happens when stomach acid backs up into the tube that carries food to the stomach. This tube is called the esophagus. Early in pregnancy, heartburn is caused by hormone changes that slow down digestion. Later on, it's also caused by the large uterus pushing up on the stomach.  Even though you can't fix the cause, there are things you can do to get relief. Treating heartburn during pregnancy focuses first on making lifestyle changes, like changing what and how you eat, and on taking medicines.  Heartburn usually improves or goes away after childbirth.  Follow-up care is a key part of your treatment and safety. Be sure to make and go to all appointments, and call your doctor if you are having problems. It's also a good idea to know your test results and keep a list of the medicines you take.  How can you care for yourself at home?  Eat small, frequent meals.  Avoid foods that make your symptoms worse, such as chocolate, peppermint, and spicy foods. Avoid drinks with caffeine, such as coffee, tea, and sodas.  Avoid bending over or lying down after meals.  Take a short walk after you eat.  If heartburn is a problem at night, do not eat for 2 hours before bedtime.  Take antacids like  "Mylanta, Maalox, Rolaids, or Tums. Do not take antacids that have sodium bicarbonate, magnesium trisilicate, or aspirin. Be careful when you take over-the-counter antacid medicines. Many of these medicines have aspirin in them. While you are pregnant, do not take aspirin or medicines that contain aspirin unless your doctor says it is okay.  If you're not getting relief, talk to your doctor. You may be able to take a stronger acid-reducing medicine.  When should you call for help?   Call your doctor now or seek immediate medical care if:    You have new or worse belly pain.     You are vomiting.   Watch closely for changes in your health, and be sure to contact your doctor if:    You have new or worse symptoms of reflux.     You are losing weight.     You have trouble or pain swallowing.     You do not get better as expected.   Where can you learn more?  Go to https://www.Politapoll.testhub/patiented  Enter U946 in the search box to learn more about \"Pregnancy and Heartburn: Care Instructions.\"  Current as of: November 9, 2022               Content Version: 13.7    3817-4621 [employer].   Care instructions adapted under license by your healthcare professional. If you have questions about a medical condition or this instruction, always ask your healthcare professional. [employer] disclaims any warranty or liability for your use of this information.      Constipation: Care Instructions  Overview     Constipation means that you have a hard time passing stools (bowel movements). People pass stools from 3 times a day to once every 3 days. What is normal for you may be different. Constipation may occur with pain in the rectum and cramping. The pain may get worse when you try to pass stools. Sometimes there are small amounts of bright red blood on toilet paper or the surface of stools. This is because of enlarged veins near the rectum (hemorrhoids).  A few changes in your diet and lifestyle may help " you avoid ongoing constipation. Your doctor may also prescribe medicine to help loosen your stool.  Some medicines can cause constipation. These include pain medicines and antidepressants. Tell your doctor about all the medicines you take. Your doctor may want to make a medicine change to ease your symptoms.  Follow-up care is a key part of your treatment and safety. Be sure to make and go to all appointments, and call your doctor if you are having problems. It's also a good idea to know your test results and keep a list of the medicines you take.  How can you care for yourself at home?  Drink plenty of fluids. If you have kidney, heart, or liver disease and have to limit fluids, talk with your doctor before you increase the amount of fluids you drink.  Include high-fiber foods in your diet each day. These include fruits, vegetables, beans, and whole grains.  Get at least 30 minutes of exercise on most days of the week. Walking is a good choice. You also may want to do other activities, such as running, swimming, cycling, or playing tennis or team sports.  Take a fiber supplement, such as Citrucel or Metamucil, every day. Read and follow all instructions on the label.  Schedule time each day for a bowel movement. A daily routine may help. Take your time having a bowel movement, but don't sit for more than 10 minutes at a time. And don't strain too much.  Support your feet with a small step stool when you sit on the toilet. This helps flex your hips and places your pelvis in a squatting position.  Your doctor may recommend an over-the-counter laxative to relieve your constipation. Examples are Milk of Magnesia and MiraLax. Read and follow all instructions on the label. Do not use laxatives on a long-term basis.  When should you call for help?   Call your doctor now or seek immediate medical care if:    You have new or worse belly pain.     You have new or worse nausea or vomiting.     You have blood in your stools.  "  Watch closely for changes in your health, and be sure to contact your doctor if:    Your constipation is getting worse.     You do not get better as expected.   Where can you learn more?  Go to https://www.[employer] Control.net/patiented  Enter P343 in the search box to learn more about \"Constipation: Care Instructions.\"  Current as of: March 22, 2023               Content Version: 13.7    3360-6496 [employer].   Care instructions adapted under license by your healthcare professional. If you have questions about a medical condition or this instruction, always ask your healthcare professional. [employer] disclaims any warranty or liability for your use of this information.      Learning About High-Iron Foods  What foods are high in iron?     The foods you eat contain nutrients, such as vitamins and minerals. Iron is a nutrient. Your body needs the right amount to stay healthy and work as it should. You can use the list below to help you make choices about which foods to eat.  Here are some foods that contain iron. They have 1 to 2 milligrams of iron per serving.  Fruits  Figs (dried), 5 figs  Vegetables  Asparagus (canned), 6 penny  Zonia, beet, Swiss chard, or turnip greens, 1 cup  Dried peas, cooked,   cup  Seaweed, spirulina (dried),   cup  Spinach, (cooked)   cup or (raw) 1 cup  Grains  Cereals, fortified with iron, 1 cup  Grits (instant, cooked), fortified with iron,   cup  Meats and other protein foods  Beans (kidney, lima, navy, white), canned or cooked,   cup  Beef or lamb, 3 oz  Chicken giblets, 3 oz  Chickpeas (garbanzo beans),   cup  Liver of beef, lamb, or pork, 3 oz  Oysters (cooked), 3 oz  Sardines (canned), 3 oz  Soybeans (boiled),   cup  Tofu (firm),   cup  Work with your doctor to find out how much of this nutrient you need. Depending on your health, you may need more or less of it in your diet.  Where can you learn more?  Go to https://www.[employer] Control.net/patiented  Enter " "R005 in the search box to learn more about \"Learning About High-Iron Foods.\"  Current as of: March 1, 2023               Content Version: 13.7 2006-2023 [employer].   Care instructions adapted under license by your healthcare professional. If you have questions about a medical condition or this instruction, always ask your healthcare professional. [employer] disclaims any warranty or liability for your use of this information.      Rh Antibodies Screening During Pregnancy: About This Test  What is it?     The Rh antibodies screening test is a blood test. It checks your blood for Rh antibodies. If you have Rh-negative blood and have been exposed to Rh-positive blood, your immune system may make antibodies to attack the Rh-positive blood. When a pregnant woman has these antibodies, it is called Rh sensitization.  Why is this test done?  The Rh antibodies screening test is done during pregnancy to find out if your baby is at risk for Rh disease. This can happen if you have Rh-negative blood and your baby has Rh-positive blood. If your Rh-negative blood mixes with Rh-positive blood, your immune system will make antibodies to attack the Rh-positive blood.  During pregnancy, these antibodies could attach to the baby's red blood cells. This can cause your baby to have serious health problems. The results of this test will help your doctor know how to best care for you and your baby during your pregnancy.  How do you prepare for the test?  In general, there's nothing you have to do before this test, unless your doctor tells you to.  How is the test done?  A health professional uses a needle to take a blood sample, usually from the arm.  What happens after the test?  You will probably be able to go home right away. It depends on the reason for the test.  You can go back to your usual activities right away.  Follow-up care is a key part of your treatment and safety. Be sure to make and go to all " "appointments, and call your doctor if you are having problems. It's also a good idea to keep a list of the medicines you take. Ask your doctor when you can expect to have your test results.  Where can you learn more?  Go to https://www.ZapMe.net/patiented  Enter P722 in the search box to learn more about \"Rh Antibodies Screening During Pregnancy: About This Test.\"  Current as of: 2022               Content Version: 13.7    6164-2429 [employer].   Care instructions adapted under license by your healthcare professional. If you have questions about a medical condition or this instruction, always ask your healthcare professional. [employer] disclaims any warranty or liability for your use of this information.      Learning About Preventing Rh Disease  What is Rh disease?     Rh disease can be a serious problem in pregnancy. It happens when substances called antibodies in the mother's blood cause red blood cells in her baby's blood to be destroyed. This can occur when the blood types of a mother and her baby do not match.  All blood has an Rh factor. This is what makes a blood type positive or negative. When you are Rh-negative, your baby may be Rh-negative or Rh-positive. If your baby has Rh-positive blood and it mixes with yours, your body will make antibodies. This is called Rh sensitization.  Most of the time, this is not a problem in a first pregnancy. But in future pregnancies, it could cause Rh disease.  A  with Rh disease has mild anemia and may have jaundice. In severe cases, anemia, jaundice, and swelling can be very dangerous or fatal. Some babies need to be delivered early. Some need special care in the NICU. A very sick baby will need a blood transfusion before or after birth.  Fortunately, Rh sensitization is usually easy to prevent.  That's why it's important to get your Rh status checked in your first trimester. It doesn't cause any warning signs. A " "blood test is the only way to know if you are Rh-sensitive or are at risk for it.  How can you prevent Rh disease?  If you are Rh-negative, your doctor gives you an Rh immune globulin shot (such as RhoGAM). It helps prevent your body from making the antibodies that attack your baby's red blood cells.  Timing is important. You need the shot at certain times during your pregnancy. And you need one anytime there is a chance that your baby's blood might mix with yours. That can happen with certain prenatal tests or when you have pregnancy bleeding, such as:  Right after any pregnancy loss, amniocentesis, or CVS testing.  After turning of a breech baby.  Before and maybe after childbirth. Your doctor gives you a shot around week 28. If your  is Rh-positive, you will have another shot.  Follow-up care is a key part of your treatment and safety. Be sure to make and go to all appointments, and call your doctor if you are having problems. It's also a good idea to know your test results and keep a list of the medicines you take.  Where can you learn more?  Go to https://www.Seed&Spark.net/patiented  Enter W177 in the search box to learn more about \"Learning About Preventing Rh Disease.\"  Current as of: 2022               Content Version: 13.7    5636-9533 [email or web address].   Care instructions adapted under license by your healthcare professional. If you have questions about a medical condition or this instruction, always ask your healthcare professional. [email or web address] disclaims any warranty or liability for your use of this information.      Learning About Rh Immunoglobulin Shots  Introduction     An Rh immunoglobulin shot is given to pregnant women who have Rh-negative blood.  You may have Rh-negative blood, and your baby may have Rh-positive blood. If the two types of blood mix, your body will make antibodies. This is called Rh sensitization. Most of the time, this is not a problem the " "first time you're pregnant. But it could cause problems in future pregnancies.  This shot keeps your body from making the antibodies. You get the shot around 28 weeks of pregnancy. After the birth, your baby's blood is tested. If the blood is Rh positive, you will get another shot. You may also get the shot if you have vaginal bleeding while you are pregnant or if you have a miscarriage. These shots protect future pregnancies.  Women with Rh negative blood will need this shot each time they get pregnant.  Example  Rh immunoglobulin (HypRho-D, MICRhoGAM, and RhoGAM)  Possible side effects  Rare side effects may include:  Some mild pain where you got the shot.  A slight fever.  An allergic reaction.  You may have other side effects not listed here. Check the information that comes with your medicine.  What to know about taking this medicine  You may need more than one shot. You may need the shot again:  After amniocentesis, fetal blood sampling, or chorionic villus sampling tests.  If you have bleeding in your second or third trimester.  After turning of a breech baby.  After an injury to the belly while you are pregnant.  After a miscarriage or an .  Before or right after treatment for an ectopic or a partial molar pregnancy.  Tell your doctor if you have any allergies or have had a bad response to medicines in the past.  If you get this shot within 3 months of getting a live-virus vaccine, the vaccine may not work. Your doctor will tell you if you need more vaccine.  Check with your doctor or pharmacist before you use any other medicines. This includes over-the-counter medicines. Make sure your doctor knows all of the medicines, vitamins, herbs, and supplements you take. Taking some medicines at the same time can cause problems.  Where can you learn more?  Go to https://www.healthwise.net/patiented  Enter V615 in the search box to learn more about \"Learning About Rh Immunoglobulin Shots.\"  Current as of: " November 9, 2022               Content Version: 13.7    9668-9117 [employer].   Care instructions adapted under license by your healthcare professional. If you have questions about a medical condition or this instruction, always ask your healthcare professional. [employer] disclaims any warranty or liability for your use of this information.      Rubella (Prydeinig Measles): Care Instructions  Overview  Rubella, also called Prydeinig measles or 3-day measles, is a disease caused by a virus. It spreads by coughs, sneezes, and close contact. Rubella usually is mild and does not cause long-term problems. But if you are pregnant and get it, you can give the disease to your unborn baby. This can cause serious birth defects.  While you have rubella, you may get a rash and a mild fever, and the lymph glands in your neck may swell. Older children often have a fever, eye pain, a sore throat, and body aches. You can relieve most symptoms with care at home. Avoid being around others, especially pregnant people, until your rash has been gone for at least 4 days. People who have not had this disease before or have not had the vaccine have the greatest chance of getting the virus.  Follow-up care is a key part of your treatment and safety. Be sure to make and go to all appointments, and call your doctor if you are having problems. It's also a good idea to know your test results and keep a list of the medicines you take.  How can you care for yourself at home?  Drink plenty of fluids. If you have kidney, heart, or liver disease and have to limit fluids, talk with your doctor before you increase the amount of fluids you drink.  Get plenty of rest to help your body heal.  Take an over-the-counter pain medicine, such as acetaminophen (Tylenol), ibuprofen (Advil, Motrin), or naproxen (Aleve), to reduce fever and discomfort. Read and follow all instructions on the label. Do not give aspirin to anyone younger than  "20. It has been linked to Reye syndrome, a serious illness.  Do not take two or more pain medicines at the same time unless the doctor told you to. Many pain medicines have acetaminophen, which is Tylenol. Too much acetaminophen (Tylenol) can be harmful.  Try not to scratch the rash. Put cold, wet cloths on the rash to reduce itching.  Do not smoke. Smoking can make your symptoms worse. If you need help quitting, talk to your doctor about stop-smoking programs and medicines. These can increase your chances of quitting for good.  Avoid contact with people who have never had rubella and who have not been immunized.  When should you call for help?   Call your doctor now or seek immediate medical care if:    You have a fever with a stiff neck or a severe headache.     You are sensitive to light or feel very sleepy or confused.   Watch closely for changes in your health, and be sure to contact your doctor if:    You do not get better as expected.   Where can you learn more?  Go to https://www.[employer].net/patiented  Enter B812 in the search box to learn more about \"Rubella (Kyr[place] Measles): Care Instructions.\"  Current as of: October 31, 2022               Content Version: 13.7    4554-1378 [employer].   Care instructions adapted under license by your healthcare professional. If you have questions about a medical condition or this instruction, always ask your healthcare professional. [employer] disclaims any warranty or liability for your use of this information.      Gonorrhea and Chlamydia: About These Tests  What is it?  These tests use a sample of urine or other body fluid to look for the bacteria that cause these sexually transmitted infections (STIs). The fluid sample can come from the cervix, vagina, rectum, throat, or eyes.  Why is this test done?  These tests may be done to:  Find out if symptoms are caused by gonorrhea or chlamydia.  Check people who are at high risk of being " "infected with gonorrhea or chlamydia.  Retest people several months after they have been treated for gonorrhea or chlamydia.  Check for infection in your  if you had a gonorrhea or chlamydia infection at the time of delivery.  How can you prepare for the test?  If you are going to have a urine test, do not urinate for at least 1 hour before the test.  If you think you may have chlamydia or gonorrhea, don't have sexual intercourse until you get your test results. And you may want to have tests for other STIs, such as HIV.  How is the test done?  For a direct sample, a swab is used to collect body fluid from the cervix, vagina, rectum, throat, or eyes. Your doctor may collect the sample. Or you may be given instructions on how to collect your own sample.  For a urine sample, you will collect the urine that comes out when you first start to urinate. Don't wipe the genital area clean before you urinate.  How long does the test take?  The test will take a few minutes.  What happens after the test?  You will be able to go home right away.  You can go back to your usual activities right away.  If you do have an infection, don't have sexual intercourse for 7 days after you start treatment. And your sex partner(s) should also be treated.  Follow-up care is a key part of your treatment and safety. Be sure to make and go to all appointments, and call your doctor if you are having problems. It's also a good idea to keep a list of the medicines you take. Ask your doctor when you can expect to have your test results.  Where can you learn more?  Go to [email or web address]/patiented  Enter K976 in the search box to learn more about \"Gonorrhea and Chlamydia: About These Tests.\"  Current as of: 2022               Content Version: 13.7    6403-0668 [employer].   Care instructions adapted under license by your healthcare professional. If you have questions about a medical condition or this " instruction, always ask your healthcare professional. [employer] disclaims any warranty or liability for your use of this information.      Trichomoniasis: About This Test  What is it?     This test uses a sample of urine or other body fluid to look for the tiny parasite that causes trichomoniasis (also called trich). The fluid sample can come from the vagina, cervix, or urethra. Your doctor may choose to use one or more of many available tests.  Why is it done?  A trich test may be done to:  Find out if symptoms are caused by trich.  Check people who are at high risk for being infected with trich.  Check after treatment to make sure that the infection is gone.  How do you prepare for the test?  If you are going to have a urine test, do not urinate for at least 1 hour before the test.  How is the test done?  For a direct sample, a swab is used to collect body fluid from the cervix, vagina, or urethra. Your doctor may collect the sample. Or you may be given instructions on how to collect your own sample.  For a urine sample, you will collect the urine that comes out when you first start to urinate. Don't wipe the area clean before you urinate.  How long does the test take?  It will take a few minutes to collect a sample.  What happens after the test?  You can go home right away.  You can go back to your usual activities right away.  You may get the test results the same day or several days later. It depends on the test used.  If you do have an infection, don't have sexual intercourse for 7 days after you start treatment. Your sex partner(s) should also be treated.  Follow-up care is a key part of your treatment and safety. Be sure to make and go to all appointments, and call your doctor if you are having problems. Ask your doctor when you can expect to have your test results.  Current as of: August 2, 2022               Content Version: 13.7    6878-8070 [employer].   Care instructions  adapted under license by your healthcare professional. If you have questions about a medical condition or this instruction, always ask your healthcare professional. [employer], [facility] disclaims any warranty or liability for your use of this information.      HIV Testing: Care Instructions  Overview     You can get tested for the human immunodeficiency virus (HIV). This test checks for HIV antibodies and antigens in your blood. If they are found, the test is positive.  If HIV antibodies or antigens are not found, you may need a repeat test to be sure the results are correct. Or your doctor may want to do a different test.  Follow-up care is a key part of your treatment and safety. Be sure to make and go to all appointments, and call your doctor if you are having problems. It's also a good idea to know your test results and keep a list of the medicines you take.  What do the results mean?  Normal result  A normal result means that no HIV antibodies or antigens were found in your blood. And if you had a test that checked for HIV RNA or DNA, none was found. Normal results are called negative.  You may need more testing to be sure the test results are correct.  Uncertain result  Test results don't clearly show whether you have an HIV infection. This is usually called an indeterminate result. This may happen before HIV antibodies or antigens develop. Or it may happen when some other type of antibody or antigen interferes with the results. If this occurs, you will probably have another test right away.  Abnormal result  An abnormal result means that you have HIV antibodies or antigens in your blood. These results are called positive.  A positive test will be confirmed by another type of test. This is because some tests can cause false-positive results. No one is considered HIV-positive until the result is confirmed by a test that shows HIV RNA or DNA in the person's blood.  If your test result is positive, your  "doctor will talk to you about starting treatment.  Where can you learn more?  Go to https://www.[email or web address].net/patiented  Enter T792 in the search box to learn more about \"HIV Testing: Care Instructions.\"  Current as of: October 31, 2022               Content Version: 13.7    5797-6404 [employer].   Care instructions adapted under license by your healthcare professional. If you have questions about a medical condition or this instruction, always ask your healthcare professional. [employer] disclaims any warranty or liability for your use of this information.      Hepatitis C Virus Tests: About These Tests  What are they?     Hepatitis C virus tests are blood tests that check for substances in the blood that show whether you have hepatitis C now or had it in the past. The tests can also tell you what type of hepatitis C you have and how severe the disease is. This can help your doctor with treatment.  If the tests show that you have long-term hepatitis C, you need to take steps to prevent spreading the disease.  Why are these tests done?  You may need these tests if:  You have symptoms of hepatitis.  You may have been exposed to the virus. You are more likely to have been exposed to the virus if you inject drugs or are exposed to body fluids (such as if you are a health care worker).  You've had other tests that show you have liver problems.  You are 18 to 79 years old.  You have an HIV infection.  The tests also are done to help your doctor decide about your treatment and see how well it works.  How do you prepare for the test?  In general, there's nothing you have to do before this test, unless your doctor tells you to.  How is the test done?  A health professional uses a needle to take a blood sample, usually from the arm.  What happens after these tests?  You will probably be able to go home right away.  You can go back to your usual activities right away.  Follow-up care is a key part " "of your treatment and safety. Be sure to make and go to all appointments, and call your doctor if you are having problems. It's also a good idea to keep a list of the medicines you take. Ask your doctor when you can expect to have your test results.  Where can you learn more?  Go to [email or web address]/[email or web address]  Enter W551 in the search box to learn more about \"Hepatitis C Virus Tests: About These Tests.\"  Current as of: October 31, 2022               Content Version: 13.7    7497-5795 [employer].   Care instructions adapted under license by your healthcare professional. If you have questions about a medical condition or this instruction, always ask your healthcare professional. [employer] disclaims any warranty or liability for your use of this information.      Learning About Fetal Ultrasound Results  What is a fetal ultrasound?     Fetal ultrasound is a test that lets your doctor see an image of your baby. Your doctor learns information about your baby from this picture. You may find out, for example, if you are having a boy or a girl. But the main reason you have this test is to get information about your baby's health.  (You may hear your baby called a fetus. This is a common medical term for a baby that's growing in the mother's uterus.)  What kind of information can you learn from this test?  The findings of an ultrasound fall into two categories, normal and abnormal.  Normal  The fetus is the right size for its age.  The placenta is the expected size and does not cover the cervix.  There is enough amniotic fluid in the uterus.  No birth defects can be seen.  Abnormal  The fetus is small or large for its age.  The placenta covers the cervix.  There is too much or too little amniotic fluid in the uterus.  The fetus may have a birth defect.  What does an abnormal result mean?  Abnormal seems to imply that something is wrong with your baby. But what it means is that the test " has shown something the doctor wants to take a closer look at.  And that's what happens next. Your doctor will talk to you about what further test or tests you may need.  What do the results mean?  Some of the things your doctor may see on an abnormal ultrasound include:  Echogenic bowel.  The bowel looks very bright on the screen. This could mean that there's blood in the bowel. Or it could mean that something is blocking the small bowel.  Increased nuchal translucency.  The ultrasound measures the thickness at the back of the baby's neck. An increase in thickness is sometimes an early sign of Down syndrome.  Increased or decreased amniotic fluid.  The doctor will look for a reason for the level of amniotic fluid and will watch the pregnancy closely as it progresses.  Large ventricles.  Ventricles in the brain look larger than they should. Your doctor may take a closer look at the brain.  Renal pyelectasis/hydronephrosis.  The ultrasound measures the fluid around the kidney. If there is more fluid than expected, there is a chance of urinary tract or kidney problems.  Short long bones.  The ultrasound measures certain arm and leg bones. A long bone (humerus or femur) that is shorter than average could be a sign of Down syndrome.  Subchorionic hemorrhage.  An ultrasound can show bleeding under one of the membranes that surrounds the fetus. Some women don't have symptoms of bleeding. The ultrasound can find this problem when women are not bleeding from their vagina. Women who have this condition have a slightly higher chance of miscarriage.  What do you do now?  Take a deep breath, and let it out. Keep in mind that an abnormal finding on an ultrasound, after it's coupled with more information, may:  Turn out to be nothing.  Turn out to be something mild that won't affect the baby.  Turn out to be something more serious. But if this happens, early diagnosis helps you and your doctor plan treatment options sooner rather  "than later.  Your medical team is there for you. So are your family and friends. Ask questions, and get the help and support you need.  Follow-up care is a key part of your treatment and safety. Be sure to make and go to all appointments, and call your doctor if you are having problems. It's also a good idea to know your test results and keep a list of the medicines you take.  Where can you learn more?  Go to https://www.[employer].net/patiented  Enter K451 in the search box to learn more about \"Learning About Fetal Ultrasound Results.\"  Current as of: November 9, 2022               Content Version: 13.7    5106-7199 [employer].   Care instructions adapted under license by your healthcare professional. If you have questions about a medical condition or this instruction, always ask your healthcare professional. [employer] disclaims any warranty or liability for your use of this information.      Learning About Prenatal Visits  Your Care Instructions     Regular prenatal visits are very important during any pregnancy. These quick office visits may seem simple and routine. But they can help you and your baby stay healthy. Your doctor is watching for problems that can only be found by regularly checking you and your baby. The visits also give you and your doctor time to build a good relationship.  Many women have prenatal visits every 4 to 6 weeks until week 28 of pregnancy. Then the visits become more frequent. This is often every 2 to 3 weeks through week 36 of pregnancy. In the final month of pregnancy, you likely will see your doctor every week. You may have a different schedule if you have a medical problem or are a teen.  At different times in your pregnancy, you will have exams and tests. Some are routine. Others are done only when there is a chance of a problem. Everything healthy you do for your body helps your growing baby. Rest when you need it. Eat well, drink plenty of water, and " exercise regularly.  What happens during a prenatal visit?  You will have blood pressure checks, along with urine tests. You also may have blood tests. If you need to go to the bathroom while waiting for the doctor, tell the nurse. He or she will give you a sample cup so your urine can be tested.  You will be weighed and have your belly measured.  Your doctor may listen to your baby's heartbeat with a special stethoscope.  In your second trimester, your doctor will check your blood sugar (glucose tolerance test) for diabetes that can occur during pregnancy. This is gestational diabetes, which can harm your baby.  You will have tests to check for infections that could harm your . These include group B streptococcus and hepatitis B.  Your doctor may do ultrasounds to check for problems. This also checks your baby's position. An ultrasound uses sound waves to produce a picture of your baby.  You may have other tests at any time during your pregnancy.  Use your visits to discuss with your doctor any concerns you have.  How can you care for yourself at home?  Get plenty of rest.  Exercise every day, if your doctor says it is okay. If you have not exercised in the past, start out slowly. Take many short walks each day.  Eat a balanced diet. Make sure your diet includes plenty of beans, peas, and leafy green vegetables.  Drink plenty of fluids. Cut down on drinks with caffeine, such as coffee, tea, and cola. If you have kidney, heart, or liver disease and have to limit fluids, talk with your doctor before you increase the amount of fluids you drink.  Avoid chemical fumes, paint fumes, and poisons. Do not use alcohol, marijuana, or illegal drugs. Do not smoke, vape, or use tobacco. If you need help quitting, talk to your doctor about stop-smoking programs and medicines. These can increase your chances of quitting for good.  Review all of your medicines with your doctor. Some of your routine medicines may need to be  "changed to protect your baby. Do not stop or start taking any medicines without talking to your doctor first.  Follow-up care is a key part of your treatment and safety. Be sure to make and go to all appointments, and call your doctor if you are having problems. It's also a good idea to know your test results and keep a list of the medicines you take.  Where can you learn more?  Go to https://www.[email or web address].net/patiented  Enter J502 in the search box to learn more about \"Learning About Prenatal Visits.\"  Current as of: November 9, 2022               Content Version: 13.7    8351-3086 [employer].   Care instructions adapted under license by your healthcare professional. If you have questions about a medical condition or this instruction, always ask your healthcare professional. [employer] disclaims any warranty or liability for your use of this information.      Domestic Abuse: Care Instructions  Overview     If you want to save this information but don't think it is safe to take it home, see if a trusted friend can keep it for you. Plan ahead. Know who you can call for help, and memorize the phone number.   Be careful online too. Your online activity may be seen by others. Do not use your personal computer or device to read about this topic. Use a safe computer such as one at work, a friend's house, or a library.    Domestic abuse is different from an argument now and then. It is a pattern of abuse that one person uses to control another person's behavior. It may start with threats and name-calling. Then, it may lead to more serious acts, like pushing and slapping. The abuse also may occur in other areas. For example, the abuser may withhold money or spend a partner's money without their knowledge.  Abuse can cause serious harm. You are more likely to have a long-term health problem from the injuries and stress of living in a violent relationship. People who are sexually abused by their " "partners have more sexually transmitted infections and unwanted pregnancies. Anyone can be abused in relationships. Anyone who is abused also faces emotional pain.  If you are pregnant, abuse can cause problems such as poor weight gain, infections, and bleeding. Abuse during this time may increase your baby's risk of low birth weight, premature birth, and death.  Follow-up care is a key part of your treatment and safety. Be sure to make and go to all appointments, and call your doctor if you are having problems. It's also a good idea to know your test results and keep a list of the medicines you take.  How can you care for yourself at home?  If you do not have a safe place to stay, discuss this with your doctor before you leave.  Have a plan for where to go, how to leave your house, and where to stay in case of an emergency. Do not tell your partner about your plan. Contact:  The National Domestic Violence Hotline toll-free at 1-600.297.6093. They can help you find resources in your area.  Your local police department, hospital, or clinic for information about shelters and safe homes near you.  Talk to a trusted friend or neighbor or a [employer]ai counselor. Do not feel that you have to hide what happened.  Teach your children how to call for help in an emergency.  Be alert to warning signs, such as threats, heavy alcohol use, or drug use. This can help you avoid danger.  If you can, make sure that there are no guns or other weapons in the house.  When should you call for help?   Call 911 anytime you think you may need emergency care. For example, call if:    You or someone else has just been abused.     You think you or someone else is in danger of being abused.   Watch closely for changes in your health, and be sure to contact your doctor if you have any problems.  Where can you learn more?  Go to https://www.healthwise.net/patiented  Enter G282 in the search box to learn more about \"Domestic Abuse: Care " "Instructions.\"  Current as of: February 27, 2023               Content Version: 13.7    7446-4905 [employer]   Care instructions adapted under license by your healthcare professional. If you have questions about a medical condition or this instruction, always ask your healthcare professional. [employer] disclaims any warranty or liability for your use of this information.      Domestic Violence Safety Instructions: Care Instructions  Overview     If you want to save this information but don't think it is safe to take it home, see if a trusted friend can keep it for you. Plan ahead. Know who you can call for help, and memorize the phone number.   Be careful online too. Your online activity may be seen by others. Do not use your personal computer or device to read about this topic. Use a safe computer such as one at work, a friend's house, or a library.    When you are abused by a spouse or partner, you can take actions to protect yourself and your children.  You can increase your safety whether you decide to stay with your spouse or partner or you decide to leave. You can also prepare an action plan and kit ahead of time. This will allow you to leave quickly when you decide to. Remember, you cannot stop your partner's abuse, but you can find help for you and your children. No one deserves to be abused.  Follow-up care is a key part of your treatment and safety. Be sure to make and go to all appointments, and call your doctor if you are having problems. It's also a good idea to know your test results and keep a list of the medicines you take.  How can you care for yourself at home?  Make a plan for your safety   If you decide to stay with your abusive spouse or partner, you can do the following to increase your safety:  Decide what works best to keep you safe in an emergency.  Decide who you can call to help you in an emergency.  Decide if you will call the police if you get hurt again. If " you can, agree on a signal with your children or neighbor to call the police for you if you need help. You can flash lights or hang something out of a window.  Choose a place to go for a short time if you need to leave home. Memorize the address and phone number.  Learn escape routes out of your home in case you need to leave in a hurry. Teach your children different ways to get out of the house quickly if they need to.  Take objects that can be used as weapons (guns, knives, hammers) and hide them or lock them up.  Learn the number of a domestic violence shelter. Talk to the people there about how they can help.  Find out about other community resources that can help you.  Take pictures of bruises or other injuries if you can. You can also take pictures of things your abuser has broken.  Teach your children that violence is never okay. Tell them that they do not deserve to be hurt.  Pack a bag   Prepare a kit with things you will need if you leave the house suddenly. You can try to hide this in your house, or you can leave it with a friend or relative you can trust. You should include the following items in the kit:  A set of keys to your house and car.  Emergency phone numbers and addresses. You might also want to have a map and a small flashlight in case you need to leave in the night.  Money such as cash or checks. You can also ask a trusted friend or family member to hold money for you.  Copies of legal documents such as house and car titles or rent receipts, birth certificates, Social Security card, voter registration, marriage and 's licenses, and your children's health records.  Personal items you would need for a few days, such as clothes, a toothbrush, toothpaste, and any medicines you or your children need.  A favorite toy or book for your child or children.  Diapers and bottles, if you have very young children.  Pictures that show signs of abuse and violence. You may also add pictures of your  "abuser.  If you leave   If you decide to leave, you can take the following steps:  Go to the emergency room at a hospital if you have been hurt.  Ask the police to be with you as you leave. They can protect you as you leave the house.  If you decide to leave secretly, remember that activities can be tracked. Your abuser may still have access to your cell phone, email, and credit cards. It may be possible for these to be traced. Always be aware of your surroundings.  Take the kit you have prepared. If this is an emergency, do not worry about gathering up anything. Just leave--your safety is most important.  If your abuser moves out, change the locks on the doors. If you have a security system, change the access code.  When should you call for help?   Call 911 anytime you think you may need emergency care. For example, call if:    You or someone else has just been abused.     You think you or someone else is in danger of being abused.   Watch closely for changes in your health, and be sure to contact your doctor if you have any problems.  Where can you learn more?  Go to https://www.[employer].net/patiented  Enter A752 in the search box to learn more about \"Domestic Violence Safety Instructions: Care Instructions.\"  Current as of: October 20, 2022               Content Version: 13.7    8427-5883 [employer].   Care instructions adapted under license by your healthcare professional. If you have questions about a medical condition or this instruction, always ask your healthcare professional. [employer] disclaims any warranty or liability for your use of this information.      Learning About Domestic Abuse  What is domestic abuse?  Domestic abuse is threats or violent behavior in a personal relationship. It can happen between past or current partners or spouses. It's also called domestic violence or intimate partner violence.  Domestic abuse can affect people of any ethnic group, race, or " [employer]. It can affect teens, adults, or the elderly. And it can happen to people of any sexual identity or social status. But most abuse victims are women.  Abusers use fear, bullying, and threats to control their partners. They control what their partners do, where they go, or who they see. They may act jealous, controlling, or possessive. These early signs of abuse may happen soon after the start of the relationship. Sometimes it can be hard to notice abuse at first. But after the relationship becomes more serious, the abuse may get worse.  If you are being abused in your relationship, it's important to get help. The abuse is not your fault, and you don't have to face it alone.  Be careful  It may not be safe to take home domestic abuse information like this handout. Some people ask a trusted friend to keep it for them. It's also important to plan ahead and to memorize the phone number of places you can go for help. If you are concerned about your safety, do not use your computer, smartphone, or tablet to read about domestic abuse.   What are the types of domestic abuse?  Abuse can be emotional, physical, or sexual.  Emotional abuse  Emotional abuse is a pattern of threats, insults, or controlling behavior. It includes verbal abuse. It goes beyond healthy disagreements in a relationship. It's a sign of an unhealthy relationship, and it may be against the law.  Do you feel threatened, intimidated, or controlled? Does your partner threaten your children, other family members, or pets?  Does your partner:  Use jokes meant to embarrass or shame you?  Call you names?  Tell you that you are a bad parent or threaten to take away your children?  Threaten to have you or your family members deported?  Control your access to money or other basic needs?  Control what you do, who you see or talk to, or where you go?  Another form of emotional abuse is denying that it is happening. Or the abuser may act like the abuse is no  big deal or is your fault.  Sexual abuse  With sexual abuse, abusers may try to convince or force you to have sex. They may force you into sex acts you're not comfortable with. Or they may sexually assault you. Sexual abuse can happen even if you are in a committed relationship.  Physical abuse  Physical abuse means that a partner hits, kicks, or physically hurts you. Physical abuse that starts with a slap might lead to kicking, shoving, and choking over time. The abuser may also threaten to hurt or kill you.  What problems can domestic abuse lead to?  Domestic abuse can be very dangerous. It can cause serious, repeated injury. It can even lead to death.  All forms of abuse can cause long-term health problems from the stress of a violent relationship. Verbal abuse can lead to sexual and physical abuse.  Abuse causes emotional pain, depression, anxiety, and post-traumatic stress. Sexual abuse can lead to sexually transmitted infections (including HIV/AIDS) and unplanned pregnancy.  Pregnancy can be a very dangerous time for people in abusive relationships. Pregnant people who are abused may have anemia, infections, bleeding, or poor weight gain. Abuse during this time may also increase your baby's risk of low birth weight, premature birth, and death.  It can be hard for some victims of domestic abuse to ask for help or to leave their relationship. You may feel scared, stuck, or not sure what steps to take. But it's important not to ignore abuse. Talking to someone could be the first step to ending the abuse and taking care of your own health and happiness again.  Where can you get help?  Talk to a trusted friend. Find a local advocacy group, or talk to your doctor about the abuse.  Contact the National Domestic Violence Hotline at 5-357-419-SAFE (1-147.104.8680) for more safety tips. They can guide you to groups in your area that can help. Or go to the National Coalition Against Domestic Violence website at  "www.[employer].org to learn more.  Domestic violence groups or a counselor in your area can help you make a safety plan for yourself and your children.  When to call for help  Call 911 anytime you think you may need emergency care. For example, call if:  You think that you or someone you know is in danger of being abused.  You have been hurt and can't have someone safely take you to emergency care.  You have just been abused.  A family member has just been abused.  Where can you learn more?  Go to https://www.[employer].net/patiented  Enter S665 in the search box to learn more about \"Learning About Domestic Abuse.\"  Current as of: February 27, 2023               Content Version: 13.7    4655-9625 [employer].   Care instructions adapted under license by your healthcare professional. If you have questions about a medical condition or this instruction, always ask your healthcare professional. [employer] disclaims any warranty or liability for your use of this information.      Vaginal Bleeding During Pregnancy: Care Instructions  Overview     It's common to have some vaginal spotting when you are pregnant. In some cases, the bleeding isn't serious. And there aren't any more problems with the pregnancy.  But sometimes bleeding is a sign of a more serious problem. This is more common if the bleeding is heavy or painful. Examples of more serious problems include miscarriage, an ectopic pregnancy, and a problem with the placenta.  You may have to see your doctor again to be sure everything is okay. You may also need more tests to find the cause of the bleeding.  Home treatment may be all you need. But it depends on what is causing the bleeding. Be sure to tell your doctor if you have any new symptoms or if your symptoms get worse.  The doctor has checked you carefully, but problems can develop later. If you notice any problems or new symptoms, get medical treatment right away.  Follow-up care is " a key part of your treatment and safety. Be sure to make and go to all appointments, and call your doctor if you are having problems. It's also a good idea to know your test results and keep a list of the medicines you take.  How can you care for yourself at home?  If your doctor prescribed medicines, take them exactly as directed. Call your doctor if you think you are having a problem with your medicine.  Do not have vaginal sex until your doctor says it's okay.  Do not put anything in your vagina until your doctor says it's okay.  Ask your doctor about other activities you can or can't do.  Get a lot of rest. Being pregnant can make you tired.  Do not use nonsteroidal anti-inflammatory drugs (NSAIDs), such as ibuprofen (Advil, Motrin), naproxen (Aleve), or aspirin, unless your doctor says it is okay.  When should you call for help?   Call 911 anytime you think you may need emergency care. For example, call if:    You passed out (lost consciousness).     You have severe vaginal bleeding. This means you are soaking through a pad each hour for 2 or more hours.     You have sudden, severe pain in your belly or pelvis.   Call your doctor now or seek immediate medical care if:    You have new or worse vaginal bleeding.     You are dizzy or lightheaded, or you feel like you may faint.     You have pain in your belly, pelvis, or lower back.     You think that you are in labor.     You have a sudden release of fluid from your vagina.     You've been having regular contractions for an hour. This means that you've had at least 8 contractions within 1 hour or at least 4 contractions within 20 minutes, even after you change your position and drink fluids.     You notice that your baby has stopped moving or is moving much less than normal.   Watch closely for changes in your health, and be sure to contact your doctor if you have any problems.  Where can you learn more?  Go to https://www.healthwise.net/patiented  Enter N829 in  "the search box to learn more about \"Vaginal Bleeding During Pregnancy: Care Instructions.\"  Current as of: November 9, 2022               Content Version: 13.7    6360-0231 [employer].   Care instructions adapted under license by your healthcare professional. If you have questions about a medical condition or this instruction, always ask your healthcare professional. [employer] disclaims any warranty or liability for your use of this information.      "

## 2023-09-14 NOTE — PROGRESS NOTES
[facility] RN Prenatal Intake note  Subjective     28 year old female newly pregnant.  LMP: 2023.    exact and regular cycles    Pregnancy is planned.    Partner/support person name and relationship -  Janett.        Symptoms since LMP include nausea, vomiting, breast tenderness, fatigue, and weight loss. Patient has tried these relief measures: diet modification, small frequent meals, increased rest, and increased fluids. Does not want to try medications yet but plans to talk about this during her appointment on .     OB HISTORY  OB History    Para Term  AB Living   5 4 4 0 0 4   SAB IAB Ectopic Multiple Live Births   0 0 0 0 4      # Outcome Date GA Lbr Shashi/2nd Weight Sex Delivery Anes PTL Lv   5 Current            4 Term 21 39w5d 05::51 3.67 kg (8 lb 1.5 oz) M Vag-Spont EPI, IV N SIMONA      Complications: Dysfunctional Labor      Name: LISSETTE HERRERA-RADHA      Apgar1: 8  Apgar5: 9   3 Term 12/04/15 40w0d  2.722 kg (6 lb) F  EPI  SIMONA   2 Term 10/22/13 40w0d  2.722 kg (6 lb) F  None  SIMONA   1 Term 12 40w0d  2.892 kg (6 lb 6 oz) M  EPI  SIMONA      Obstetric Comments   No HTN, GDM, PPD, or PPH. Anemia in pregnancy. First in [place] and 2nd and 3th in [address] with [facility]         OB COMPLICATIONS  First Pregnancy No  GDM No Mother has diabetes.   HTNNo  Preeclampsia No   labor No   birth No   birth No  PP hemorrhage No  Retained placenta No  PP mood disorder No  Fetal anomaly No  FGR No  Macrosomia  No  3rd or 4th degree laceration  No  Shoulder dystocia No  NICU admit No      PERSONAL/SOCIAL HISTORY  *updated all tabs in history    lives with their family.  Employment: Unemployed.  Her partner is in [place].   MENTAL HEALTH HISTORY:  N/A      - Current Medications    Current Outpatient Medications   Medication Sig Dispense Refill    Prenatal Vit-Fe Fumarate-FA (PRENATAL MULTIVITAMIN W/IRON) 27-0.8 MG tablet Take 1 tablet by mouth  daily (Patient not taking: Reported on 9/14/2023) 90 tablet 3           - Co-morbids    Past Medical History:   Diagnosis Date    Anemia 02/10/2021    8/4/21: Has started IV iron transfusion  8/6/21: hgb 11.7    Family history of diabetes mellitus in mother 05/05/2021    NEeds early GCT--pt returned to care at 22 wks--too late for early GCT, will come in a few weeks for visit andGCT  EFW at 20wk--93%    Labor and delivery, indication for care 09/04/2021    Normal pregnancy in first trimester 02/16/2021    [facility] CNM pt  Partner's name: Janett   [X] Entered on Epic list  [X] NOB folder  [X] Dating LMP c/w 10 week US  [X] First tri screen declined  [ ] QS/AFP ordered declined  [X] Fetal anatomy US ordered  [x ] Rubella immune  [x ] Hep B immune     [NA ] Started ASA   [x ] NO  plan utox in labor   _____________________________________  [ ] EOB folder  [ ] PP Contraception plan: If tubal,consent date:     Thrombocytopenia (H) 08/16/2021 8/16/21: plts 103, needs weekly CBC. Consider anesthesia consult if less than 100.     Vitamin D deficiency 02/11/2021    21- pt was prescribed 2,000 at intake. Notify at next appt she should take 4,000 daily.        - Genetic/Infection questionnaire completed, no risks. Discussed genetic screening options, patient does not desire first trimester genetic screening  Pt  does not have a recent known exposure to Parvo or CMV so IgG/IgM testing WILL NOT be ordered. UNABLE TO ASK BECAUSE THE  COULD NOT INTERPRET THIS  Flu Vaccine: Will think about this  COVID Vaccine: Received initial two dose series  - Discussed expectations for routine prenatal care and scheduling.  -Discussed highlights from The Expectant Family booklet on warning signs, safe pregnancy and prevention of infections diseases, nutrition and exercise.  - Patient was encouraged to start prenatal vitamins as tolerated, if experiencing nausea and vomiting then OK to switch to folic acid only at this time.     -Reconciled and reviewed problem list  -Pt scheduled for dating US and NOB on 09/19    Candace Storm RN

## 2023-09-19 ENCOUNTER — ANCILLARY PROCEDURE (OUTPATIENT)
Dept: ULTRASOUND IMAGING | Facility: CLINIC | Age: 28
End: 2023-09-19
Attending: ADVANCED PRACTICE MIDWIFE
Payer: COMMERCIAL

## 2023-09-19 DIAGNOSIS — Z32.01 PREGNANCY TEST POSITIVE: ICD-10-CM

## 2023-09-19 LAB
ABO/RH(D): NORMAL
ANTIBODY SCREEN: NEGATIVE
SPECIMEN EXPIRATION DATE: NORMAL

## 2023-09-19 PROCEDURE — 76801 OB US < 14 WKS SINGLE FETUS: CPT

## 2023-09-19 PROCEDURE — 76802 OB US < 14 WKS ADDL FETUS: CPT

## 2023-09-19 PROCEDURE — 76802 OB US < 14 WKS ADDL FETUS: CPT | Mod: 26 | Performed by: STUDENT IN AN ORGANIZED HEALTH CARE EDUCATION/TRAINING PROGRAM

## 2023-09-19 PROCEDURE — 76801 OB US < 14 WKS SINGLE FETUS: CPT | Mod: 26 | Performed by: STUDENT IN AN ORGANIZED HEALTH CARE EDUCATION/TRAINING PROGRAM

## 2023-09-20 ENCOUNTER — TRANSCRIBE ORDERS (OUTPATIENT)
Dept: MATERNAL FETAL MEDICINE | Facility: CLINIC | Age: 28
End: 2023-09-20

## 2023-09-20 ENCOUNTER — PRENATAL OFFICE VISIT (OUTPATIENT)
Dept: OBGYN | Facility: CLINIC | Age: 28
End: 2023-09-20
Attending: REGISTERED NURSE
Payer: COMMERCIAL

## 2023-09-20 ENCOUNTER — LAB (OUTPATIENT)
Dept: LAB | Facility: CLINIC | Age: 28
End: 2023-09-20
Attending: REGISTERED NURSE
Payer: COMMERCIAL

## 2023-09-20 VITALS
DIASTOLIC BLOOD PRESSURE: 75 MMHG | SYSTOLIC BLOOD PRESSURE: 103 MMHG | BODY MASS INDEX: 23.87 KG/M2 | WEIGHT: 139.8 LBS | HEART RATE: 86 BPM | HEIGHT: 64 IN

## 2023-09-20 DIAGNOSIS — Z83.3 FAMILY HISTORY OF DIABETES MELLITUS IN MOTHER: ICD-10-CM

## 2023-09-20 DIAGNOSIS — O30.041 DICHORIONIC DIAMNIOTIC TWIN PREGNANCY IN FIRST TRIMESTER: ICD-10-CM

## 2023-09-20 DIAGNOSIS — O30.041 DICHORIONIC DIAMNIOTIC TWIN PREGNANCY IN FIRST TRIMESTER: Primary | ICD-10-CM

## 2023-09-20 DIAGNOSIS — O26.90 PREGNANCY RELATED CONDITION, ANTEPARTUM: Primary | ICD-10-CM

## 2023-09-20 PROBLEM — Z34.91 NORMAL PREGNANCY IN FIRST TRIMESTER: Status: RESOLVED | Noted: 2021-02-16 | Resolved: 2023-09-20

## 2023-09-20 PROBLEM — E55.9 VITAMIN D DEFICIENCY: Status: RESOLVED | Noted: 2021-02-11 | Resolved: 2023-09-20

## 2023-09-20 PROBLEM — D64.9 ANEMIA: Status: RESOLVED | Noted: 2021-02-10 | Resolved: 2023-09-20

## 2023-09-20 PROBLEM — D69.6 THROMBOCYTOPENIA (H): Status: RESOLVED | Noted: 2021-08-16 | Resolved: 2023-09-20

## 2023-09-20 LAB
ALBUMIN SERPL BCG-MCNC: 4.1 G/DL (ref 3.5–5.2)
ALP SERPL-CCNC: 81 U/L (ref 35–104)
ALT SERPL W P-5'-P-CCNC: 16 U/L (ref 0–50)
ANION GAP SERPL CALCULATED.3IONS-SCNC: 15 MMOL/L (ref 7–15)
AST SERPL W P-5'-P-CCNC: 21 U/L (ref 0–45)
BILIRUB SERPL-MCNC: 0.3 MG/DL
BUN SERPL-MCNC: 3.1 MG/DL (ref 6–20)
CALCIUM SERPL-MCNC: 9.2 MG/DL (ref 8.6–10)
CHLORIDE SERPL-SCNC: 102 MMOL/L (ref 98–107)
CREAT SERPL-MCNC: 0.32 MG/DL (ref 0.51–0.95)
DEPRECATED HCO3 PLAS-SCNC: 17 MMOL/L (ref 22–29)
EGFRCR SERPLBLD CKD-EPI 2021: >90 ML/MIN/1.73M2
ERYTHROCYTE [DISTWIDTH] IN BLOOD BY AUTOMATED COUNT: 13.4 % (ref 10–15)
GLUCOSE 1H P 50 G GLC PO SERPL-MCNC: 106 MG/DL (ref 70–129)
GLUCOSE SERPL-MCNC: 106 MG/DL (ref 70–99)
HBA1C MFR BLD: 5.2 %
HCT VFR BLD AUTO: 35.4 % (ref 35–47)
HGB BLD-MCNC: 12.3 G/DL (ref 11.7–15.7)
MCH RBC QN AUTO: 31.3 PG (ref 26.5–33)
MCHC RBC AUTO-ENTMCNC: 34.7 G/DL (ref 31.5–36.5)
MCV RBC AUTO: 90 FL (ref 78–100)
PLATELET # BLD AUTO: 165 10E3/UL (ref 150–450)
POTASSIUM SERPL-SCNC: 3.6 MMOL/L (ref 3.4–5.3)
PROT SERPL-MCNC: 7.3 G/DL (ref 6.4–8.3)
RBC # BLD AUTO: 3.93 10E6/UL (ref 3.8–5.2)
SODIUM SERPL-SCNC: 134 MMOL/L (ref 136–145)
T PALLIDUM AB SER QL: NONREACTIVE
WBC # BLD AUTO: 5.1 10E3/UL (ref 4–11)

## 2023-09-20 PROCEDURE — G0463 HOSPITAL OUTPT CLINIC VISIT: HCPCS | Performed by: REGISTERED NURSE

## 2023-09-20 PROCEDURE — 80053 COMPREHEN METABOLIC PANEL: CPT

## 2023-09-20 PROCEDURE — 86901 BLOOD TYPING SEROLOGIC RH(D): CPT

## 2023-09-20 PROCEDURE — 86706 HEP B SURFACE ANTIBODY: CPT

## 2023-09-20 PROCEDURE — 87340 HEPATITIS B SURFACE AG IA: CPT

## 2023-09-20 PROCEDURE — 36415 COLL VENOUS BLD VENIPUNCTURE: CPT

## 2023-09-20 PROCEDURE — 86787 VARICELLA-ZOSTER ANTIBODY: CPT

## 2023-09-20 PROCEDURE — 86762 RUBELLA ANTIBODY: CPT

## 2023-09-20 PROCEDURE — 86850 RBC ANTIBODY SCREEN: CPT

## 2023-09-20 PROCEDURE — 82950 GLUCOSE TEST: CPT

## 2023-09-20 PROCEDURE — 99207 PR PRENATAL VISIT: CPT | Performed by: REGISTERED NURSE

## 2023-09-20 PROCEDURE — 86803 HEPATITIS C AB TEST: CPT

## 2023-09-20 PROCEDURE — 83036 HEMOGLOBIN GLYCOSYLATED A1C: CPT

## 2023-09-20 PROCEDURE — 85041 AUTOMATED RBC COUNT: CPT

## 2023-09-20 PROCEDURE — 86780 TREPONEMA PALLIDUM: CPT

## 2023-09-20 PROCEDURE — 82306 VITAMIN D 25 HYDROXY: CPT

## 2023-09-20 PROCEDURE — 87389 HIV-1 AG W/HIV-1&-2 AB AG IA: CPT

## 2023-09-20 RX ORDER — ASPIRIN 81 MG/1
81 TABLET, CHEWABLE ORAL DAILY
Qty: 90 TABLET | Refills: 3 | Status: SHIPPED | OUTPATIENT
Start: 2023-09-20 | End: 2024-01-09

## 2023-09-20 RX ORDER — ONDANSETRON 4 MG/1
4 TABLET, ORALLY DISINTEGRATING ORAL EVERY 8 HOURS PRN
Qty: 30 TABLET | Refills: 1 | Status: SHIPPED | OUTPATIENT
Start: 2023-09-20 | End: 2023-12-11

## 2023-09-20 RX ORDER — PRENATAL VIT/IRON FUM/FOLIC AC 27MG-0.8MG
1 TABLET ORAL DAILY
Qty: 90 TABLET | Refills: 3 | Status: SHIPPED | OUTPATIENT
Start: 2023-09-20 | End: 2023-10-17

## 2023-09-20 NOTE — PATIENT INSTRUCTIONS
Thank you for trusting us with your care!     If you need to contact us for questions about:  Symptoms, Scheduling & Medical Questions; Non-urgent (2-3 day response) Jesus message, Urgent (needing response today) [phone] (if after 3:30pm next day response)   Prescriptions: Please call your Pharmacy   Billing: [place] [phone] or [facility]:[phone]

## 2023-09-20 NOTE — PROGRESS NOTES
[facility] Provider New OB Note    Reviewed RN intake note.    Xiao is a 28 year old female who presents to clinic for a new OB visit.   at 10w2d with Estimated Date of Delivery: Apr 15, 2024 based on dating US not consistent with LMP.     Pt has approximate LMP of 23. Pt reports menses have been irregular and has short cycles Q23 days. Will use US dating to establish CARLEY.     Reviewed normal dating US showing di di twin pregnancy.     Feels well. Has not yet started PNV, requests Rx.   She has not had bleeding since her LMP.   She has had nausea resulting in occasional vomiting. Weight loss has not occurred.   This was a planned pregnancy.   Partner is involved,  Janett.      OTHER CONCERNS:   - No hx of pap smear on file, pt reports ~ 2 years ago. Reports no hx of abnormal pap smears. Likely due PP   - Agrees to early 1hr GCT based on family hx of DM today but requests to substitute Glucola with apple juice  - H/o  x4, report no complications with prior pregnanies or births aside from anemia requiring IV Fe infusion.     PSYCHIATRIC:  Denies depression or anxiety.  Denies hx of mood disorders in the past.     PHQ-9 score:         No data to display                     No data to display              Past History:  Her past medical history   Past Medical History:   Diagnosis Date    Anemia 02/10/2021    8/4/21: Has started IV iron transfusion  21: hgb 11.7    Family history of diabetes mellitus in mother 2021    NEeds early GCT--pt returned to care at 22 wks--too late for early GCT, will come in a few weeks for visit andGCT  EFW at 20wk--93%    Labor and delivery, indication for care 2021    Normal pregnancy in first trimester 2021    S CNM pt  Partner's name: Janett   [X] Entered on Epic list  [X] NOB folder  [X] Dating LMP c/w 10 week US  [X] First tri screen declined  [ ] QS/AFP ordered declined  [X] Fetal anatomy US ordered  [x ] Rubella immune  [x ] Hep B immune     [NA ]  Started ASA   [x ] NO  plan utox in labor   _____________________________________  [ ] EOB folder  [ ] PP Contraception plan: If tubal,consent date:     Thrombocytopenia (H) 08/16/2021 8/16/21: plts 103, needs weekly CBC. Consider anesthesia consult if less than 100.     Vitamin D deficiency 02/11/2021    21- pt was prescribed 2,000 at intake. Notify at next appt she should take 4,000 daily.       Her past pregnancies have been uncomplicated  Since her last LMP she denies use of alcohol, tobacco and street drugs.    HISTORY:  Family History   Problem Relation Age of Onset    Diabetes Mother     No Known Problems Father     No Known Problems Sister     No Known Problems Brother     No Known Problems Brother     No Known Problems Brother     No Known Problems Brother     No Known Problems Brother     No Known Problems Sister     No Known Problems Sister     No Known Problems Sister      Social History     Socioeconomic History    Marital status:      Spouse name: Janett    Number of children: 4   Occupational History    Occupation: stay at home   Tobacco Use    Smoking status: Never    Smokeless tobacco: Never   Vaping Use    Vaping Use: Never used   Substance and Sexual Activity    Alcohol use: Not Currently    Drug use: Never    Sexual activity: Yes     Partners: Male     Social Determinants of Health     Food Insecurity: No Food Insecurity (2/10/2021)    Hunger Vital Sign     Worried About Running Out of Food in the Last Year: Never true     Ran Out of Food in the Last Year: Never true     Current Outpatient Medications   Medication Sig    Prenatal Vit-Fe Fumarate-FA (PRENATAL MULTIVITAMIN W/IRON) 27-0.8 MG tablet Take 1 tablet by mouth daily (Patient not taking: Reported on 9/14/2023)     No current facility-administered medications for this visit.     No Known Allergies    ============================================  MEDICAL HISTORY  Past Medical History:   Diagnosis Date    Anemia 02/10/2021     21: Has started IV iron transfusion  21: hgb 11.7    Family history of diabetes mellitus in mother 2021    NEeds early GCT--pt returned to care at 22 wks--too late for early GCT, will come in a few weeks for visit andGCT  EFW at 20wk--93%    Labor and delivery, indication for care 2021    Normal pregnancy in first trimester 2021    [facility] CNM pt  Partner's name: Janett   [X] Entered on Epic list  [X] NOB folder  [X] Dating LMP c/w 10 week US  [X] First tri screen declined  [ ] QS/AFP ordered declined  [X] Fetal anatomy US ordered  [x ] Rubella immune  [x ] Hep B immune     [NA ] Started ASA   [x ] NO  plan utox in labor   _____________________________________  [ ] EOB folder  [ ] PP Contraception plan: If tubal,consent date:     Thrombocytopenia (H) 2021: plts 103, needs weekly CBC. Consider anesthesia consult if less than 100.     Vitamin D deficiency 2021    21- pt was prescribed 2,000 at intake. Notify at next appt she should take 4,000 daily.      Past Surgical History:   Procedure Laterality Date    NO HISTORY OF SURGERY         OB History    Para Term  AB Living   5 4 4 0 0 4   SAB IAB Ectopic Multiple Live Births   0 0 0 0 4      # Outcome Date GA Lbr Shashi/2nd Weight Sex Delivery Anes PTL Lv   5 Current            4 Term 21 39w5d 05:30 / 12:51 3.67 kg (8 lb 1.5 oz) M Vag-Spont EPI, IV N SIMONA      Complications: Dysfunctional Labor      Name: LISSETTE HERRERA-RADHA      Apgar1: 8  Apgar5: 9   3 Term 12/04/15 40w0d  2.722 kg (6 lb) F  EPI  SIMONA   2 Term 10/22/13 40w0d  2.722 kg (6 lb) F  None  SIMONA   1 Term 12 40w0d  2.892 kg (6 lb 6 oz) M  EPI  SIMONA      Obstetric Comments   No HTN, GDM, PPD, or PPH. Anemia in pregnancy. First in [place] and 2nd and 3th in [place], 4th with [facility]       Reviewed genetic history form       GYN History- No hx Abnormal Pap Smears per pt report, believes last pap ~2 years ago, likely due PP.           "               Cervical procedures: none                        History of STI: none    I personally reviewed the past social/family/medical and surgical history on the date of service.     OBJECTIVE:  /75   Pulse 86   Ht 1.62 m (5' 3.78\")   Wt 63.4 kg (139 lb 12.8 oz)   LMP 2023 (Exact Date)   Breastfeeding No   BMI 24.16 kg/m    ROS: 10 point ROS neg other than the symptoms noted above in the HPI.    EXAM:  /75   Pulse 86   Ht 1.62 m (5' 3.78\")   Wt 63.4 kg (139 lb 12.8 oz)   LMP 2023 (Exact Date)   Breastfeeding No   BMI 24.16 kg/m     EXAM:  GENERAL:  Pleasant pregnant female, alert, cooperative and well groomed.  SKIN:  Warm and dry, without lesions or rashes  HEAD: Symmetrical features.  MOUTH:  Buccal mucosa pink, moist without lesions.  Teeth in good repair.    NECK:  Thyroid without enlargement and nodules.  Lymph nodes not palpable.   LUNGS:  Clear to auscultation.  BREAST:  declined exam   HEART:  RRR without murmur.  ABDOMEN: Soft without masses , tenderness or organomegaly.  No CVA tenderness.  Uterus palpable at size equal to dates.  No scars noted..   MUSCULOSKELETAL:  Full range of motion  EXTREMITIES:  No edema. No significant varicosities.   PELVIC EXAM: declined     Recommended Flu Vaccine.  Patient declined, will consider next visit    ASSESSMENT:  28 year old , 10w2d weeks of pregnancy with CARLEY of Apr 15, 2024 by dating US. Not c/w with LMP.   Genetic Screening: Level 2 Ultrasound only    ICD-10-CM    1. Dichorionic diamniotic twin pregnancy in first trimester  O30.041 ABO/Rh type and screen     Rubella Antibody IgG     Hepatitis B Surface Antibody     Hepatitis B surface antigen     Hepatitis C antibody     Vitamin D Deficiency     HIV Antigen Antibody Combo Cascade     CBC with platelets     Urine Culture     Treponema Abs w Reflex to RPR and Titer     Protein  random urine     Creatinine     Comprehensive metabolic panel     Varicella Zoster Virus " Antibody IgG     Hemoglobin A1c     [facility] Fetal [facility] Referral - Pregnancy          Xiao was seen today for prenatal care.    Diagnoses and all orders for this visit:    Dichorionic diamniotic twin pregnancy in first trimester  -     ABO/Rh type and screen; Future  -     Rubella Antibody IgG; Future  -     Hepatitis B Surface Antibody; Future  -     Hepatitis B surface antigen; Future  -     Hepatitis C antibody; Future  -     Vitamin D Deficiency; Future  -     HIV Antigen Antibody Combo Cascade; Future  -     CBC with platelets; Future  -     Urine Culture; Future  -     Treponema Abs w Reflex to RPR and Titer; Future  -     Protein  random urine; Future  -     Creatinine; Future  -     Comprehensive metabolic panel; Future  -     Varicella Zoster Virus Antibody IgG; Future  -     Hemoglobin A1c; Future  -     Mat Fetal [facility] Referral - Pregnancy; Future          Orders Placed This Encounter   Procedures    Rubella Antibody IgG    Hepatitis B Surface Antibody    Hepatitis B surface antigen    Hepatitis C antibody    Vitamin D Deficiency    HIV Antigen Antibody Combo Cascade    CBC with platelets    Treponema Abs w Reflex to RPR and Titer    Protein  random urine    Creatinine    Comprehensive metabolic panel    Varicella Zoster Virus Antibody IgG    Hemoglobin A1c    [facility] Fetal [facility] Referral - Pregnancy    ABO/Rh type and screen        PLAN:  - Reviewed use of triage nurse line and contacting the on-call provider after hours for an urgent need such as fever, vagina bleeding, bladder or vaginal infection, rupture of membranes,  or term labor.    -Ordered routine prenatal lab work.     - Reviewed best evidence for: weight gain for her weight and height for pregnancy:  Based on pre-pregnancy Body mass index is 24.16 kg/m . RECOMMENDED WEIGHT GAIN: 25-35 lbs.  - Reviewed healthy diet and foods to avoid, exercise and activity during pregnancy; avoiding exposure to toxoplasmosis; and maintenance of a  generally healthy lifestyle.   - Discussed the harms, benefits, side effects and alternative therapies for current prescribed and OTC medications. Patient was encouraged to start/continue prenatal vitamins as tolerated.    - Oriented to Practice, types of care, and how to reach a provider.  Pt prefers MD team. Di di twin pregnancy.     - Patient received 1st trimester new OB education packet complete with aide of The Expectant Family booklet including information on genetic screening test options.    - Patient desires level II ultrasound which was ordered.    - Reviewed risk for gestational diabetes d/t First degree relative with GDM or DM , IS agreeable to early 1 hour today.    - Reviewed risk for Pre Eclampsia d/t Current multi fetal gestation or meets two or more of the moderate risk factors including Sociodemographic characteristics (Racism, Less access given lower SES) and IS agreeable to starting 81mg aspirin daily after 12 weeks .    - The patient  does not have a history of spontaneous  birth so  WILL NOT consider serial transvaginal cervical length ultrasounds from 16-24 weeks.     -The patient does not have a history of immunosuppresion or HIV so Toxoplasma IgG/IgM WILL NOT be ordered.    -Assess risk for asymptomatic latent TB (prior infection, recent immigrant from epidemic areas, immunosuppression, living in overcrowded environment):   WILL NOT have PPD skin test or Quantiferon-TB Gold Plus blood draw. *both options valid*  [place] to the [place] at age 14 and reports previously had testing done.     - Offered flu shot, pt declines.   - [facility] referral for recommendation for US in di-di twin pregnancy sent.   - Sent to lab for routine OB labs + Hgb A1c, 1hr GCT, Baseline HELLP labs given high risk factor multifetal gestation.   - Rx sent for zofran, PNV, ASA to start at 12 weeks.     - Continue scheduled prenatal care and prn if questions or concerns    EDY Gusman CNM

## 2023-09-21 LAB
DEPRECATED CALCIDIOL+CALCIFEROL SERPL-MC: 20 UG/L (ref 20–75)
HBV SURFACE AB SERPL IA-ACNC: 52.64 M[IU]/ML
HBV SURFACE AB SERPL IA-ACNC: REACTIVE M[IU]/ML
HBV SURFACE AG SERPL QL IA: NONREACTIVE
HCV AB SERPL QL IA: NONREACTIVE
HIV 1+2 AB+HIV1 P24 AG SERPL QL IA: NONREACTIVE
RUBV IGG SERPL QL IA: 23.5 INDEX
RUBV IGG SERPL QL IA: POSITIVE
VZV IGG SER QL IA: 1880 INDEX
VZV IGG SER QL IA: POSITIVE

## 2023-09-22 PROBLEM — E55.9 VITAMIN D DEFICIENCY: Status: ACTIVE | Noted: 2023-09-22

## 2023-09-26 ENCOUNTER — TELEPHONE (OUTPATIENT)
Dept: OBGYN | Facility: CLINIC | Age: 28
End: 2023-09-26
Payer: COMMERCIAL

## 2023-09-26 DIAGNOSIS — E55.9 VITAMIN D DEFICIENCY: Primary | ICD-10-CM

## 2023-09-26 NOTE — TELEPHONE ENCOUNTER
Reached the patient with the help of a Malaysian  to go over her low Vit D level and the need to start a supplement.    I let Xiao know that her labs were all normal expect her Vit D level. She needs to start taking Vitamin D 4,000-5,000 international unit(s) a day. I can either send it to a pharmacy or she can pick it up over the counter.     Xiao would like it called into the [place] pharmacy in our building. Prescription sent and all questions were answered.

## 2023-10-17 ENCOUNTER — PRENATAL OFFICE VISIT (OUTPATIENT)
Dept: OBGYN | Facility: CLINIC | Age: 28
End: 2023-10-17
Payer: COMMERCIAL

## 2023-10-17 VITALS
DIASTOLIC BLOOD PRESSURE: 69 MMHG | SYSTOLIC BLOOD PRESSURE: 102 MMHG | BODY MASS INDEX: 24.02 KG/M2 | WEIGHT: 139 LBS | HEART RATE: 83 BPM

## 2023-10-17 DIAGNOSIS — O30.042 DICHORIONIC DIAMNIOTIC TWIN PREGNANCY IN SECOND TRIMESTER: Primary | ICD-10-CM

## 2023-10-17 DIAGNOSIS — N89.8 VAGINAL DISCHARGE: ICD-10-CM

## 2023-10-17 LAB
BACTERIAL VAGINOSIS VAG-IMP: NEGATIVE
CANDIDA DNA VAG QL NAA+PROBE: DETECTED
CANDIDA GLABRATA / CANDIDA KRUSEI DNA: NOT DETECTED
T VAGINALIS DNA VAG QL NAA+PROBE: NOT DETECTED

## 2023-10-17 PROCEDURE — 87086 URINE CULTURE/COLONY COUNT: CPT

## 2023-10-17 PROCEDURE — 0352U MULTIPLEX VAGINAL PANEL BY PCR: CPT

## 2023-10-17 PROCEDURE — 87591 N.GONORRHOEAE DNA AMP PROB: CPT

## 2023-10-17 PROCEDURE — 87491 CHLMYD TRACH DNA AMP PROBE: CPT

## 2023-10-17 PROCEDURE — 99207 PR PRENATAL VISIT: CPT | Mod: GE | Performed by: STUDENT IN AN ORGANIZED HEALTH CARE EDUCATION/TRAINING PROGRAM

## 2023-10-17 PROCEDURE — G0463 HOSPITAL OUTPT CLINIC VISIT: HCPCS

## 2023-10-17 RX ORDER — PRENATAL VIT/IRON FUM/FOLIC AC 27MG-0.8MG
1 TABLET ORAL DAILY
Qty: 90 TABLET | Refills: 3 | Status: SHIPPED | OUTPATIENT
Start: 2023-10-17 | End: 2024-01-09

## 2023-10-17 RX ORDER — METOCLOPRAMIDE 5 MG/1
5 TABLET ORAL 3 TIMES DAILY PRN
Qty: 60 TABLET | Refills: 0 | Status: SHIPPED | OUTPATIENT
Start: 2023-10-17 | End: 2024-01-09

## 2023-10-17 RX ORDER — ACETAMINOPHEN 325 MG/1
650 TABLET ORAL EVERY 4 HOURS PRN
Qty: 60 TABLET | Refills: 0 | Status: ON HOLD | OUTPATIENT
Start: 2023-10-17 | End: 2024-03-30

## 2023-10-17 RX ORDER — PYRIDOXINE HCL (VITAMIN B6) 25 MG
25 TABLET ORAL 3 TIMES DAILY PRN
Qty: 60 TABLET | Refills: 1 | Status: SHIPPED | OUTPATIENT
Start: 2023-10-17 | End: 2023-12-11

## 2023-10-17 ASSESSMENT — PAIN SCALES - GENERAL: PAINLEVEL: MILD PAIN (2)

## 2023-10-17 NOTE — PROGRESS NOTES
[facility]  Return OB Visit    S: Has been having intermittent headaches since last night. Was very severe last night, now about 5/10. Gets headaches outside of pregnancy, this feels different. Has not taken any medicine for her headache. Has been noticing some new white vaginal discharge with some itching. No leaking fluid. No vaginal bleeding. Still feeling nauseous, intermittently vomiting. Tolerating po ok. Taking zofran, doesn't think it helps.    Pregnancy notable for  - di/di twins  - vitamin D deficiency    O: /69   Pulse 83   Wt 63 kg (139 lb)   LMP 2023 (Exact Date)   BMI 24.02 kg/m    Weight gain: 0 kg (0 lb)    Gen: Well-appearing, NAD    Portable US: di/di twin pregnancy, grossly normal FHR for both twins    A/P:  Xiao Vivas is a 28 year old  at 14w1d by 10w1d US, here for return OB visit.    Prenatal care  Di/di twin pregnancy  - Rh pos, sary neg, rubella imm, infectious labs within normal limits. Still needs Ucx, GC/CT, collected today  - Taking zofran for N/V. Added unisom/B6 and reglan prn today  - Early GCT wnl, HgbA1c 5.2  - Taking ASA for pre-e prophylaxis    Genetic screening &  diagnosis  - Declined genetic screening  - Level 2 US scheduled 23    Vitamin D deficiency  - Continue vitamin D supplementation    Vaginal discharge  - vaginitis panel, GC/CT today    Headache  - Spontaneously improving. Tylenol prn.    Return to clinic in 4 weeks. Discussed return precautions.    Staffed with Dr. Lilibeth Erickson MD  Ob/Gyn Resident, PGY-3    Patient was seen by the resident in [facility] of Care Clinic.  I discussed the patient with the resident during the patient's visit and the patient's assessment and plan were made jointly.      Aimee Mcelroy MD

## 2023-10-17 NOTE — NURSING NOTE
14 week ob visit headache since last night  only on right side-  still nauseated and vomiting twice a day.

## 2023-10-18 LAB
C TRACH DNA SPEC QL NAA+PROBE: NEGATIVE
N GONORRHOEA DNA SPEC QL NAA+PROBE: NEGATIVE

## 2023-10-19 LAB — BACTERIA UR CULT: NORMAL

## 2023-11-13 ENCOUNTER — PRENATAL OFFICE VISIT (OUTPATIENT)
Dept: OBGYN | Facility: CLINIC | Age: 28
End: 2023-11-13
Attending: OBSTETRICS & GYNECOLOGY
Payer: MEDICAID

## 2023-11-13 VITALS
BODY MASS INDEX: 25.41 KG/M2 | WEIGHT: 147 LBS | DIASTOLIC BLOOD PRESSURE: 63 MMHG | SYSTOLIC BLOOD PRESSURE: 93 MMHG | HEART RATE: 106 BPM

## 2023-11-13 DIAGNOSIS — O09.892 SUPERVISION OF OTHER HIGH RISK PREGNANCIES, SECOND TRIMESTER: Primary | ICD-10-CM

## 2023-11-13 DIAGNOSIS — O30.042 DICHORIONIC DIAMNIOTIC TWIN PREGNANCY IN SECOND TRIMESTER: ICD-10-CM

## 2023-11-13 DIAGNOSIS — Z64.1 GRAND MULTIPARITY: ICD-10-CM

## 2023-11-13 PROCEDURE — 90686 IIV4 VACC NO PRSV 0.5 ML IM: CPT

## 2023-11-13 PROCEDURE — G0008 ADMIN INFLUENZA VIRUS VAC: HCPCS

## 2023-11-13 PROCEDURE — 250N000011 HC RX IP 250 OP 636

## 2023-11-13 PROCEDURE — 99207 PR PRENATAL VISIT: CPT | Mod: GC | Performed by: OBSTETRICS & GYNECOLOGY

## 2023-11-13 PROCEDURE — G0463 HOSPITAL OUTPT CLINIC VISIT: HCPCS | Mod: 25 | Performed by: OBSTETRICS & GYNECOLOGY

## 2023-11-13 NOTE — NURSING NOTE
Chief Complaint   Patient presents with    Prenatal Care     CRYSTAL 18 week    Roshni Hercules LPN

## 2023-11-13 NOTE — PATIENT INSTRUCTIONS

## 2023-11-13 NOTE — PROGRESS NOTES
[facility]  Return OB Visit    Due to language barrier, an  was present during the history-taking and subsequent discussion (and for part of the physical exam) with this patient.     S: Xiao endorses that she is feeling well since she was last seen. Her headaches have improved with the use of tylenol. Endorses no further vaginal discharge, itchiness, or irritation since finishing her medication for her yeast infection. No leaking fluid. No vaginal bleeding. No contractions. Has not yet started to feel fetal movement. Still feeling nauseous and intermittently vomiting, but notes this is improved since she was last seen.     Questions today:  - Risk of twin pregnancies?  - Delivery route of twins?    Pregnancy notable for  - Di/Di twins  - vitamin D deficiency    O: BP 93/63   Pulse 106   Wt 66.7 kg (147 lb)   LMP 2023 (Exact Date)   BMI 25.41 kg/m    Weight gain: 3.629 kg (8 lb)    Gen: Well-appearing, NAD  CV:       Regular rate. Well perfused.   Pulm:    Non-labored breathing on room air.  Ext:       Warm. Well perfused.  Neuro:  Grossly intact. Moves all four extremities spontaneously     A/P:  Xiao Vivas is a 28 year old  at 18w0d by 10w1d US, here for return OB visit.    Prenatal care  Di/di twin pregnancy  - Rh positive, antibody negative, rubella imm, infectious labs within normal limits.   - Urine cx, GC/CT negative.   - Taking zofran and unisom/B6 as well as prn Reglan for N/V.   - Early GCT wnl, HgbA1c 5.2  - Taking ASA for pre-e prophylaxis  - Discussed risks of twin pregnancy to include higher risk of preeclampsia, gestational diabetes, pre-term labor, etc.  - Discussed delivery route of twins is dependent on presentation of twin A and provider on call   - Will provide list of books on twins at next prenatal visit  - S/p flu vaccine    Genetic screening &  diagnosis  - Declined genetic screening  - Level 2  scheduled 23    Vitamin D deficiency  -  Continue vitamin D supplementation    Yeast Infection  - per vaginitis panel on 10/17  - s/p Diflucan course  - No longer having any symptoms    Headache  - Spontaneously improving. Tylenol prn.    Return to clinic in 4 weeks. Discussed return precautions.    Patient seen and plan of care discussed with Dr. Rakesh Davis MD  Obstetrics and Gynecology, PGY-1  11/14/23 6:44 AM     The patient was seen and examined with Dr. Davis.  I have reviewed and edited the above note.     Judith Cameron MD, FACOG

## 2023-11-27 ENCOUNTER — PRE VISIT (OUTPATIENT)
Dept: MATERNAL FETAL MEDICINE | Facility: CLINIC | Age: 28
End: 2023-11-27
Payer: COMMERCIAL

## 2023-11-28 ENCOUNTER — HOSPITAL ENCOUNTER (OUTPATIENT)
Dept: ULTRASOUND IMAGING | Facility: CLINIC | Age: 28
Discharge: HOME | End: 2023-11-28
Attending: REGISTERED NURSE
Payer: MEDICAID

## 2023-11-28 ENCOUNTER — OFFICE VISIT (OUTPATIENT)
Dept: MATERNAL FETAL MEDICINE | Facility: CLINIC | Age: 28
End: 2023-11-28
Attending: REGISTERED NURSE
Payer: MEDICAID

## 2023-11-28 DIAGNOSIS — O30.049 DICHORIONIC DIAMNIOTIC TWIN PREGNANCY, ANTEPARTUM: ICD-10-CM

## 2023-11-28 DIAGNOSIS — O26.90 PREGNANCY RELATED CONDITION, ANTEPARTUM: ICD-10-CM

## 2023-11-28 DIAGNOSIS — Z36.3 ENCOUNTER FOR ROUTINE SCREENING FOR FETAL MALFORMATION USING ULTRASOUND: Primary | ICD-10-CM

## 2023-11-28 PROCEDURE — 76812 OB US DETAILED ADDL FETUS: CPT

## 2023-11-28 PROCEDURE — 76811 OB US DETAILED SNGL FETUS: CPT | Mod: 26 | Performed by: OBSTETRICS & GYNECOLOGY

## 2023-11-28 PROCEDURE — 76812 OB US DETAILED ADDL FETUS: CPT | Mod: 26 | Performed by: OBSTETRICS & GYNECOLOGY

## 2023-11-28 NOTE — PROGRESS NOTES
Please refer to ultrasound report under 'Imaging' Studies of 'Chart Review' tabs.    Maciej Alonso M.D.

## 2023-12-11 ENCOUNTER — PRENATAL OFFICE VISIT (OUTPATIENT)
Dept: OBGYN | Facility: CLINIC | Age: 28
End: 2023-12-11
Attending: OBSTETRICS & GYNECOLOGY
Payer: COMMERCIAL

## 2023-12-11 VITALS
DIASTOLIC BLOOD PRESSURE: 68 MMHG | BODY MASS INDEX: 24.75 KG/M2 | WEIGHT: 145 LBS | HEIGHT: 64 IN | SYSTOLIC BLOOD PRESSURE: 96 MMHG | HEART RATE: 99 BPM

## 2023-12-11 DIAGNOSIS — O30.042 DICHORIONIC DIAMNIOTIC TWIN PREGNANCY IN SECOND TRIMESTER: ICD-10-CM

## 2023-12-11 DIAGNOSIS — D50.0 IRON DEFICIENCY ANEMIA DUE TO CHRONIC BLOOD LOSS: ICD-10-CM

## 2023-12-11 DIAGNOSIS — O30.041 DICHORIONIC DIAMNIOTIC TWIN PREGNANCY IN FIRST TRIMESTER: ICD-10-CM

## 2023-12-11 DIAGNOSIS — O09.92 HIGH-RISK PREGNANCY IN SECOND TRIMESTER: Primary | ICD-10-CM

## 2023-12-11 DIAGNOSIS — E55.9 VITAMIN D DEFICIENCY: ICD-10-CM

## 2023-12-11 PROCEDURE — 99207 PR PRENATAL VISIT: CPT | Performed by: OBSTETRICS & GYNECOLOGY

## 2023-12-11 PROCEDURE — G0463 HOSPITAL OUTPT CLINIC VISIT: HCPCS | Performed by: OBSTETRICS & GYNECOLOGY

## 2023-12-11 RX ORDER — ONDANSETRON 4 MG/1
4 TABLET, ORALLY DISINTEGRATING ORAL EVERY 8 HOURS PRN
Qty: 30 TABLET | Refills: 1 | Status: SHIPPED | OUTPATIENT
Start: 2023-12-11 | End: 2024-03-05

## 2023-12-11 RX ORDER — FERROUS SULFATE 325(65) MG
325 TABLET ORAL
Qty: 90 TABLET | Refills: 3 | Status: SHIPPED | OUTPATIENT
Start: 2023-12-11 | End: 2024-01-09

## 2023-12-11 RX ORDER — PYRIDOXINE HCL (VITAMIN B6) 25 MG
25 TABLET ORAL 3 TIMES DAILY PRN
Qty: 60 TABLET | Refills: 1 | Status: SHIPPED | OUTPATIENT
Start: 2023-12-11 | End: 2024-03-19

## 2023-12-11 NOTE — PATIENT INSTRUCTIONS
Thank you for trusting us with your care!     If you need to contact us for questions about:  Symptoms, Scheduling & Medical Questions; Non-urgent (2-3 day response) Jesus message, Urgent (needing response today) [phone] (if after 3:30pm next day response)   Prescriptions: Please call your Pharmacy   Billing: [place] [phone] or [facility]:[phone]

## 2023-12-13 NOTE — PROGRESS NOTES
CRYSTAL    S: Feeling tired, occasionally dizzy. Drinking okay, not always eating. Still has occasional headaches which resolve with sleep. Feeling movement. No cramping, vaginal bleeding.     O:   Vitals:    23 1457   BP: 96/68   Pulse: 99     General: No distress    A/P: 28 year old  @22w0d HRP  Di/Di twins: Plan growth Q4wks, currently concordant. Plan vaginal birth if twin A cephalic, previously reviewed provider limitations. Good candidate.   Dizziness: Hgb 10.5 today in clinic. Iron ordered. Encouarged increased protein/fat. Only 6lb weight gain with no weight gain in past 4 weeks.   EOB next  RTC 4 weeks    Mehreen Wynne MD

## 2023-12-26 ENCOUNTER — OFFICE VISIT (OUTPATIENT)
Dept: MATERNAL FETAL MEDICINE | Facility: CLINIC | Age: 28
End: 2023-12-26
Attending: OBSTETRICS & GYNECOLOGY
Payer: COMMERCIAL

## 2023-12-26 ENCOUNTER — HOSPITAL ENCOUNTER (OUTPATIENT)
Dept: ULTRASOUND IMAGING | Facility: CLINIC | Age: 28
Discharge: HOME | End: 2023-12-26
Attending: OBSTETRICS & GYNECOLOGY
Payer: COMMERCIAL

## 2023-12-26 DIAGNOSIS — O30.042 DICHORIONIC DIAMNIOTIC TWIN PREGNANCY IN SECOND TRIMESTER: Primary | ICD-10-CM

## 2023-12-26 DIAGNOSIS — O30.049 DICHORIONIC DIAMNIOTIC TWIN PREGNANCY, ANTEPARTUM: ICD-10-CM

## 2023-12-26 PROCEDURE — 99213 OFFICE O/P EST LOW 20 MIN: CPT | Mod: 25 | Performed by: OBSTETRICS & GYNECOLOGY

## 2023-12-26 PROCEDURE — 76816 OB US FOLLOW-UP PER FETUS: CPT | Mod: 59

## 2023-12-26 PROCEDURE — 76816 OB US FOLLOW-UP PER FETUS: CPT | Mod: 26 | Performed by: OBSTETRICS & GYNECOLOGY

## 2023-12-26 NOTE — PROGRESS NOTES
"Please see \"Imaging\" tab under \"Chart Review\" for details of today's visit.    oRdy Barahona    "

## 2023-12-26 NOTE — NURSING NOTE
Patient reports positive fetal movement x2, denies pain, denies contractions, leaking of fluid, or bleeding.   Education provided to patient on today's ultrasound.  SBAR given to NAVEED LEVI, see their note in Epic.

## 2024-01-09 ENCOUNTER — LAB (OUTPATIENT)
Dept: LAB | Facility: CLINIC | Age: 29
End: 2024-01-09
Attending: OBSTETRICS & GYNECOLOGY
Payer: COMMERCIAL

## 2024-01-09 ENCOUNTER — PRENATAL OFFICE VISIT (OUTPATIENT)
Dept: OBGYN | Facility: CLINIC | Age: 29
End: 2024-01-09
Attending: OBSTETRICS & GYNECOLOGY
Payer: COMMERCIAL

## 2024-01-09 VITALS
HEART RATE: 89 BPM | DIASTOLIC BLOOD PRESSURE: 68 MMHG | BODY MASS INDEX: 25.44 KG/M2 | HEIGHT: 64 IN | WEIGHT: 149 LBS | SYSTOLIC BLOOD PRESSURE: 104 MMHG

## 2024-01-09 DIAGNOSIS — E55.9 VITAMIN D DEFICIENCY: ICD-10-CM

## 2024-01-09 DIAGNOSIS — O09.92 HIGH-RISK PREGNANCY IN SECOND TRIMESTER: ICD-10-CM

## 2024-01-09 DIAGNOSIS — O09.92 HIGH-RISK PREGNANCY IN SECOND TRIMESTER: Primary | ICD-10-CM

## 2024-01-09 DIAGNOSIS — O30.042 DICHORIONIC DIAMNIOTIC TWIN PREGNANCY IN SECOND TRIMESTER: ICD-10-CM

## 2024-01-09 DIAGNOSIS — D50.0 IRON DEFICIENCY ANEMIA DUE TO CHRONIC BLOOD LOSS: ICD-10-CM

## 2024-01-09 LAB
ERYTHROCYTE [DISTWIDTH] IN BLOOD BY AUTOMATED COUNT: 14.2 % (ref 10–15)
GLUCOSE 1H P 50 G GLC PO SERPL-MCNC: 120 MG/DL (ref 70–129)
HCT VFR BLD AUTO: 31.3 % (ref 35–47)
HGB BLD-MCNC: 10.3 G/DL (ref 11.7–15.7)
MCH RBC QN AUTO: 30.4 PG (ref 26.5–33)
MCHC RBC AUTO-ENTMCNC: 32.9 G/DL (ref 31.5–36.5)
MCV RBC AUTO: 92 FL (ref 78–100)
PLATELET # BLD AUTO: 140 10E3/UL (ref 150–450)
RBC # BLD AUTO: 3.39 10E6/UL (ref 3.8–5.2)
T PALLIDUM AB SER QL: NONREACTIVE
VIT D+METAB SERPL-MCNC: 30 NG/ML (ref 20–50)
WBC # BLD AUTO: 6 10E3/UL (ref 4–11)

## 2024-01-09 PROCEDURE — 85027 COMPLETE CBC AUTOMATED: CPT

## 2024-01-09 PROCEDURE — 99207 PR PRENATAL VISIT: CPT | Performed by: OBSTETRICS & GYNECOLOGY

## 2024-01-09 PROCEDURE — 86780 TREPONEMA PALLIDUM: CPT

## 2024-01-09 PROCEDURE — 36415 COLL VENOUS BLD VENIPUNCTURE: CPT

## 2024-01-09 PROCEDURE — 82950 GLUCOSE TEST: CPT

## 2024-01-09 PROCEDURE — 82306 VITAMIN D 25 HYDROXY: CPT

## 2024-01-09 PROCEDURE — G0463 HOSPITAL OUTPT CLINIC VISIT: HCPCS | Performed by: OBSTETRICS & GYNECOLOGY

## 2024-01-09 RX ORDER — METOCLOPRAMIDE 5 MG/1
5 TABLET ORAL 3 TIMES DAILY PRN
Qty: 60 TABLET | Refills: 0 | Status: SHIPPED | OUTPATIENT
Start: 2024-01-09 | End: 2024-03-19

## 2024-01-09 RX ORDER — FERROUS SULFATE 325(65) MG
325 TABLET ORAL
Qty: 90 TABLET | Refills: 3 | Status: SHIPPED | OUTPATIENT
Start: 2024-01-09 | End: 2024-03-05

## 2024-01-09 RX ORDER — PRENATAL VIT/IRON FUM/FOLIC AC 27MG-0.8MG
1 TABLET ORAL DAILY
Qty: 90 TABLET | Refills: 3 | Status: SHIPPED | OUTPATIENT
Start: 2024-01-09 | End: 2024-03-05

## 2024-01-09 RX ORDER — ASPIRIN 81 MG/1
81 TABLET, CHEWABLE ORAL DAILY
Qty: 90 TABLET | Refills: 3 | Status: ON HOLD | OUTPATIENT
Start: 2024-01-09 | End: 2024-03-30

## 2024-01-09 NOTE — PROGRESS NOTES
"CRYSTAL    S: Doing well. Good movement x 2. No ctx, lof, vb. Had headache last night which resolved with sleep. Denies swelling or heartburn. Is not taking iron or baby ASA, needs refills. Has questions about safety of ASA after reading package insert.     EOB  Delivery: Hoping for  of twins, has 4 prior  and twins are concordant.   Pain meds: Planning epidural for twin vaginal delivery- used with last birth though did not work well  Feeding: Plans to breastfeed, has successfully  other children  Contraception: Declines, has not used in past.  living in [facility]    O: /68   Pulse 89   Ht 1.62 m (5' 3.78\")   Wt 67.6 kg (149 lb)   LMP 2023 (Exact Date)   BMI 25.75 kg/m      A/P: 29 year old  @26w1d HRP  Di/Di twins: Currently breech/cephalic with 1% discordance. Continue  testing as scheduled. Plans for vaginal delivery- reviewed need of 1st twin to be cephalic, use of epidural and delivery by 38wks.   EOB: GCT, CBC, RPR today  Reviewed safety of baby ASA in pregnancy and rationale for use. Meds refilled today.   Weight gain: Improved from last visit, up 4lbs. 10lbs total. Continue to monitor.   Tdap next  RTC 4 weeks    Mehreen Wynne MD    "

## 2024-01-23 ENCOUNTER — OFFICE VISIT (OUTPATIENT)
Dept: MATERNAL FETAL MEDICINE | Facility: CLINIC | Age: 29
End: 2024-01-23
Attending: OBSTETRICS & GYNECOLOGY
Payer: COMMERCIAL

## 2024-01-23 ENCOUNTER — HOSPITAL ENCOUNTER (OUTPATIENT)
Dept: ULTRASOUND IMAGING | Facility: CLINIC | Age: 29
Discharge: HOME | End: 2024-01-23
Attending: OBSTETRICS & GYNECOLOGY
Payer: COMMERCIAL

## 2024-01-23 VITALS — DIASTOLIC BLOOD PRESSURE: 60 MMHG | SYSTOLIC BLOOD PRESSURE: 101 MMHG

## 2024-01-23 DIAGNOSIS — O30.049 DICHORIONIC DIAMNIOTIC TWIN PREGNANCY, ANTEPARTUM: ICD-10-CM

## 2024-01-23 DIAGNOSIS — O30.042 DICHORIONIC DIAMNIOTIC TWIN PREGNANCY IN SECOND TRIMESTER: ICD-10-CM

## 2024-01-23 DIAGNOSIS — O36.5932 INTRAUTERINE GROWTH RESTRICTION AFFECTING ANTEPARTUM CARE OF MOTHER, THIRD TRIMESTER, FETUS 2: ICD-10-CM

## 2024-01-23 PROCEDURE — 76820 UMBILICAL ARTERY ECHO: CPT | Mod: 26 | Performed by: OBSTETRICS & GYNECOLOGY

## 2024-01-23 PROCEDURE — 99215 OFFICE O/P EST HI 40 MIN: CPT | Mod: 25 | Performed by: OBSTETRICS & GYNECOLOGY

## 2024-01-23 PROCEDURE — 59025 FETAL NON-STRESS TEST: CPT | Mod: 59

## 2024-01-23 PROCEDURE — 76816 OB US FOLLOW-UP PER FETUS: CPT | Mod: 26 | Performed by: OBSTETRICS & GYNECOLOGY

## 2024-01-23 PROCEDURE — 76820 UMBILICAL ARTERY ECHO: CPT

## 2024-01-23 PROCEDURE — 59025 FETAL NON-STRESS TEST: CPT | Mod: 26 | Performed by: OBSTETRICS & GYNECOLOGY

## 2024-01-23 PROCEDURE — 59025 FETAL NON-STRESS TEST: CPT

## 2024-01-23 PROCEDURE — 76816 OB US FOLLOW-UP PER FETUS: CPT | Mod: 59

## 2024-01-23 NOTE — NURSING NOTE
Patient reports positive fetal movement, no pain, no contractions, leaking of fluid, or bleeding.  Patient denies headache, visual changes, nausea/vomiting, epigastric pain related to preeclampsia.  Education provided to patient on todays ultrasound.  SBAR given to NAVEED LEVI, see their note in Epic.

## 2024-01-23 NOTE — PROGRESS NOTES
I met with patient and with assistance of virtual  discussed the findings and associated management of FGR fetus 2.    1. Dichorionic diamniotic twins at 28 1/7 weeks  here for assessment of fetal growth.   2. Growth parameters and estimated fetal weight were consistent with established dates for fetus 1 and FGR (EFW today 9th percentile) for fetus 2, with an inter-twin discordance of 9%.  3. None of the anomalies commonly detected by ultrasound were evident in the limited fetal anatomic survey described above for either twin, anatomy limited by gestational age and fetal lie.  4. The amniotic fluid volume appeared normal around both twins.   5. Anterior placenta for both fetuses.  6. There are two cystic structures lying between the chorioamniotic separation between the twins. No vascular structures are identified within or surrounding the cysts.  7. Umbilical artery PI waveform is normal for fetus 1 and elevated for fetus 2.  8. The CTG is reassuring for both fetuses.    Continue  surveillance with fetal growth scans every 3 weeks and fetal surveillance with once weekly modified BPP and UA Doppler waveform assessment.    The findings on today's ultrasound are consistent with fetal growth restriction (FGR). I reviewed that we consider a pregnancy to be affected by FGR when the overall EFW is <10th percentile or if the abdominal circumference (AC) is < 10th percentile as they have been found to be equally predictive of small for gestational age infants. We discussed the potential etiologies of FGR including incorrect dating, constitutional, infectious etiologies (specifically CMV), fetal genetic and structural abnormalities as well as placental and umbilical cord abnormalities.  Patient declines any additional testing at this time.    Lastly, we reviewed that regardless of the etiology of FGR, we recommend additional monitoring due to the increased risk for stillbirth and that the goal of  management rests on balancing the risk of stillbirth with the risks of prematurity.  The recommend surveillance and management of pregnancies complicated by FGR includes serial assessment of fetal growth (every 3-4 weeks) in addition to weekly umbilical artery Doppler and  surveillance, with more frequent assessment if Doppler is abnormal. Timing of delivery will depend on Doppler findings, amniotic fluid, fetal monitoring, and interval fetal growth.  In otherwise uncomplicated FGR pregnancies with normal Doppler delivery is recommended at 38-39 weeks. She should be closely monitored for the possible development of a hypertensive disorder.    Thank you for the opportunity to participate in the care of this patient.  If you have questions regarding today's evaluation or if we can be of further service, please contact the [facility].    Maciej Alonso M.D.  Maternal Fetal Medicine  24    I personally spent a total of 45 minutes, including both face-to-face and non-face-to-face on the date of the encounter, addressing the above diagnosis. Activities performed in this time include chart review, obtaining / reviewing history, performing a medically necessary evaluation, documentation and Charge activities: counseling, care coordination, ordering tests, and reviewing results, equivalent to medical decision making that is of moderate complexity.

## 2024-01-24 PROBLEM — O36.5990 FETAL GROWTH RESTRICTION ANTEPARTUM: Status: ACTIVE | Noted: 2024-01-24

## 2024-01-31 ENCOUNTER — OFFICE VISIT (OUTPATIENT)
Dept: MATERNAL FETAL MEDICINE | Facility: CLINIC | Age: 29
End: 2024-01-31
Attending: OBSTETRICS & GYNECOLOGY
Payer: COMMERCIAL

## 2024-01-31 ENCOUNTER — HOSPITAL ENCOUNTER (OUTPATIENT)
Dept: ULTRASOUND IMAGING | Facility: CLINIC | Age: 29
Discharge: HOME | End: 2024-01-31
Attending: OBSTETRICS & GYNECOLOGY
Payer: COMMERCIAL

## 2024-01-31 VITALS — SYSTOLIC BLOOD PRESSURE: 90 MMHG | DIASTOLIC BLOOD PRESSURE: 60 MMHG

## 2024-01-31 DIAGNOSIS — O36.5932 INTRAUTERINE GROWTH RESTRICTION AFFECTING ANTEPARTUM CARE OF MOTHER, THIRD TRIMESTER, FETUS 2: ICD-10-CM

## 2024-01-31 PROCEDURE — 59025 FETAL NON-STRESS TEST: CPT | Mod: 59

## 2024-01-31 PROCEDURE — 59025 FETAL NON-STRESS TEST: CPT | Mod: 26 | Performed by: OBSTETRICS & GYNECOLOGY

## 2024-01-31 PROCEDURE — 76815 OB US LIMITED FETUS(S): CPT | Mod: 26 | Performed by: OBSTETRICS & GYNECOLOGY

## 2024-01-31 PROCEDURE — 76820 UMBILICAL ARTERY ECHO: CPT | Mod: 26 | Performed by: OBSTETRICS & GYNECOLOGY

## 2024-01-31 PROCEDURE — 76820 UMBILICAL ARTERY ECHO: CPT

## 2024-01-31 PROCEDURE — 76815 OB US LIMITED FETUS(S): CPT

## 2024-01-31 PROCEDURE — 59025 FETAL NON-STRESS TEST: CPT

## 2024-01-31 NOTE — NURSING NOTE
Patient reports positive fetal movement, denies contractions, leaking of fluid, or bleeding.  NST Performed due to FGR.  Dr. Owens reviewed efm tracing. See NST/BPP Doc Flowsheet tab. SBAR given to NAVEED LEVI, see their note in Epic.

## 2024-01-31 NOTE — PROGRESS NOTES
Please see full imaging report from [employer] program under imaging tab.    Guillermina Owens MD  Maternal Fetal Medicine

## 2024-02-05 ENCOUNTER — OFFICE VISIT (OUTPATIENT)
Dept: MATERNAL FETAL MEDICINE | Facility: CLINIC | Age: 29
End: 2024-02-05
Attending: OBSTETRICS & GYNECOLOGY
Payer: COMMERCIAL

## 2024-02-05 ENCOUNTER — HOSPITAL ENCOUNTER (OUTPATIENT)
Dept: ULTRASOUND IMAGING | Facility: CLINIC | Age: 29
Discharge: HOME | End: 2024-02-05
Attending: OBSTETRICS & GYNECOLOGY
Payer: COMMERCIAL

## 2024-02-05 VITALS — DIASTOLIC BLOOD PRESSURE: 66 MMHG | SYSTOLIC BLOOD PRESSURE: 102 MMHG

## 2024-02-05 DIAGNOSIS — O36.5932 INTRAUTERINE GROWTH RESTRICTION AFFECTING ANTEPARTUM CARE OF MOTHER, THIRD TRIMESTER, FETUS 2: ICD-10-CM

## 2024-02-05 PROCEDURE — 76815 OB US LIMITED FETUS(S): CPT

## 2024-02-05 PROCEDURE — 59025 FETAL NON-STRESS TEST: CPT | Mod: 26 | Performed by: OBSTETRICS & GYNECOLOGY

## 2024-02-05 PROCEDURE — 76820 UMBILICAL ARTERY ECHO: CPT | Mod: 26 | Performed by: OBSTETRICS & GYNECOLOGY

## 2024-02-05 PROCEDURE — 76815 OB US LIMITED FETUS(S): CPT | Mod: 26 | Performed by: OBSTETRICS & GYNECOLOGY

## 2024-02-05 PROCEDURE — 76820 UMBILICAL ARTERY ECHO: CPT | Mod: 59

## 2024-02-05 PROCEDURE — 59025 FETAL NON-STRESS TEST: CPT

## 2024-02-05 PROCEDURE — 59025 FETAL NON-STRESS TEST: CPT | Mod: 59

## 2024-02-05 NOTE — PATIENT INSTRUCTIONS
Weeks 26 to 30 of Your Pregnancy: Care Instructions  You're starting your last trimester. You'll probably feel your baby moving around more. Your back may ache as your body gets used to your baby's size and length. Take care of yourself, and pay attention to what your body needs.    Talk to your doctor about getting the Tdap shot. It will help protect your  against whooping cough (pertussis). Also ask your doctor about flu and COVID-19 shots if you haven't had them yet. If your blood type is Rh negative, you may be given a shot of Rh immune globulin (such as RhoGAM). It can help prevent problems for your baby.   You may have [place] contractions. They are single or several strong contractions without a pattern. These are practice contractions but not the start of labor.   Be kind to yourself.     Take breaks when you're tired.  Change positions often. Don't sit for too long or stand for too long.  At work, rest during breaks if you can. If you don't get breaks, talk to your doctor about writing a letter to your employer to request them.  Avoid fumes, chemicals, and tobacco smoke.  Be sexual if you want to.     You may be interested in sex, or you may not. Everyone is different.  Sex is okay unless your doctor tells you not to.  Your belly can make it hard to find good positions for sex. Kraemer and explore.  Watch for signs of  labor.    These signs include:   Menstrual-like cramps. Or you may have pain or pressure in your pelvis that happens in a pattern.  About 6 or more contractions in an hour (even after rest and a glass of water).  A low, dull backache that doesn't go away when you change positions.  An increase or change in vaginal discharge.  Light vaginal bleeding or spotting.  Your water breaking.  Know what to do if you think you are having contractions.     Drink 1 or 2 glasses of water.  Lie down on your left side for at least an hour.  While on your side, feel the top of your  "belly to see if it's tight.  Write down your contractions for an hour. Time how long it is from the start of one contraction to the start of the next.  Call your doctor if you have regular contractions.  Follow-up care is a key part of your treatment and safety. Be sure to make and go to all appointments, and call your doctor if you are having problems. It's also a good idea to know your test results and keep a list of the medicines you take.  Where can you learn more?  Go to https://www.[employer].net/patiented  Enter S999 in the search box to learn more about \"Weeks 26 to 30 of Your Pregnancy: Care Instructions.\"  Current as of: July 11, 2023               Content Version: 13.8    1614-7595 [email or web address].   Care instructions adapted under license by your healthcare professional. If you have questions about a medical condition or this instruction, always ask your healthcare professional. [email or web address].co disclaims any warranty or liability for your use of this information.      Learning About Birth Control After Childbirth  What is birth control?  Birth control is any method used to prevent pregnancy. If you have vaginal sex without birth control, you could get pregnant--even if you haven't started having periods again. You're less likely to get pregnant while breastfeeding, but it's still possible. Finding birth control that works for you can help avoid an unplanned pregnancy.  There are many kinds of birth control. Each has pros and cons. Find what works for you. Talk to your doctor if you've just given birth or are breastfeeding.    Long-acting reversible contraception (LARC). These are placed inside your body by a doctor. They can prevent pregnancy for years.  Examples include:  An implant (hormonal).  Copper intrauterine device (IUD).  Hormonal IUDs.   Short-acting hormonal methods. These release hormones. Examples include:  Combination birth control pills (\"the pill\").  Skin patches.  A " "vaginal ring.  A shot.  Mini-pills. Choose progestin-only options soon after giving birth.     Barrier methods. Use these every time you have vaginal sex.  Examples include:  External (male) condoms.  Internal (female) condoms.  Diaphragms.  Cervical caps.  Sponges.   Spermicides. These kill sperm or stop sperm from moving. They can be gels, creams, foams, films, or tablets. Use them before vaginal sex.  Examples include:  Nonoxynol-9.  pH regulator gel.     Permanent birth control (sterilization). This can be an option if you're sure that you don't want to get pregnant later.  Examples include:  Vasectomy.  Having tubes tied (tubal ligation).   Emergency contraception. This is a backup method. Use it if you didn't use birth control or your birth control method failed.  Examples include:  Copper and hormonal IUDs.  Emergency contraceptive pills.     Fertility awareness. You'll learn when you are most likely to become pregnant (are fertile). You can avoid vaginal sex at that time.  It's also called:  Natural family planning.  The rhythm method.   Breastfeeding. This is most effective when all of these are true:  Your baby is younger than 6 months old.  You're breastfeeding and not bottle-feeding at all.  You aren't having periods.   Follow-up care is a key part of your treatment and safety. Be sure to make and go to all appointments, and call your doctor if you are having problems. It's also a good idea to know your test results and keep a list of the medicines you take.  Where can you learn more?  Go to https://www.Sootoo.com.net/patiented  Enter X408 in the search box to learn more about \"Learning About Birth Control After Childbirth.\"  Current as of: July 11, 2023               Content Version: 13.8    4584-9292 [email or web address].   Care instructions adapted under license by your healthcare professional. If you have questions about a medical condition or this instruction, always ask your healthcare " professional. [employer] Incorporated disclaims any warranty or liability for your use of this information.    Thank you for trusting us with your care!     If you need to contact us for questions about:  Symptoms, Scheduling & Medical Questions; Non-urgent (2-3 day response) Jesus message, Urgent (needing response today) [phone] (if after 3:30pm next day response)   Prescriptions: Please call your Pharmacy   Billing: Rocky [phone] or [facility]:[phone]

## 2024-02-05 NOTE — NURSING NOTE
Patient reports positive fetal movement, no pain, no contractions, leaking of fluid, or bleeding.    Patient denies headache, visual changes, nausea/vomiting, epigastric pain related to preeclampsia.  Education provided to patient on today's ultrasound.  SBAR given to NAVEED LEVI, see their note in Epic.   NST Performed due to FGR.   reviewed efm tracing. See NST/BPP Doc Flowsheet tab.

## 2024-02-06 ENCOUNTER — PRENATAL OFFICE VISIT (OUTPATIENT)
Dept: OBGYN | Facility: CLINIC | Age: 29
End: 2024-02-06
Attending: OBSTETRICS & GYNECOLOGY
Payer: COMMERCIAL

## 2024-02-06 VITALS
HEART RATE: 88 BPM | HEIGHT: 64 IN | BODY MASS INDEX: 25.93 KG/M2 | SYSTOLIC BLOOD PRESSURE: 109 MMHG | WEIGHT: 151.9 LBS | DIASTOLIC BLOOD PRESSURE: 73 MMHG

## 2024-02-06 DIAGNOSIS — O09.93 HIGH-RISK PREGNANCY IN THIRD TRIMESTER: Primary | ICD-10-CM

## 2024-02-06 PROCEDURE — G0463 HOSPITAL OUTPT CLINIC VISIT: HCPCS | Performed by: OBSTETRICS & GYNECOLOGY

## 2024-02-06 PROCEDURE — 250N000011 HC RX IP 250 OP 636

## 2024-02-06 PROCEDURE — 99207 PR PRENATAL VISIT: CPT | Performed by: OBSTETRICS & GYNECOLOGY

## 2024-02-06 PROCEDURE — 90715 TDAP VACCINE 7 YRS/> IM: CPT

## 2024-02-06 PROCEDURE — G0463 HOSPITAL OUTPT CLINIC VISIT: HCPCS | Mod: 25 | Performed by: OBSTETRICS & GYNECOLOGY

## 2024-02-06 PROCEDURE — 90471 IMMUNIZATION ADMIN: CPT

## 2024-02-06 NOTE — PROGRESS NOTES
"[facility]  Return OB Visit    S: Denies vaginal bleeding, vaginal discharge, LOF, contractions.  Reports good fetal movement. No vision changes, RUQ pain, chest pain, SOB. Endorses headaches 1-2 times weekly but states that they are well controlled. Endorses taking iron as prescribed. Has questions about delivery timing.     Pregnancy notable for  - dichorionic, diamniotic twins  - FGR twin 2 (9%ile)     O: /73   Pulse 88   Ht 1.62 m (5' 3.78\")   Wt 68.9 kg (151 lb 14.4 oz)   LMP 2023 (Exact Date)   BMI 26.25 kg/m    Weight gain: 5.851 kg (12 lb 14.4 oz)    Gen: Well-appearing, NAD  FHR: 130 on the right, 140 on the left     A/P:  Xiao Vivas is a 29 year old  at 30w1d HRP, here for return OB visit.    Di/Di twins: Currently breech/cephalic. Continue  testing as scheduled. Plans for vaginal delivery- reviewed need of 1st twin to be cephalic, use of epidural and delivery by 38wks. Discussed that if fetus 1 remains breech,  would be recommended.  Reviewed delivery timing may change depending on growth  Weight gain: Up 2lbs from last visit. Continue to monitor.   Tdap today    Anemia/thrombocytopenia  - Taking ferrous sulfate 325 mg PO QD  - Recheck CBC at next visit    Headaches  - Well controlled with Tylenol and reglan as needed    Return to clinic in 2 weeks. Discussed return precautions and kick counts.     Staffed with Dr. Sherrell Luna, MS3    Physician Attestation   I, Mehreen Wynne MD, was present with the medical/FABRIZIO student who participated in the service and in the documentation of the note.  I have verified the history and personally performed the physical exam and medical decision making.  I agree with the assessment and plan of care as documented in the note.      Items personally reviewed: vitals and imaging and agree with the interpretation documented in the note.    Mehreen Wynne MD    Visit was conducted using [employer] "  via ipad.

## 2024-02-12 ENCOUNTER — APPOINTMENT (OUTPATIENT)
Dept: INTERPRETER SERVICES | Facility: CLINIC | Age: 29
End: 2024-02-12
Payer: COMMERCIAL

## 2024-03-04 ENCOUNTER — OFFICE VISIT (OUTPATIENT)
Dept: MATERNAL FETAL MEDICINE | Facility: CLINIC | Age: 29
End: 2024-03-04
Attending: OBSTETRICS & GYNECOLOGY
Payer: COMMERCIAL

## 2024-03-04 ENCOUNTER — HOSPITAL ENCOUNTER (OUTPATIENT)
Dept: ULTRASOUND IMAGING | Facility: CLINIC | Age: 29
Discharge: HOME | End: 2024-03-04
Attending: OBSTETRICS & GYNECOLOGY
Payer: COMMERCIAL

## 2024-03-04 VITALS — DIASTOLIC BLOOD PRESSURE: 68 MMHG | SYSTOLIC BLOOD PRESSURE: 109 MMHG

## 2024-03-04 DIAGNOSIS — O36.5932 INTRAUTERINE GROWTH RESTRICTION AFFECTING ANTEPARTUM CARE OF MOTHER, THIRD TRIMESTER, FETUS 2: Primary | ICD-10-CM

## 2024-03-04 DIAGNOSIS — O36.5932 INTRAUTERINE GROWTH RESTRICTION AFFECTING ANTEPARTUM CARE OF MOTHER, THIRD TRIMESTER, FETUS 2: ICD-10-CM

## 2024-03-04 PROCEDURE — 59025 FETAL NON-STRESS TEST: CPT | Mod: 26 | Performed by: OBSTETRICS & GYNECOLOGY

## 2024-03-04 PROCEDURE — 59025 FETAL NON-STRESS TEST: CPT | Mod: 59

## 2024-03-04 PROCEDURE — 76816 OB US FOLLOW-UP PER FETUS: CPT | Mod: 59

## 2024-03-04 PROCEDURE — 76816 OB US FOLLOW-UP PER FETUS: CPT | Mod: 26 | Performed by: OBSTETRICS & GYNECOLOGY

## 2024-03-04 PROCEDURE — 76820 UMBILICAL ARTERY ECHO: CPT | Mod: 26 | Performed by: OBSTETRICS & GYNECOLOGY

## 2024-03-04 PROCEDURE — 76820 UMBILICAL ARTERY ECHO: CPT

## 2024-03-04 PROCEDURE — 59025 FETAL NON-STRESS TEST: CPT

## 2024-03-04 NOTE — PATIENT INSTRUCTIONS
Weeks 34 to 36 of Your Pregnancy: Care Instructions  Your belly has grown quite large. It's almost time to give birth! Your baby's lungs are almost ready to breathe air. The skull bones are firm enough to protect your baby's head. But they're soft enough to move down through the birth canal.    You might be wondering what to expect during labor. Because each birth is different, there's no way to know exactly what childbirth will be like for you. Talk to your doctor or midwife about any concerns you have.   You'll probably have a test for group B streptococcus (GBS). GBS is bacteria that can live in the vagina and rectum. GBS can make your baby sick after birth. If you test positive, you'll get antibiotics during labor.     Choose what type of pain relief you would like during labor.  You can choose from a few types, including medicine and non-medicine options. You may want to use several types of pain relief.     Know how medicines can help with pain during labor.  Some medicines lower anxiety and help with some of the pain. Others make your lower body numb so that you will feel less pain.     Tell your doctor about your pain medicine choice.  Do this before you start labor or very early in your labor. You may be able to change your mind during labor.     Learn about the stages of labor.    The first stage includes the early (latent) and active phases of labor. Contractions start in early labor. During active labor, contractions get stronger, last longer, and happen more often. And the cervix opens more rapidly.  The second stage starts when you're ready to push. During this stage, your baby is born.  During the third stage, you'll usually have a few more contractions to push out the placenta.   Follow-up care is a key part of your treatment and safety. Be sure to make and go to all appointments, and call your doctor if you are having problems. It's also a good idea to know your test results and keep a list of the  "medicines you take.  Where can you learn more?  Go to https://www.LLUSTRE.net/patiented  Enter B912 in the search box to learn more about \"Weeks 34 to 36 of Your Pregnancy: Care Instructions.\"  Current as of: 2023               Content Version: 13.8    4795-0799 [employer].   Care instructions adapted under license by your healthcare professional. If you have questions about a medical condition or this instruction, always ask your healthcare professional. [employer] disclaims any warranty or liability for your use of this information.      Group B Strep During Pregnancy: Care Instructions  Overview     Group B strep infection is caused by a type of bacteria. It's a different kind of bacteria than the kind that causes strep throat.  You may have this kind of bacteria in your body. Sometimes it may cause an infection, but most of the time it doesn't make you sick or cause symptoms. But if you pass the bacteria to your baby during the birth, it can cause serious health problems for your baby.  If you have this bacteria in your body, you will get antibiotics when you are in labor. Antibiotics help prevent problems for a  baby.  After birth, doctors will watch and may test your baby. If your baby tests positive for Group B strep, your baby will get antibiotics.  If you plan to breastfeed your baby, don't worry. It will be safe to breastfeed.  Follow-up care is a key part of your treatment and safety. Be sure to make and go to all appointments, and call your doctor if you are having problems. It's also a good idea to know your test results and keep a list of the medicines you take.  How can you care for yourself at home?  If your doctor has prescribed antibiotics, take them as directed. Do not stop taking them just because you feel better. You need to take the full course of antibiotics.  Tell your doctor if you are allergic to any antibiotic.  If you go into labor, or your " "water breaks, go to the hospital. Your doctor will give you antibiotics to help protect your baby from infection.  Tell the doctors and nurses if you have an allergy to penicillin.  Tell the doctors and nurses at the hospital that you tested positive for group B strep.  When should you call for help?   Call your doctor now or seek immediate medical care if:    You have symptoms of a urinary tract infection. These may include:  Pain or burning when you urinate.  A frequent need to urinate without being able to pass much urine.  Pain in the flank, which is just below the rib cage and above the waist on either side of the back.  Blood in your urine.  A fever.     You think you are in labor or your water has broken.     You have pain in your belly or pelvis.   Watch closely for changes in your health, and be sure to contact your doctor if you have any problems.  Where can you learn more?  Go to https://www.SilverPush.Cloudtop/patiented  Enter M001 in the search box to learn more about \"Group B Strep During Pregnancy: Care Instructions.\"  Current as of: June 13, 2023               Content Version: 13.8    1658-2517 "[employer]".   Care instructions adapted under license by your healthcare professional. If you have questions about a medical condition or this instruction, always ask your healthcare professional. "[employer] Mobile Secretary" disclaims any warranty or liability for your use of this information.      Circumcision in Infants: What to Expect at Home  Your Child's Recovery  After circumcision, your baby's penis may look red and swollen. It may have petroleum jelly and gauze on it. The gauze will likely come off when your baby urinates. Follow your doctor's directions about whether to put clean gauze back on your baby's penis or to leave the gauze off. If you need to remove gauze from the penis, use warm water to soak the gauze and gently loosen it.  The doctor may have used a Plastibell device to do the " circumcision. If so, your baby will have a plastic ring around the head of the penis. The ring should fall off by itself in 10 to 12 days.  A thin, yellow film may form over the area the day after the procedure. This is part of the normal healing process. It should go away in a few days.  Your baby may seem fussy while the area heals. It may hurt for your baby to urinate. This pain often gets better in 3 or 4 days. But it may last for up to 2 weeks.  Even though your baby's penis will likely start to feel better after 3 or 4 days, it may look worse. The penis often starts to look like it's getting better after about 7 to 10 days.  This care sheet gives you a general idea about how long it will take for your child to recover. But each child recovers at a different pace. Follow the steps below to help your child get better as quickly as possible.  How can you care for your child at home?  Activity    Let your baby rest as much as possible. Sleeping will help with recovery.     You can give your baby a sponge bath the day after surgery. Ask your doctor when it is okay to give your baby a bath.   Medicines    Your doctor will tell you if and when your child can restart any medicines. The doctor will also give you instructions about your child taking any new medicines.     Your doctor may recommend giving your baby acetaminophen (Tylenol) to help with pain after the procedure. Be safe with medicines. Give your child medicines exactly as prescribed. Call your doctor if you think your child is having a problem with a medicine.     Do not give your child two or more pain medicines at the same time unless the doctor told you to. Many pain medicines have acetaminophen, which is Tylenol. Too much acetaminophen (Tylenol) can be harmful.   Circumcision care    Always wash your hands before and after touching the circumcision area.     Gently wash your baby's penis with plain, warm water after each diaper change, and pat it dry.  "Do not use soap. Don't use hydrogen peroxide or alcohol. They can slow healing.     Do not try to remove the film that forms on the penis. The film will go away on its own.     Put plenty of petroleum jelly (such as Vaseline) on the circumcision area during each diaper change. This will prevent your baby's penis from sticking to the diaper while it heals.     Fasten your baby's diapers loosely so that there is less pressure on the penis while it heals.   Follow-up care is a key part of your child's treatment and safety. Be sure to make and go to all appointments, and call your doctor if your child is having problems. It's also a good idea to know your child's test results and keep a list of the medicines your child takes.  When should you call for help?   Call your doctor now or seek immediate medical care if:    Your baby has a fever over 100.4 F.     Your baby is extremely fussy or irritable, has a high-pitched cry, or refuses to eat.     Your baby does not have a wet diaper within 12 hours after the circumcision.     You find a spot of bleeding larger than a 2-inch [place] from the incision.     Your baby has signs of infection. Signs may include severe swelling; redness; a red streak on the shaft of the penis; or a thick, yellow discharge.   Watch closely for changes in your child's health, and be sure to contact your doctor if:    A Plastibell device was used for the circumcision and the ring has not fallen off after 10 to 12 days.   Where can you learn more?  Go to https://www.PayParade Pictures.net/patiented  Enter S255 in the search box to learn more about \"Circumcision in Infants: What to Expect at Home.\"  Current as of: February 28, 2023               Content Version: 13.8    1512-8406 [employer].   Care instructions adapted under license by your healthcare professional. If you have questions about a medical condition or this instruction, always ask your healthcare professional. [employer]oka, " Incorporated disclaims any warranty or liability for your use of this information.    Thank you for trusting us with your care!     If you need to contact us for questions about:  Symptoms, Scheduling & Medical Questions; Non-urgent (2-3 day response) Jesus message, Urgent (needing response today) [phone] (if after 3:30pm next day response)   Prescriptions: Please call your Pharmacy   Billing: Rocky [phone] or [facility]:[phone]

## 2024-03-04 NOTE — NURSING NOTE
Patient reports positive fetal movement, c/o some back pain, denies contractions, leaking of fluid, or bleeding.  Patient has appointment tomorrow with OB, will call clinic if worsens.   Education provided to patient on today's ultrasound.  SBAR given to MFVARINDER LEVI, see their note in Epic.  NST Performed due to FGR of Twin B.  Dr. Alonso reviewed efm tracing. See NST/BPP Doc Flowsheet tab.

## 2024-03-05 ENCOUNTER — PRENATAL OFFICE VISIT (OUTPATIENT)
Dept: OBGYN | Facility: CLINIC | Age: 29
End: 2024-03-05
Attending: ADVANCED PRACTICE MIDWIFE
Payer: COMMERCIAL

## 2024-03-05 ENCOUNTER — LAB (OUTPATIENT)
Dept: LAB | Facility: CLINIC | Age: 29
End: 2024-03-05
Attending: ADVANCED PRACTICE MIDWIFE
Payer: COMMERCIAL

## 2024-03-05 VITALS
BODY MASS INDEX: 26.77 KG/M2 | DIASTOLIC BLOOD PRESSURE: 76 MMHG | WEIGHT: 156.8 LBS | HEART RATE: 89 BPM | HEIGHT: 64 IN | SYSTOLIC BLOOD PRESSURE: 110 MMHG

## 2024-03-05 DIAGNOSIS — E55.9 VITAMIN D DEFICIENCY: ICD-10-CM

## 2024-03-05 DIAGNOSIS — D69.6 THROMBOCYTOPENIA DURING PREGNANCY (H): ICD-10-CM

## 2024-03-05 DIAGNOSIS — O30.041 DICHORIONIC DIAMNIOTIC TWIN PREGNANCY IN FIRST TRIMESTER: ICD-10-CM

## 2024-03-05 DIAGNOSIS — O09.93 HIGH-RISK PREGNANCY IN THIRD TRIMESTER: Primary | ICD-10-CM

## 2024-03-05 DIAGNOSIS — O99.119 THROMBOCYTOPENIA DURING PREGNANCY (H): ICD-10-CM

## 2024-03-05 DIAGNOSIS — D50.0 IRON DEFICIENCY ANEMIA DUE TO CHRONIC BLOOD LOSS: ICD-10-CM

## 2024-03-05 DIAGNOSIS — O30.042 DICHORIONIC DIAMNIOTIC TWIN PREGNANCY IN SECOND TRIMESTER: ICD-10-CM

## 2024-03-05 LAB
ERYTHROCYTE [DISTWIDTH] IN BLOOD BY AUTOMATED COUNT: 15.5 % (ref 10–15)
HCT VFR BLD AUTO: 33.8 % (ref 35–47)
HGB BLD-MCNC: 10.7 G/DL (ref 11.7–15.7)
MCH RBC QN AUTO: 28 PG (ref 26.5–33)
MCHC RBC AUTO-ENTMCNC: 31.7 G/DL (ref 31.5–36.5)
MCV RBC AUTO: 89 FL (ref 78–100)
PLATELET # BLD AUTO: 118 10E3/UL (ref 150–450)
RBC # BLD AUTO: 3.82 10E6/UL (ref 3.8–5.2)
WBC # BLD AUTO: 5.6 10E3/UL (ref 4–11)

## 2024-03-05 PROCEDURE — 85027 COMPLETE CBC AUTOMATED: CPT

## 2024-03-05 PROCEDURE — G0463 HOSPITAL OUTPT CLINIC VISIT: HCPCS | Performed by: OBSTETRICS & GYNECOLOGY

## 2024-03-05 PROCEDURE — 36415 COLL VENOUS BLD VENIPUNCTURE: CPT

## 2024-03-05 PROCEDURE — 99207 PR PRENATAL VISIT: CPT | Mod: GC | Performed by: OBSTETRICS & GYNECOLOGY

## 2024-03-05 RX ORDER — FERROUS SULFATE 325(65) MG
325 TABLET ORAL
Qty: 90 TABLET | Refills: 3 | Status: SHIPPED | OUTPATIENT
Start: 2024-03-05 | End: 2024-07-08

## 2024-03-05 RX ORDER — PRENATAL VIT/IRON FUM/FOLIC AC 27MG-0.8MG
1 TABLET ORAL DAILY
Qty: 90 TABLET | Refills: 3 | Status: SHIPPED | OUTPATIENT
Start: 2024-03-05

## 2024-03-05 RX ORDER — ONDANSETRON 4 MG/1
4 TABLET, ORALLY DISINTEGRATING ORAL EVERY 8 HOURS PRN
Qty: 30 TABLET | Refills: 1 | Status: SHIPPED | OUTPATIENT
Start: 2024-03-05 | End: 2024-03-19

## 2024-03-05 NOTE — PROGRESS NOTES
"[facility]  Return OB Visit    Visit was conducted using [employer]  via ipad.     S: Denies vaginal bleeding, vaginal discharge, LOF, contractions.  Reports good fetal movement. No vision changes, RUQ pain, chest pain, SOB. Endorses taking iron as prescribed. Has been having some difficulty falling asleep at night. She naps during the day, but is unable to fall asleep at night.    Pregnancy notable for  - dichorionic, diamniotic twins  - FGR twin 2 (4%ile, AC<1%ile)     O: /76   Pulse 89   Ht 1.62 m (5' 3.78\")   Wt 71.1 kg (156 lb 12.8 oz)   LMP 2023 (Exact Date)   BMI 27.10 kg/m    Weight gain: 8.074 kg (17 lb 12.8 oz)    Gen: Well-appearing, NAD  FHR: Right Twin 132, Left Twin 126    A/P:  Xiao Dominga Vivas is a 29 year old  at 34w1d, here for return OB visit.    Routine Prenantal Care  - Rh pos, sary neg, rubella imm, other inf labs wnl  - Early GCT wnl  - ASA for preE prophylaxis  - Declined early genetic screening  - s/p Tdap    Di/Di twins  FGR & Elevated UA in Twin B  - Most recent growth done 3/4  - Twin A with normal growth, still breech. Twin B with EFW 4%ile (down from 9%ile in prior growth) and elevated umbilical artery dopplers  - Recommend delivery at 36 weeks for FGR w/elevated dopplers. Discussed recommendation with Xiao today. Reiterated ultrasound findings and explained increased risk associated with prolonging pregnancy including risk of IUFD.  She is desiring delivery at 37wks. Will continue to review recommendations, she is not ready to schedule CS at this time.   - Continue  testing as scheduled.    Anemia/gestational thrombocytopenia  - Taking ferrous sulfate 325 mg PO QD  - CBC ordered today    Sleep Disturbance  - Discussed sleep hygiene  - Unisom ordered    Headaches  - Well controlled with Tylenol and reglan as needed    Return to clinic in 1 week. Discussed return precautions  Lesley Espinoza, PGY-1    Appreciate note by Dr. Espinoza. Patient " has been seen and examined by me with the resident, agree with above note.     Mehreen Wynne MD

## 2024-03-12 ENCOUNTER — PRENATAL OFFICE VISIT (OUTPATIENT)
Dept: OBGYN | Facility: CLINIC | Age: 29
End: 2024-03-12
Attending: OBSTETRICS & GYNECOLOGY
Payer: COMMERCIAL

## 2024-03-12 ENCOUNTER — LAB (OUTPATIENT)
Dept: LAB | Facility: CLINIC | Age: 29
End: 2024-03-12
Attending: OBSTETRICS & GYNECOLOGY
Payer: COMMERCIAL

## 2024-03-12 VITALS
HEIGHT: 64 IN | DIASTOLIC BLOOD PRESSURE: 80 MMHG | SYSTOLIC BLOOD PRESSURE: 120 MMHG | HEART RATE: 91 BPM | BODY MASS INDEX: 26.82 KG/M2 | WEIGHT: 157.1 LBS

## 2024-03-12 DIAGNOSIS — D69.6 BENIGN GESTATIONAL THROMBOCYTOPENIA IN THIRD TRIMESTER (H): ICD-10-CM

## 2024-03-12 DIAGNOSIS — O99.113 BENIGN GESTATIONAL THROMBOCYTOPENIA IN THIRD TRIMESTER (H): ICD-10-CM

## 2024-03-12 DIAGNOSIS — O09.93 HIGH-RISK PREGNANCY IN THIRD TRIMESTER: Primary | ICD-10-CM

## 2024-03-12 LAB
ERYTHROCYTE [DISTWIDTH] IN BLOOD BY AUTOMATED COUNT: 15.9 % (ref 10–15)
HCT VFR BLD AUTO: 33.7 % (ref 35–47)
HGB BLD-MCNC: 10.6 G/DL (ref 11.7–15.7)
MCH RBC QN AUTO: 27.8 PG (ref 26.5–33)
MCHC RBC AUTO-ENTMCNC: 31.5 G/DL (ref 31.5–36.5)
MCV RBC AUTO: 89 FL (ref 78–100)
PLATELET # BLD AUTO: 112 10E3/UL (ref 150–450)
RBC # BLD AUTO: 3.81 10E6/UL (ref 3.8–5.2)
WBC # BLD AUTO: 6.9 10E3/UL (ref 4–11)

## 2024-03-12 PROCEDURE — 36415 COLL VENOUS BLD VENIPUNCTURE: CPT

## 2024-03-12 PROCEDURE — G0463 HOSPITAL OUTPT CLINIC VISIT: HCPCS | Performed by: OBSTETRICS & GYNECOLOGY

## 2024-03-12 PROCEDURE — 85027 COMPLETE CBC AUTOMATED: CPT

## 2024-03-12 PROCEDURE — 99207 PR PRENATAL VISIT: CPT | Performed by: OBSTETRICS & GYNECOLOGY

## 2024-03-12 NOTE — PROGRESS NOTES
"[facility]    Visit conducted using Reading Room  on iPad    S: Doing well. Good movement x 2. Denies contraction, leaking fluid, vaginal bleeding or headache. Has questions about delivery timing. Is hoping to deliver at or after 37wks.     O: /80   Pulse 91   Ht 1.62 m (5' 3.78\")   Wt 71.3 kg (157 lb 1.6 oz)   LMP 2023 (Exact Date)   BMI 27.15 kg/m      A/P: 29 year old  @35w1d HRP  Di/Di twins: Growth restriction twin 2 with elevated dopplers and breech twin 1.  Reviewed recommendation for delivery early 36wks and by 37 wks, however she is not ready to schedule CS at this time. Discussed concern about growth restriction today, she would like to follow up tomorrow after her US to have more information. Is nervous about delivery  and risk of NICU admission. Could consider BMZ if delivering before 37wks. Will follow up after US tomorrow for delivery planning.   GTP: Platelets last week 118. Discussed monitoring platelets and possible need for general anesthesia if platelets dropped <80 at time of delivery. Will continue to monitor.   Labor precautions reviewed.   Will call tomorrow after US with delivery plan, possible visit next week vs delivery.     Mehreen Wynne MD      "

## 2024-03-12 NOTE — PATIENT INSTRUCTIONS
Thank you for trusting us with your care!     If you need to contact us for questions about:  Symptoms, Scheduling & Medical Questions; Non-urgent (2-3 day response) Jesus message, Urgent (needing response today) [phone] (if after 3:30pm next day response)   Prescriptions: Please call your Pharmacy   Billing: Rocky [phone] or [facility]:[phone]

## 2024-03-12 NOTE — LETTER
[facility] [place]  [address] [facility] [address] [facility] [address]  [address]  [facility] [id number] [phone]          March 12, 2024    RE:  Xiao Vivas                                                                                                                                                       [address]    [facility] [place] [address]            To whom it may concern:    Xiao Vivas is under my professional care for her pregnancy. Due to her twin pregnancy and concerns about fetal growth we are recommending an early delivery. Please allow her , Janett Finn to travel to the [place] to be at the birth and help her postpartum.           Sincerely,        Mehreen Wynne MD

## 2024-03-18 NOTE — PATIENT INSTRUCTIONS
"Week 37 of Your Pregnancy: Care Instructions    Most babies are born between 37 and 40 weeks.    This is a good time to pack a bag to take with you to the birth. Then it will be ready to go when you are.    Learn about breastfeeding.  For example, find out about ways to hold your baby to make breastfeeding easier. And think about learning how to pump and store milk.     Know that crying is normal.  It's common for babies to cry 1 to 3 hours a day. Some cry more, and some cry less.     Learn why babies cry.  They may be hungry; have gas; need a diaper change; or feel cold, warm, tired, lonely, or tense. Sometimes they cry for unknown reasons.     Think about what will help you stay calm when your baby cries.  Taking slow, deep breaths can help. So can taking a break. It's okay to put your baby somewhere safe (like their crib) and walk away for a few minutes.     Learn about safe sleep for your baby.  Always put your baby to sleep on their back. Place them alone in a crib or bassinet with a firm, flat surface. Keep soft items like stuffed animals out of the crib.     Learn what to expect with  poop.  Your baby will have their own bowel patterns. Some babies have several bowel movements a day. Some have fewer.     Know that  babies will often have loose, yellow bowel movements.  Formula-fed babies have more formed stools. If your baby's poop looks like pellets, your baby is constipated.   Follow-up care is a key part of your treatment and safety. Be sure to make and go to all appointments, and call your doctor if you are having problems. It's also a good idea to know your test results and keep a list of the medicines you take.  Where can you learn more?  Go to https://www.Splash[employer].net/patiented  Enter N257 in the search box to learn more about \"Week 37 of Your Pregnancy: Care Instructions.\"  Current as of: July 10, 2023               Content Version: 14.0    8281-4340 [employer].   Care " instructions adapted under license by your healthcare professional. If you have questions about a medical condition or this instruction, always ask your healthcare professional. [employer] disclaims any warranty or liability for your use of this information.

## 2024-03-19 ENCOUNTER — HOSPITAL ENCOUNTER (OUTPATIENT)
Dept: ULTRASOUND IMAGING | Facility: CLINIC | Age: 29
Discharge: HOME | End: 2024-03-19
Attending: OBSTETRICS & GYNECOLOGY
Payer: COMMERCIAL

## 2024-03-19 ENCOUNTER — PRENATAL OFFICE VISIT (OUTPATIENT)
Dept: OBGYN | Facility: CLINIC | Age: 29
End: 2024-03-19
Attending: OBSTETRICS & GYNECOLOGY
Payer: COMMERCIAL

## 2024-03-19 ENCOUNTER — OFFICE VISIT (OUTPATIENT)
Dept: MATERNAL FETAL MEDICINE | Facility: CLINIC | Age: 29
End: 2024-03-19
Attending: OBSTETRICS & GYNECOLOGY
Payer: COMMERCIAL

## 2024-03-19 VITALS
DIASTOLIC BLOOD PRESSURE: 81 MMHG | HEART RATE: 88 BPM | SYSTOLIC BLOOD PRESSURE: 113 MMHG | BODY MASS INDEX: 26.77 KG/M2 | WEIGHT: 154.9 LBS

## 2024-03-19 VITALS — DIASTOLIC BLOOD PRESSURE: 76 MMHG | HEART RATE: 71 BPM | SYSTOLIC BLOOD PRESSURE: 115 MMHG

## 2024-03-19 DIAGNOSIS — O09.93 HIGH-RISK PREGNANCY IN THIRD TRIMESTER: Primary | ICD-10-CM

## 2024-03-19 DIAGNOSIS — B37.31 YEAST INFECTION OF THE VAGINA: ICD-10-CM

## 2024-03-19 DIAGNOSIS — O36.5932 INTRAUTERINE GROWTH RESTRICTION AFFECTING ANTEPARTUM CARE OF MOTHER, THIRD TRIMESTER, FETUS 2: ICD-10-CM

## 2024-03-19 DIAGNOSIS — O26.893 HEARTBURN DURING PREGNANCY IN THIRD TRIMESTER: ICD-10-CM

## 2024-03-19 DIAGNOSIS — R12 HEARTBURN DURING PREGNANCY IN THIRD TRIMESTER: ICD-10-CM

## 2024-03-19 PROCEDURE — 76815 OB US LIMITED FETUS(S): CPT | Mod: 26 | Performed by: OBSTETRICS & GYNECOLOGY

## 2024-03-19 PROCEDURE — 59025 FETAL NON-STRESS TEST: CPT | Mod: 59

## 2024-03-19 PROCEDURE — 87653 STREP B DNA AMP PROBE: CPT | Performed by: OBSTETRICS & GYNECOLOGY

## 2024-03-19 PROCEDURE — G0463 HOSPITAL OUTPT CLINIC VISIT: HCPCS | Mod: 25 | Performed by: OBSTETRICS & GYNECOLOGY

## 2024-03-19 PROCEDURE — 76815 OB US LIMITED FETUS(S): CPT

## 2024-03-19 PROCEDURE — 76820 UMBILICAL ARTERY ECHO: CPT | Mod: 26 | Performed by: OBSTETRICS & GYNECOLOGY

## 2024-03-19 PROCEDURE — 59025 FETAL NON-STRESS TEST: CPT | Mod: 26 | Performed by: OBSTETRICS & GYNECOLOGY

## 2024-03-19 PROCEDURE — 99213 OFFICE O/P EST LOW 20 MIN: CPT | Performed by: OBSTETRICS & GYNECOLOGY

## 2024-03-19 PROCEDURE — 59025 FETAL NON-STRESS TEST: CPT

## 2024-03-19 PROCEDURE — 76820 UMBILICAL ARTERY ECHO: CPT | Mod: 59

## 2024-03-19 PROCEDURE — 99207 PR PRENATAL VISIT: CPT | Performed by: OBSTETRICS & GYNECOLOGY

## 2024-03-19 RX ORDER — FAMOTIDINE 20 MG/1
20 TABLET, FILM COATED ORAL 2 TIMES DAILY
Qty: 30 TABLET | Refills: 1 | Status: ON HOLD | OUTPATIENT
Start: 2024-03-19 | End: 2024-03-30

## 2024-03-19 RX ORDER — FLUCONAZOLE 150 MG/1
150 TABLET ORAL ONCE
Qty: 1 TABLET | Refills: 0 | Status: SHIPPED | OUTPATIENT
Start: 2024-03-19 | End: 2024-03-19

## 2024-03-19 ASSESSMENT — PAIN SCALES - GENERAL: PAINLEVEL: NO PAIN (0)

## 2024-03-19 NOTE — PROGRESS NOTES
Patient reports + fetal movement, no pain, + contractions every 2-5 minutes, mild, patient feeling slight tightening in lower abdomen - see NST flowsheet, no leaking of fluid, or bleeding. SBAR given to NAVEED LEVI, see their note in Epic.

## 2024-03-19 NOTE — PROGRESS NOTES
CRYSTAL    S: Feeling good movement x 2. No ctx, lof, vb. Some heartburn, not taking anything. Vaginal itching. Was unable to go to US last week due to childcare needs.     O: /81   Pulse 88   Wt 70.3 kg (154 lb 14.4 oz)   LMP 2023 (Exact Date)   BMI 26.77 kg/m    Wet prep: +yeast  GBS collected    A/P: 29 year old  @36w1d HRP  Di/Di twins: FGR twin 2 with elevated dopplers, delivery recommended at 36wks by MFM. Patient has declined scheduling delivery at this time. Reviewed risk of IUFD or HIE with patient. She requests to wait until after US today to schedule. She did confirm that she will be going to appointment today. Reviewed kick counting while awaiting delivery.   Breech twin 1: Discussed CS for delivery, she is in agreement.   GTP: Last plt count 112, understands possibility of general anesthesia if day of surgery plt count low.   Yeast vaginitis: Rx diflucan, prefers over topical- reviewed small risk in pregnancy but overall safety with single dose.   Will call patient following MFM US to further discuss delivery timing.     Mehreen Wynne MD    Visit conducted using [email or web address]  on iPad.

## 2024-03-20 LAB — GP B STREP DNA SPEC QL NAA+PROBE: NEGATIVE

## 2024-03-27 ENCOUNTER — OFFICE VISIT (OUTPATIENT)
Dept: MATERNAL FETAL MEDICINE | Facility: CLINIC | Age: 29
End: 2024-03-27
Attending: OBSTETRICS & GYNECOLOGY
Payer: COMMERCIAL

## 2024-03-27 ENCOUNTER — TELEPHONE (OUTPATIENT)
Dept: OBGYN | Facility: CLINIC | Age: 29
End: 2024-03-27
Payer: COMMERCIAL

## 2024-03-27 ENCOUNTER — ANESTHESIA EVENT (OUTPATIENT)
Dept: OBGYN | Facility: CLINIC | Age: 29
End: 2024-03-27
Payer: COMMERCIAL

## 2024-03-27 ENCOUNTER — ANESTHESIA (OUTPATIENT)
Dept: OBGYN | Facility: CLINIC | Age: 29
End: 2024-03-27
Payer: COMMERCIAL

## 2024-03-27 ENCOUNTER — HOSPITAL ENCOUNTER (INPATIENT)
Facility: CLINIC | Age: 29
LOS: 3 days | Discharge: HOME | End: 2024-03-30
Attending: OBSTETRICS & GYNECOLOGY | Admitting: OBSTETRICS & GYNECOLOGY
Payer: COMMERCIAL

## 2024-03-27 ENCOUNTER — HOSPITAL ENCOUNTER (OUTPATIENT)
Dept: ULTRASOUND IMAGING | Facility: CLINIC | Age: 29
Discharge: HOME | End: 2024-03-27
Attending: OBSTETRICS & GYNECOLOGY
Payer: COMMERCIAL

## 2024-03-27 VITALS — SYSTOLIC BLOOD PRESSURE: 111 MMHG | DIASTOLIC BLOOD PRESSURE: 76 MMHG

## 2024-03-27 DIAGNOSIS — O36.5932 INTRAUTERINE GROWTH RESTRICTION AFFECTING ANTEPARTUM CARE OF MOTHER, THIRD TRIMESTER, FETUS 2: ICD-10-CM

## 2024-03-27 DIAGNOSIS — Z98.891 S/P CESAREAN SECTION: Primary | ICD-10-CM

## 2024-03-27 DIAGNOSIS — O30.043 DICHORIONIC DIAMNIOTIC TWIN PREGNANCY IN THIRD TRIMESTER: Primary | ICD-10-CM

## 2024-03-27 PROBLEM — O30.049 DICHORIONIC DIAMNIOTIC TWIN PREGNANCY: Status: ACTIVE | Noted: 2024-03-27

## 2024-03-27 LAB
ABO/RH(D): NORMAL
ANTIBODY SCREEN: NEGATIVE
ERYTHROCYTE [DISTWIDTH] IN BLOOD BY AUTOMATED COUNT: 16.1 % (ref 10–15)
ERYTHROCYTE [DISTWIDTH] IN BLOOD BY AUTOMATED COUNT: 16.3 % (ref 10–15)
HCT VFR BLD AUTO: 33.9 % (ref 35–47)
HCT VFR BLD AUTO: 34.1 % (ref 35–47)
HGB BLD-MCNC: 10.6 G/DL (ref 11.7–15.7)
HGB BLD-MCNC: 11 G/DL (ref 11.7–15.7)
MCH RBC QN AUTO: 27.8 PG (ref 26.5–33)
MCH RBC QN AUTO: 28.2 PG (ref 26.5–33)
MCHC RBC AUTO-ENTMCNC: 31.1 G/DL (ref 31.5–36.5)
MCHC RBC AUTO-ENTMCNC: 32.4 G/DL (ref 31.5–36.5)
MCV RBC AUTO: 87 FL (ref 78–100)
MCV RBC AUTO: 90 FL (ref 78–100)
PLATELET # BLD AUTO: 116 10E3/UL (ref 150–450)
PLATELET # BLD AUTO: 116 10E3/UL (ref 150–450)
RBC # BLD AUTO: 3.81 10E6/UL (ref 3.8–5.2)
RBC # BLD AUTO: 3.9 10E6/UL (ref 3.8–5.2)
SPECIMEN EXPIRATION DATE: NORMAL
T PALLIDUM AB SER QL: NONREACTIVE
WBC # BLD AUTO: 5.7 10E3/UL (ref 4–11)
WBC # BLD AUTO: 7.6 10E3/UL (ref 4–11)

## 2024-03-27 PROCEDURE — 76820 UMBILICAL ARTERY ECHO: CPT | Mod: 59

## 2024-03-27 PROCEDURE — 99213 OFFICE O/P EST LOW 20 MIN: CPT | Mod: 25 | Performed by: OBSTETRICS & GYNECOLOGY

## 2024-03-27 PROCEDURE — 258N000003 HC RX IP 258 OP 636: Performed by: STUDENT IN AN ORGANIZED HEALTH CARE EDUCATION/TRAINING PROGRAM

## 2024-03-27 PROCEDURE — 82565 ASSAY OF CREATININE: CPT | Performed by: OBSTETRICS & GYNECOLOGY

## 2024-03-27 PROCEDURE — 272N000001 HC OR GENERAL SUPPLY STERILE: Performed by: OBSTETRICS & GYNECOLOGY

## 2024-03-27 PROCEDURE — C9290 INJ, BUPIVACAINE LIPOSOME: HCPCS | Performed by: ANESTHESIOLOGY

## 2024-03-27 PROCEDURE — 250N000009 HC RX 250: Performed by: STUDENT IN AN ORGANIZED HEALTH CARE EDUCATION/TRAINING PROGRAM

## 2024-03-27 PROCEDURE — 999N000141 HC STATISTIC PRE-PROCEDURE NURSING ASSESSMENT: Performed by: OBSTETRICS & GYNECOLOGY

## 2024-03-27 PROCEDURE — 999N000016 HC STATISTIC ATTENDANCE AT DELIVERY

## 2024-03-27 PROCEDURE — 86900 BLOOD TYPING SEROLOGIC ABO: CPT

## 2024-03-27 PROCEDURE — 250N000011 HC RX IP 250 OP 636: Performed by: STUDENT IN AN ORGANIZED HEALTH CARE EDUCATION/TRAINING PROGRAM

## 2024-03-27 PROCEDURE — 250N000011 HC RX IP 250 OP 636

## 2024-03-27 PROCEDURE — 360N000076 HC SURGERY LEVEL 3, PER MIN: Performed by: OBSTETRICS & GYNECOLOGY

## 2024-03-27 PROCEDURE — 85014 HEMATOCRIT: CPT

## 2024-03-27 PROCEDURE — 250N000011 HC RX IP 250 OP 636: Performed by: OBSTETRICS & GYNECOLOGY

## 2024-03-27 PROCEDURE — 250N000009 HC RX 250

## 2024-03-27 PROCEDURE — 59510 CESAREAN DELIVERY: CPT | Performed by: ANESTHESIOLOGY

## 2024-03-27 PROCEDURE — 120N000002 HC R&B MED SURG/OB UMMC

## 2024-03-27 PROCEDURE — 85027 COMPLETE CBC AUTOMATED: CPT | Performed by: OBSTETRICS & GYNECOLOGY

## 2024-03-27 PROCEDURE — 59025 FETAL NON-STRESS TEST: CPT | Mod: 26 | Performed by: OBSTETRICS & GYNECOLOGY

## 2024-03-27 PROCEDURE — 250N000011 HC RX IP 250 OP 636: Mod: JZ

## 2024-03-27 PROCEDURE — 88307 TISSUE EXAM BY PATHOLOGIST: CPT | Mod: 26 | Performed by: PATHOLOGY

## 2024-03-27 PROCEDURE — 710N000010 HC RECOVERY PHASE 1, LEVEL 2, PER MIN: Performed by: OBSTETRICS & GYNECOLOGY

## 2024-03-27 PROCEDURE — 250N000013 HC RX MED GY IP 250 OP 250 PS 637

## 2024-03-27 PROCEDURE — 76820 UMBILICAL ARTERY ECHO: CPT | Mod: 26 | Performed by: OBSTETRICS & GYNECOLOGY

## 2024-03-27 PROCEDURE — 96372 THER/PROPH/DIAG INJ SC/IM: CPT

## 2024-03-27 PROCEDURE — 271N000001 HC OR GENERAL SUPPLY NON-STERILE: Performed by: OBSTETRICS & GYNECOLOGY

## 2024-03-27 PROCEDURE — 76816 OB US FOLLOW-UP PER FETUS: CPT | Mod: 59

## 2024-03-27 PROCEDURE — 76816 OB US FOLLOW-UP PER FETUS: CPT | Mod: 26 | Performed by: OBSTETRICS & GYNECOLOGY

## 2024-03-27 PROCEDURE — 250N000009 HC RX 250: Performed by: OBSTETRICS & GYNECOLOGY

## 2024-03-27 PROCEDURE — 86780 TREPONEMA PALLIDUM: CPT

## 2024-03-27 PROCEDURE — 250N000011 HC RX IP 250 OP 636: Performed by: ANESTHESIOLOGY

## 2024-03-27 PROCEDURE — 59510 CESAREAN DELIVERY: CPT | Mod: GC | Performed by: OBSTETRICS & GYNECOLOGY

## 2024-03-27 PROCEDURE — 59025 FETAL NON-STRESS TEST: CPT | Mod: 59

## 2024-03-27 PROCEDURE — 59025 FETAL NON-STRESS TEST: CPT

## 2024-03-27 PROCEDURE — 370N000017 HC ANESTHESIA TECHNICAL FEE, PER MIN: Performed by: OBSTETRICS & GYNECOLOGY

## 2024-03-27 PROCEDURE — 88307 TISSUE EXAM BY PATHOLOGIST: CPT | Mod: TC

## 2024-03-27 PROCEDURE — 258N000003 HC RX IP 258 OP 636

## 2024-03-27 RX ORDER — NALOXONE HYDROCHLORIDE 0.4 MG/ML
0.2 INJECTION, SOLUTION INTRAMUSCULAR; INTRAVENOUS; SUBCUTANEOUS
Status: DISCONTINUED | OUTPATIENT
Start: 2024-03-27 | End: 2024-03-30 | Stop reason: HOSPADM

## 2024-03-27 RX ORDER — AMOXICILLIN 250 MG
2 CAPSULE ORAL 2 TIMES DAILY
Status: DISCONTINUED | OUTPATIENT
Start: 2024-03-27 | End: 2024-03-30 | Stop reason: HOSPADM

## 2024-03-27 RX ORDER — SODIUM CHLORIDE, SODIUM LACTATE, POTASSIUM CHLORIDE, CALCIUM CHLORIDE 600; 310; 30; 20 MG/100ML; MG/100ML; MG/100ML; MG/100ML
INJECTION, SOLUTION INTRAVENOUS CONTINUOUS
Status: CANCELLED | OUTPATIENT
Start: 2024-03-27

## 2024-03-27 RX ORDER — IBUPROFEN 800 MG/1
800 TABLET, FILM COATED ORAL EVERY 6 HOURS
Status: DISCONTINUED | OUTPATIENT
Start: 2024-03-28 | End: 2024-03-30 | Stop reason: HOSPADM

## 2024-03-27 RX ORDER — NALOXONE HYDROCHLORIDE 0.4 MG/ML
0.2 INJECTION, SOLUTION INTRAMUSCULAR; INTRAVENOUS; SUBCUTANEOUS
Status: CANCELLED | OUTPATIENT
Start: 2024-03-27

## 2024-03-27 RX ORDER — PROCHLORPERAZINE MALEATE 10 MG
10 TABLET ORAL EVERY 6 HOURS PRN
Status: DISCONTINUED | OUTPATIENT
Start: 2024-03-27 | End: 2024-03-30 | Stop reason: HOSPADM

## 2024-03-27 RX ORDER — SODIUM CHLORIDE, SODIUM LACTATE, POTASSIUM CHLORIDE, CALCIUM CHLORIDE 600; 310; 30; 20 MG/100ML; MG/100ML; MG/100ML; MG/100ML
INJECTION, SOLUTION INTRAVENOUS CONTINUOUS
Status: DISCONTINUED | OUTPATIENT
Start: 2024-03-27 | End: 2024-03-27 | Stop reason: HOSPADM

## 2024-03-27 RX ORDER — MEPERIDINE HYDROCHLORIDE 25 MG/ML
12.5 INJECTION INTRAMUSCULAR; INTRAVENOUS; SUBCUTANEOUS EVERY 5 MIN PRN
Status: CANCELLED | OUTPATIENT
Start: 2024-03-27

## 2024-03-27 RX ORDER — OXYTOCIN 10 [USP'U]/ML
10 INJECTION, SOLUTION INTRAMUSCULAR; INTRAVENOUS
Status: DISCONTINUED | OUTPATIENT
Start: 2024-03-27 | End: 2024-03-27

## 2024-03-27 RX ORDER — BISACODYL 10 MG
10 SUPPOSITORY, RECTAL RECTAL DAILY PRN
Status: DISCONTINUED | OUTPATIENT
Start: 2024-03-29 | End: 2024-03-30 | Stop reason: HOSPADM

## 2024-03-27 RX ORDER — AMOXICILLIN 250 MG
1 CAPSULE ORAL 2 TIMES DAILY
Status: DISCONTINUED | OUTPATIENT
Start: 2024-03-27 | End: 2024-03-30 | Stop reason: HOSPADM

## 2024-03-27 RX ORDER — ONDANSETRON 4 MG/1
4 TABLET, ORALLY DISINTEGRATING ORAL EVERY 6 HOURS PRN
Status: DISCONTINUED | OUTPATIENT
Start: 2024-03-27 | End: 2024-03-30 | Stop reason: HOSPADM

## 2024-03-27 RX ORDER — HYDROCORTISONE 25 MG/G
CREAM TOPICAL 3 TIMES DAILY PRN
Status: DISCONTINUED | OUTPATIENT
Start: 2024-03-27 | End: 2024-03-30 | Stop reason: HOSPADM

## 2024-03-27 RX ORDER — SIMETHICONE 80 MG
80 TABLET,CHEWABLE ORAL 4 TIMES DAILY PRN
Status: DISCONTINUED | OUTPATIENT
Start: 2024-03-27 | End: 2024-03-29

## 2024-03-27 RX ORDER — DIMENHYDRINATE 50 MG/ML
25 INJECTION, SOLUTION INTRAMUSCULAR; INTRAVENOUS
Status: CANCELLED | OUTPATIENT
Start: 2024-03-27

## 2024-03-27 RX ORDER — MISOPROSTOL 200 UG/1
800 TABLET ORAL
Status: DISCONTINUED | OUTPATIENT
Start: 2024-03-27 | End: 2024-03-27 | Stop reason: HOSPADM

## 2024-03-27 RX ORDER — AZITHROMYCIN 500 MG/5ML
500 INJECTION, POWDER, LYOPHILIZED, FOR SOLUTION INTRAVENOUS ONCE
Status: COMPLETED | OUTPATIENT
Start: 2024-03-27 | End: 2024-03-27

## 2024-03-27 RX ORDER — FENTANYL CITRATE 50 UG/ML
INJECTION, SOLUTION INTRAMUSCULAR; INTRAVENOUS
Status: COMPLETED | OUTPATIENT
Start: 2024-03-27 | End: 2024-03-27

## 2024-03-27 RX ORDER — CARBOPROST TROMETHAMINE 250 UG/ML
250 INJECTION, SOLUTION INTRAMUSCULAR
Status: DISCONTINUED | OUTPATIENT
Start: 2024-03-27 | End: 2024-03-30 | Stop reason: HOSPADM

## 2024-03-27 RX ORDER — MISOPROSTOL 200 UG/1
400 TABLET ORAL
Status: DISCONTINUED | OUTPATIENT
Start: 2024-03-27 | End: 2024-03-30 | Stop reason: HOSPADM

## 2024-03-27 RX ORDER — FLUMAZENIL 0.1 MG/ML
0.2 INJECTION, SOLUTION INTRAVENOUS
Status: CANCELLED | OUTPATIENT
Start: 2024-03-27

## 2024-03-27 RX ORDER — NALOXONE HYDROCHLORIDE 0.4 MG/ML
0.4 INJECTION, SOLUTION INTRAMUSCULAR; INTRAVENOUS; SUBCUTANEOUS
Status: DISCONTINUED | OUTPATIENT
Start: 2024-03-27 | End: 2024-03-30 | Stop reason: HOSPADM

## 2024-03-27 RX ORDER — BUPIVACAINE HYDROCHLORIDE 2.5 MG/ML
INJECTION, SOLUTION EPIDURAL; INFILTRATION; INTRACAUDAL
Status: COMPLETED | OUTPATIENT
Start: 2024-03-27 | End: 2024-03-27

## 2024-03-27 RX ORDER — LIDOCAINE 40 MG/G
CREAM TOPICAL
Status: DISCONTINUED | OUTPATIENT
Start: 2024-03-27 | End: 2024-03-27 | Stop reason: HOSPADM

## 2024-03-27 RX ORDER — METHYLERGONOVINE MALEATE 0.2 MG/ML
200 INJECTION INTRAVENOUS
Status: DISCONTINUED | OUTPATIENT
Start: 2024-03-27 | End: 2024-03-30 | Stop reason: HOSPADM

## 2024-03-27 RX ORDER — MAGNESIUM HYDROXIDE 1200 MG/15ML
LIQUID ORAL PRN
Status: DISCONTINUED | OUTPATIENT
Start: 2024-03-27 | End: 2024-03-30

## 2024-03-27 RX ORDER — OXYTOCIN/0.9 % SODIUM CHLORIDE 30/500 ML
340 PLASTIC BAG, INJECTION (ML) INTRAVENOUS CONTINUOUS PRN
Status: DISCONTINUED | OUTPATIENT
Start: 2024-03-27 | End: 2024-03-27 | Stop reason: HOSPADM

## 2024-03-27 RX ORDER — OXYTOCIN/0.9 % SODIUM CHLORIDE 30/500 ML
PLASTIC BAG, INJECTION (ML) INTRAVENOUS CONTINUOUS PRN
Status: DISCONTINUED | OUTPATIENT
Start: 2024-03-27 | End: 2024-03-27

## 2024-03-27 RX ORDER — LABETALOL HYDROCHLORIDE 5 MG/ML
10 INJECTION, SOLUTION INTRAVENOUS
Status: CANCELLED | OUTPATIENT
Start: 2024-03-27

## 2024-03-27 RX ORDER — MISOPROSTOL 200 UG/1
800 TABLET ORAL
Status: DISCONTINUED | OUTPATIENT
Start: 2024-03-27 | End: 2024-03-30 | Stop reason: HOSPADM

## 2024-03-27 RX ORDER — CARBOPROST TROMETHAMINE 250 UG/ML
250 INJECTION, SOLUTION INTRAMUSCULAR
Status: DISCONTINUED | OUTPATIENT
Start: 2024-03-27 | End: 2024-03-27 | Stop reason: HOSPADM

## 2024-03-27 RX ORDER — SODIUM CHLORIDE, SODIUM LACTATE, POTASSIUM CHLORIDE, CALCIUM CHLORIDE 600; 310; 30; 20 MG/100ML; MG/100ML; MG/100ML; MG/100ML
INJECTION, SOLUTION INTRAVENOUS CONTINUOUS PRN
Status: DISCONTINUED | OUTPATIENT
Start: 2024-03-27 | End: 2024-03-27

## 2024-03-27 RX ORDER — DEXTROSE, SODIUM CHLORIDE, SODIUM LACTATE, POTASSIUM CHLORIDE, AND CALCIUM CHLORIDE 5; .6; .31; .03; .02 G/100ML; G/100ML; G/100ML; G/100ML; G/100ML
INJECTION, SOLUTION INTRAVENOUS CONTINUOUS
Status: DISCONTINUED | OUTPATIENT
Start: 2024-03-27 | End: 2024-03-30 | Stop reason: HOSPADM

## 2024-03-27 RX ORDER — MISOPROSTOL 200 UG/1
400 TABLET ORAL
Status: DISCONTINUED | OUTPATIENT
Start: 2024-03-27 | End: 2024-03-27 | Stop reason: HOSPADM

## 2024-03-27 RX ORDER — LIDOCAINE 40 MG/G
CREAM TOPICAL
Status: DISCONTINUED | OUTPATIENT
Start: 2024-03-27 | End: 2024-03-30 | Stop reason: HOSPADM

## 2024-03-27 RX ORDER — CEFAZOLIN SODIUM/WATER 2 G/20 ML
2 SYRINGE (ML) INTRAVENOUS
Status: COMPLETED | OUTPATIENT
Start: 2024-03-27 | End: 2024-03-27

## 2024-03-27 RX ORDER — LOPERAMIDE HCL 2 MG
2 CAPSULE ORAL
Status: DISCONTINUED | OUTPATIENT
Start: 2024-03-27 | End: 2024-03-30 | Stop reason: HOSPADM

## 2024-03-27 RX ORDER — OXYCODONE HYDROCHLORIDE 5 MG/1
5 TABLET ORAL EVERY 4 HOURS PRN
Status: DISCONTINUED | OUTPATIENT
Start: 2024-03-27 | End: 2024-03-30 | Stop reason: HOSPADM

## 2024-03-27 RX ORDER — ONDANSETRON 2 MG/ML
INJECTION INTRAMUSCULAR; INTRAVENOUS PRN
Status: DISCONTINUED | OUTPATIENT
Start: 2024-03-27 | End: 2024-03-27

## 2024-03-27 RX ORDER — KETOROLAC TROMETHAMINE 30 MG/ML
30 INJECTION, SOLUTION INTRAMUSCULAR; INTRAVENOUS EVERY 6 HOURS
Status: COMPLETED | OUTPATIENT
Start: 2024-03-27 | End: 2024-03-28

## 2024-03-27 RX ORDER — NALOXONE HYDROCHLORIDE 0.4 MG/ML
0.4 INJECTION, SOLUTION INTRAMUSCULAR; INTRAVENOUS; SUBCUTANEOUS
Status: CANCELLED | OUTPATIENT
Start: 2024-03-27

## 2024-03-27 RX ORDER — MORPHINE SULFATE 1 MG/ML
INJECTION, SOLUTION EPIDURAL; INTRATHECAL; INTRAVENOUS
Status: COMPLETED | OUTPATIENT
Start: 2024-03-27 | End: 2024-03-27

## 2024-03-27 RX ORDER — BUPIVACAINE HYDROCHLORIDE 7.5 MG/ML
INJECTION, SOLUTION INTRASPINAL
Status: COMPLETED | OUTPATIENT
Start: 2024-03-27 | End: 2024-03-27

## 2024-03-27 RX ORDER — OXYTOCIN/0.9 % SODIUM CHLORIDE 30/500 ML
100-340 PLASTIC BAG, INJECTION (ML) INTRAVENOUS CONTINUOUS PRN
Status: DISCONTINUED | OUTPATIENT
Start: 2024-03-27 | End: 2024-03-27

## 2024-03-27 RX ORDER — FENTANYL CITRATE 50 UG/ML
25-50 INJECTION, SOLUTION INTRAMUSCULAR; INTRAVENOUS
Status: CANCELLED | OUTPATIENT
Start: 2024-03-27

## 2024-03-27 RX ORDER — METHYLERGONOVINE MALEATE 0.2 MG/ML
200 INJECTION INTRAVENOUS
Status: DISCONTINUED | OUTPATIENT
Start: 2024-03-27 | End: 2024-03-27 | Stop reason: HOSPADM

## 2024-03-27 RX ORDER — OXYTOCIN 10 [USP'U]/ML
10 INJECTION, SOLUTION INTRAMUSCULAR; INTRAVENOUS
Status: DISCONTINUED | OUTPATIENT
Start: 2024-03-27 | End: 2024-03-27 | Stop reason: HOSPADM

## 2024-03-27 RX ORDER — LOPERAMIDE HCL 2 MG
2 CAPSULE ORAL
Status: DISCONTINUED | OUTPATIENT
Start: 2024-03-27 | End: 2024-03-27 | Stop reason: HOSPADM

## 2024-03-27 RX ORDER — METOCLOPRAMIDE HYDROCHLORIDE 5 MG/ML
10 INJECTION INTRAMUSCULAR; INTRAVENOUS EVERY 6 HOURS PRN
Status: DISCONTINUED | OUTPATIENT
Start: 2024-03-27 | End: 2024-03-30 | Stop reason: HOSPADM

## 2024-03-27 RX ORDER — CITRIC ACID/SODIUM CITRATE 334-500MG
30 SOLUTION, ORAL ORAL
Status: COMPLETED | OUTPATIENT
Start: 2024-03-27 | End: 2024-03-27

## 2024-03-27 RX ORDER — ONDANSETRON 2 MG/ML
4 INJECTION INTRAMUSCULAR; INTRAVENOUS EVERY 6 HOURS PRN
Status: DISCONTINUED | OUTPATIENT
Start: 2024-03-27 | End: 2024-03-30 | Stop reason: HOSPADM

## 2024-03-27 RX ORDER — LOPERAMIDE HCL 2 MG
4 CAPSULE ORAL
Status: DISCONTINUED | OUTPATIENT
Start: 2024-03-27 | End: 2024-03-27 | Stop reason: HOSPADM

## 2024-03-27 RX ORDER — PROCHLORPERAZINE 25 MG
25 SUPPOSITORY, RECTAL RECTAL EVERY 12 HOURS PRN
Status: DISCONTINUED | OUTPATIENT
Start: 2024-03-27 | End: 2024-03-30 | Stop reason: HOSPADM

## 2024-03-27 RX ORDER — NALOXONE HYDROCHLORIDE 0.4 MG/ML
0.1 INJECTION, SOLUTION INTRAMUSCULAR; INTRAVENOUS; SUBCUTANEOUS
Status: CANCELLED | OUTPATIENT
Start: 2024-03-27

## 2024-03-27 RX ORDER — ACETAMINOPHEN 325 MG/1
975 TABLET ORAL EVERY 6 HOURS
Status: DISCONTINUED | OUTPATIENT
Start: 2024-03-27 | End: 2024-03-30 | Stop reason: HOSPADM

## 2024-03-27 RX ORDER — TRANEXAMIC ACID 10 MG/ML
1 INJECTION, SOLUTION INTRAVENOUS EVERY 30 MIN PRN
Status: DISCONTINUED | OUTPATIENT
Start: 2024-03-27 | End: 2024-03-27 | Stop reason: HOSPADM

## 2024-03-27 RX ORDER — MODIFIED LANOLIN
OINTMENT (GRAM) TOPICAL
Status: DISCONTINUED | OUTPATIENT
Start: 2024-03-27 | End: 2024-03-30 | Stop reason: HOSPADM

## 2024-03-27 RX ORDER — OXYTOCIN/0.9 % SODIUM CHLORIDE 30/500 ML
340 PLASTIC BAG, INJECTION (ML) INTRAVENOUS CONTINUOUS PRN
Status: DISCONTINUED | OUTPATIENT
Start: 2024-03-27 | End: 2024-03-30 | Stop reason: HOSPADM

## 2024-03-27 RX ORDER — CEFAZOLIN SODIUM/WATER 2 G/20 ML
2 SYRINGE (ML) INTRAVENOUS SEE ADMIN INSTRUCTIONS
Status: DISCONTINUED | OUTPATIENT
Start: 2024-03-27 | End: 2024-03-27 | Stop reason: HOSPADM

## 2024-03-27 RX ORDER — TRANEXAMIC ACID 10 MG/ML
1 INJECTION, SOLUTION INTRAVENOUS EVERY 30 MIN PRN
Status: DISCONTINUED | OUTPATIENT
Start: 2024-03-27 | End: 2024-03-30 | Stop reason: HOSPADM

## 2024-03-27 RX ORDER — LOPERAMIDE HCL 2 MG
4 CAPSULE ORAL
Status: DISCONTINUED | OUTPATIENT
Start: 2024-03-27 | End: 2024-03-30 | Stop reason: HOSPADM

## 2024-03-27 RX ORDER — METOCLOPRAMIDE 10 MG/1
10 TABLET ORAL EVERY 6 HOURS PRN
Status: DISCONTINUED | OUTPATIENT
Start: 2024-03-27 | End: 2024-03-30 | Stop reason: HOSPADM

## 2024-03-27 RX ORDER — ACETAMINOPHEN 325 MG/1
975 TABLET ORAL ONCE
Status: COMPLETED | OUTPATIENT
Start: 2024-03-27 | End: 2024-03-27

## 2024-03-27 RX ORDER — OXYTOCIN 10 [USP'U]/ML
10 INJECTION, SOLUTION INTRAMUSCULAR; INTRAVENOUS
Status: DISCONTINUED | OUTPATIENT
Start: 2024-03-27 | End: 2024-03-30 | Stop reason: HOSPADM

## 2024-03-27 RX ADMIN — BUPIVACAINE HYDROCHLORIDE 20 ML: 2.5 INJECTION, SOLUTION EPIDURAL; INFILTRATION; INTRACAUDAL at 19:09

## 2024-03-27 RX ADMIN — METHYLERGONOVINE MALEATE 200 MCG: 0.2 INJECTION INTRAVENOUS at 17:52

## 2024-03-27 RX ADMIN — SODIUM CHLORIDE, POTASSIUM CHLORIDE, SODIUM LACTATE AND CALCIUM CHLORIDE: 600; 310; 30; 20 INJECTION, SOLUTION INTRAVENOUS at 16:24

## 2024-03-27 RX ADMIN — SODIUM CHLORIDE, POTASSIUM CHLORIDE, SODIUM LACTATE AND CALCIUM CHLORIDE 500 ML: 600; 310; 30; 20 INJECTION, SOLUTION INTRAVENOUS at 15:49

## 2024-03-27 RX ADMIN — BUPIVACAINE HYDROCHLORIDE IN DEXTROSE 1.6 ML: 7.5 INJECTION, SOLUTION SUBARACHNOID at 17:28

## 2024-03-27 RX ADMIN — FENTANYL CITRATE 15 MCG: 50 INJECTION INTRAMUSCULAR; INTRAVENOUS at 17:28

## 2024-03-27 RX ADMIN — ACETAMINOPHEN 975 MG: 325 TABLET, FILM COATED ORAL at 15:54

## 2024-03-27 RX ADMIN — Medication 300 ML/HR: at 18:37

## 2024-03-27 RX ADMIN — METOCLOPRAMIDE HYDROCHLORIDE 10 MG: 5 INJECTION INTRAMUSCULAR; INTRAVENOUS at 20:28

## 2024-03-27 RX ADMIN — AZITHROMYCIN MONOHYDRATE 500 MG: 500 INJECTION, POWDER, LYOPHILIZED, FOR SOLUTION INTRAVENOUS at 17:26

## 2024-03-27 RX ADMIN — SODIUM CHLORIDE, POTASSIUM CHLORIDE, SODIUM LACTATE AND CALCIUM CHLORIDE: 600; 310; 30; 20 INJECTION, SOLUTION INTRAVENOUS at 17:08

## 2024-03-27 RX ADMIN — PHENYLEPHRINE HYDROCHLORIDE 100 MCG/MIN: 10 INJECTION INTRAVENOUS at 17:26

## 2024-03-27 RX ADMIN — BUPIVACAINE 20 ML: 13.3 INJECTION, SUSPENSION, LIPOSOMAL INFILTRATION at 19:09

## 2024-03-27 RX ADMIN — ONDANSETRON 4 MG: 2 INJECTION INTRAMUSCULAR; INTRAVENOUS at 17:11

## 2024-03-27 RX ADMIN — MORPHINE SULFATE 0.15 MG: 1 INJECTION EPIDURAL; INTRATHECAL; INTRAVENOUS at 17:28

## 2024-03-27 RX ADMIN — Medication 2 G: at 17:26

## 2024-03-27 RX ADMIN — SODIUM CITRATE AND CITRIC ACID MONOHYDRATE 30 ML: 500; 334 SOLUTION ORAL at 17:02

## 2024-03-27 RX ADMIN — PHENYLEPHRINE HYDROCHLORIDE 100 MCG: 10 INJECTION INTRAVENOUS at 17:34

## 2024-03-27 RX ADMIN — SODIUM CHLORIDE, POTASSIUM CHLORIDE, SODIUM LACTATE AND CALCIUM CHLORIDE: 600; 310; 30; 20 INJECTION, SOLUTION INTRAVENOUS at 18:02

## 2024-03-27 RX ADMIN — TRANEXAMIC ACID 1 G: 10 INJECTION, SOLUTION INTRAVENOUS at 18:18

## 2024-03-27 RX ADMIN — Medication 600 ML/HR: at 17:47

## 2024-03-27 RX ADMIN — ACETAMINOPHEN 975 MG: 325 TABLET, FILM COATED ORAL at 23:58

## 2024-03-27 ASSESSMENT — ACTIVITIES OF DAILY LIVING (ADL)
ADLS_ACUITY_SCORE: 18

## 2024-03-27 NOTE — NURSING NOTE
Recommendation is for patient to go to L&D for delivery today. L&D charge aware and Dr. Jarquin. Patient has her 2 yr old with her and she is going to bring him home with her sister and has been given direction on where to report to L&D. Instructed patient to not eat or drink and she and sister state understanding. Patient left clinic ambulatory and agree with plan.

## 2024-03-27 NOTE — PROGRESS NOTES
"Please see \"Imaging\" tab under \"Chart Review\" for details of today's visit.    Rody Barahona    "

## 2024-03-27 NOTE — TELEPHONE ENCOUNTER
"Received call from NAVEED [facility] re: c/section. Per NAVEED Yu MD is recommending delivery at 37+6.     Page sent to on-call MD:    \"[facility] pt [phone], di/di at 37+2, NAVEED recommending c/s at 37+6, can we get a case request? Thank you! Candace ([phone])\"    Notified [facility] surgery scheduler of needed c/section as well.   "

## 2024-03-27 NOTE — ANESTHESIA PREPROCEDURE EVALUATION
Anesthesia Pre-Procedure Evaluation    Patient: Xiao Vivas   MRN: [id number] : 1995        Procedure : Procedure(s):   section          Past Medical History:   Diagnosis Date     Anemia 02/10/2021    8/4/21: Has started IV iron transfusion  21: hgb 11.7     Family history of diabetes mellitus in mother 2021    NEeds early GCT--pt returned to care at 22 wks--too late for early GCT, will come in a few weeks for visit andGCT  EFW at 20wk--93%     Labor and delivery, indication for care 2021     Normal pregnancy in first trimester 2021    [facility] CNM pt  Partner's name: Janett   [X] Entered on Epic list  [X] NOB folder  [X] Dating LMP c/w 10 week US  [X] First tri screen declined  [ ] QS/AFP ordered declined  [X] Fetal anatomy US ordered  [x ] Rubella immune  [x ] Hep B immune     [NA ] Started ASA   [x ] NO  plan utox in labor   _____________________________________  [ ] EOB folder  [ ] PP Contraception plan: If tubal,consent date:      Thrombocytopenia (H24) 2021: plts 103, needs weekly CBC. Consider anesthesia consult if less than 100.      Vitamin D deficiency 2021    21- pt was prescribed 2,000 at intake. Notify at next appt she should take 4,000 daily.       Past Surgical History:   Procedure Laterality Date     NO HISTORY OF SURGERY        No Known Allergies   Social History     Tobacco Use     Smoking status: Never     Smokeless tobacco: Never   Substance Use Topics     Alcohol use: Not Currently      Wt Readings from Last 1 Encounters:   24 70.3 kg (154 lb 14.4 oz)        Anesthesia Evaluation   Pt has not had prior anesthetic         ROS/MED HX  ENT/Pulmonary:  - neg pulmonary ROS     Neurologic:  - neg neurologic ROS     Cardiovascular:  - neg cardiovascular ROS     METS/Exercise Tolerance:     Hematologic: Comments: Gestational thrombocytopenia    (+)      anemia,          Musculoskeletal:       GI/Hepatic:  - neg GI/hepatic ROS    "  Renal/Genitourinary:       Endo:  - neg endo ROS     Psychiatric/Substance Use:  - neg psychiatric ROS     Infectious Disease:       Malignancy:       Other:   Ultrasound shows fetal growth restriction as well as abnormal uterine doppler today in clinic.   (+)  , ,,twin IUP          Physical Exam    Airway        Mallampati: III   TM distance: > 3 FB   Neck ROM: full   Mouth opening: > 3 cm    Respiratory Devices and Support         Dental       (+) Minor Abnormalities - some fillings, tiny chips      Cardiovascular   cardiovascular exam normal       Rhythm and rate: regular     Pulmonary   pulmonary exam normal        breath sounds clear to auscultation       OUTSIDE LABS:  CBC:   Lab Results   Component Value Date    WBC 6.9 03/12/2024    WBC 5.6 03/05/2024    HGB 10.6 (L) 03/12/2024    HGB 10.7 (L) 03/05/2024    HCT 33.7 (L) 03/12/2024    HCT 33.8 (L) 03/05/2024     (L) 03/12/2024     (L) 03/05/2024     BMP:   Lab Results   Component Value Date     (L) 09/20/2023    POTASSIUM 3.6 09/20/2023    CHLORIDE 102 09/20/2023    CO2 17 (L) 09/20/2023    BUN 3.1 (L) 09/20/2023    CR 0.32 (L) 09/20/2023     (H) 09/20/2023     COAGS:   Lab Results   Component Value Date    PTT 32 09/04/2021    INR 1.03 09/04/2021    FIBR 505 (H) 09/04/2021     POC: No results found for: \"BGM\", \"HCG\", \"HCGS\"  HEPATIC:   Lab Results   Component Value Date    ALBUMIN 4.1 09/20/2023    PROTTOTAL 7.3 09/20/2023    ALT 16 09/20/2023    AST 21 09/20/2023    ALKPHOS 81 09/20/2023    BILITOTAL 0.3 09/20/2023     OTHER:   Lab Results   Component Value Date    A1C 5.2 09/20/2023    GISSELLE 9.2 09/20/2023       Anesthesia Plan    ASA Status:  3       Anesthesia Type: Spinal.              Consents    Anesthesia Plan(s) and associated risks, benefits, and realistic alternatives discussed. Questions answered and patient/representative(s) expressed understanding.     - Discussed: Risks, Benefits and Alternatives for BOTH SEDATION " and the PROCEDURE were discussed     - Discussed with:  Patient      - Extended Intubation/Ventilatory Support Discussed: No.      - Patient is DNR/DNI Status: No     Use of blood products discussed: No .     Postoperative Care    Pain management: IV analgesics, Oral pain medications, Peripheral nerve block (Single Shot).   PONV prophylaxis: Ondansetron (or other 5HT-3)     Comments:               Spring Gardiner MD    I have reviewed the pertinent notes and labs in the chart from the past 30 days and (re)examined the patient.  Any updates or changes from those notes are reflected in this note.             # Drug Induced Platelet Defect: home medication list includes an antiplatelet medication

## 2024-03-27 NOTE — ANESTHESIA PROCEDURE NOTES
"TAP Procedure Note    Pre-Procedure   Staff -        Anesthesiologist:  Spring Gardiner MD       Resident/Fellow: Aubrey Mabry MD       Performed By: resident       Location: pre-op       Pre-Anesthestic Checklist: patient identified, IV checked, site marked, risks and benefits discussed, informed consent, monitors and equipment checked, pre-op evaluation, at physician/surgeon's request and post-op pain management  Timeout:       Correct Patient: Yes        Correct Procedure: Yes        Correct Site: Yes        Correct Position: Yes        Correct Laterality: Yes        Site Marked: Yes  Procedure Documentation  Procedure: TAP       Diagnosis: POST OP PAIN CONTROL       Laterality: bilateral       Patient Position: supine       Skin prep: Chloraprep       Needle Type: insulated       Needle Gauge: 21.        Needle Length (Inches): 3.13        Ultrasound guided       1. Ultrasound was used to identify targeted nerve, plexus, vascular marker, or fascial plane and place a needle adjacent to it in real-time.       2. Ultrasound was used to visualize the spread of anesthetic in close proximity to the above referenced structure.       3. A permanent image is entered into the patient's record.       4. The visualized anatomic structures appeared normal.       5. There were no apparent abnormal pathologic findings.    Assessment/Narrative         The placement was negative for: blood aspirated, painful injection and site bleeding       Paresthesias: No.       Insertion/Infusion Method: Single Shot       Complications: none    Medication(s) Administered   Bupivacaine 0.25% PF (Infiltration) - Infiltration   20 mL - 3/27/2024 7:09:00 PM  Bupivacaine liposome (Exparel) 1.3% LA inj susp (Infiltration) - Infiltration   20 mL - 3/27/2024 7:09:00 PM    FOR [facility] ([facility] [place]) ONLY:   Pain Team Contact information: please page the Pain Team Via [employer]. Search \"Pain\". During daytime hours, please page the attending first. At " night please page the resident first.

## 2024-03-27 NOTE — NURSING NOTE
Patient reports positivie fetal movement, no pain, no contractions, leaking of fluid, or bleeding.   Patient denies headache, visual changes, nausea/vomiting, epigastric pain related to preeclampsia.  Education provided to patient on todays US, BP, NST.  SBAR given to MFVARINDER LEVI, see their note in Epic.   NST Performed due to FGR.   reviewed efm tracing. See NST/BPP Doc Flowsheet tab.

## 2024-03-27 NOTE — H&P
[facility]  Obstetrics History and Physical     Xiao Vivas MRN# [id number]   YOB: 1995 Age: 29 year old      Date of Admission: 3/27/2024      HPI:     Xiao Vivas is a 29 year old  at 37w2d by 10w1d US who presents today from [facility] clinic for  section due to fetal growth restriction and abnormal uterine artery doppler. She is seen with the assistance of a [facility]  via telephone and declines in person . She states that she is feeling well today. She is having some contractions which she reports as mild. No loss of fluid, no bleeding. Feeling good fetal movement. She denies headache, vision changes, chest pain, shortness of breath, fevers, chills, or recent infection.     Pregnancy notable for:   - Di/Di twin pregnancy  - Gestational thrombocytopenia  - Iron deficiency anemia  - Fetal growth restriction fetus 2  - Fetus 2 increased resistance in UA doppler    OB History:    OB History    Para Term  AB Living   5 4 4 0 0 4   SAB IAB Ectopic Multiple Live Births   0 0 0 0 4      # Outcome Date GA Lbr Shashi/2nd Weight Sex Delivery Anes PTL Lv   5 Current            4 Term 21 39w5d 05:30  12:51 3.67 kg (8 lb 1.5 oz) M Vag-Spont EPI, IV N SIMONA      Complications: Dysfunctional Labor      Name: BLANCA HERRERA      Apgar1: 8  Apgar5: 9   3 Term 12/04/15 40w0d  2.722 kg (6 lb) F  EPI  SIMONA   2 Term 10/22/13 40w0d  2.722 kg (6 lb) F  None  SIMONA   1 Term 12 40w0d  2.892 kg (6 lb 6 oz) M  EPI  SIMONA      Obstetric Comments   No HTN, GDM, PPD, or PPH. Anemia in pregnancy. First in [place] and 2nd and 3th in [place], 4th with [facility]                 Past Medical History:     Past Medical History:   Diagnosis Date    Anemia 02/10/2021    8/4/21: Has started IV iron transfusion  21: hgb 11.7    Family history of diabetes mellitus in mother 2021    NEeds early GCT--pt returned to care at 22  wks--too late for early GCT, will come in a few weeks for visit andGCT  EFW at 20wk--93%    Labor and delivery, indication for care 09/04/2021    Normal pregnancy in first trimester 02/16/2021    [facility] CNM pt  Partner's name: Janett   [X] Entered on Epic list  [X] NOB folder  [X] Dating LMP c/w 10 week US  [X] First tri screen declined  [ ] QS/AFP ordered declined  [X] Fetal anatomy US ordered  [x ] Rubella immune  [x ] Hep B immune     [NA ] Started ASA   [x ] NO  plan utox in labor   _____________________________________  [ ] EOB folder  [ ] PP Contraception plan: If tubal,consent date:     Thrombocytopenia (H24) 08/16/2021 8/16/21: plts 103, needs weekly CBC. Consider anesthesia consult if less than 100.     Vitamin D deficiency 02/11/2021    21- pt was prescribed 2,000 at intake. Notify at next appt she should take 4,000 daily.              Past Surgical History:     Past Surgical History:   Procedure Laterality Date    NO HISTORY OF SURGERY               Social History:     Social History     Tobacco Use    Smoking status: Never    Smokeless tobacco: Never   Substance Use Topics    Alcohol use: Not Currently             Family History:     Family History   Problem Relation Age of Onset    Diabetes Mother     No Known Problems Father     No Known Problems Sister     No Known Problems Brother     No Known Problems Brother     No Known Problems Brother     No Known Problems Brother     No Known Problems Brother     No Known Problems Sister     No Known Problems Sister     No Known Problems Sister              Immunizations:     Immunization History   Administered Date(s) Administered    COVID-19 MONOVALENT 12+ (Pfizer) 04/28/2023    COVID-19 Monovalent 12+ (Pfizer 2022) 03/30/2023    HEPATITIS A (PEDS 12M-18Y) 05/27/2010    HPV Quadrivalent 05/27/2010    Hepatitis B, Peds 04/16/2010    Influenza (IIV3) PF 10/06/2011    Influenza Vaccine >6 months,quad, PF 02/10/2021, 11/13/2023    Influenza, seasonal,  injectable, PF 11/17/2010    Poliovirus, inactivated (IPV) 04/16/2010    TDAP (Adacel,Boostrix) 06/24/2021, 02/06/2024    Td (Adult), Adsorbed 07/29/2010    Typhoid IM 12/01/2020            Allergies:   No Known Allergies          Medications:     Medications Prior to Admission   Medication Sig Dispense Refill Last Dose    cholecalciferol (VITAMIN D3) 125 mcg (5000 units) capsule Take 1 capsule (125 mcg) by mouth daily Take one capsule daily. 90 capsule 3 3/26/2024    Prenatal Vit-Fe Fumarate-FA (PRENATAL MULTIVITAMIN W/IRON) 27-0.8 MG tablet Take 1 tablet by mouth daily 90 tablet 3 3/26/2024    acetaminophen (TYLENOL) 325 MG tablet Take 2 tablets (650 mg) by mouth every 4 hours as needed for mild pain (Patient not taking: Reported on 3/19/2024) 60 tablet 0     aspirin (ASA) 81 MG chewable tablet Take 1 tablet (81 mg) by mouth daily 90 tablet 3     doxylamine (UNISOM) 25 MG TABS tablet Take 0.5 tablets (12.5 mg) by mouth at bedtime 20 tablet 1     famotidine (PEPCID) 20 MG tablet Take 1 tablet (20 mg) by mouth 2 times daily 30 tablet 1     ferrous sulfate (FEROSUL) 325 (65 Fe) MG tablet Take 1 tablet (325 mg) by mouth daily (with breakfast) 90 tablet 3              Review of Systems & Physical Exam:     The Review of Systems is negative other than noted in the HPI      /77   Temp 98.2  F (36.8  C) (Oral)   Resp 18   LMP 07/19/2023 (Exact Date)   Gen: resting comfortably  CV: Regular rate, well perfused  Lungs: non-labored breathing  Abd: Gravid, non-tender, non-distended  Ext: trace peripheral extremity edema    Presentation: twin 1 breech, maternal right; twin 2 cephalic, maternal left on [facility] ultrasound today    FHT:  Monitoring External  FHT Twin 1: Baseline 120 bpm; moderate variability; accels present; no decelerations  FHT Twin 2: Baseline 125 bpm; moderate variability; accels present; no decelerations  TOCO 5 contractions in 10 minutes         Data:   Prenatal Lab Results:  Lab Results   Component  Value Date    ABO O 02/10/2021    RH Pos 02/10/2021    AS Negative 2023    HEPBANG Nonreactive 2023    CHPCRT Negative 10/17/2023    GCPCRT Negative 10/17/2023    HGB 10.6 (L) 2024       GBS Status: Negative     Assessment and Plan:     Xiao Vivas is a 29 year old  at 37w2d by 10w1d ultrasound admitted for scheduled  section. Pregnancy notable for di-di twin pregnancy, fetal growth restriction fetus 2, increased resistance on UA doppler for fetus two, iron deficiency anemia, and gestational thrombocytopenia.    Scheduled Primary  Section  Indicated due to di-di twin pregnancy with breech presenting twin. Will plan for a Pfannenstiel skin incision with low-transverse hysterotomy.  - Labs: CBC, T&S, RPR   - Pre-op Hgb, plt pending  - Placenta: both anterior  - Anesthesia: Spinal   - 2g Ancef   - PPH Meds/Ppx: no contraindications  - Diet: NPO  - PPx: SCDs  - Consent: Using telephone Thai , discussed risks and benefits of procedure, including but not limited to bleeding, infection, injury to surrounding organs, injury to infant, and the potential need for another surgery should some injury go unrecognized or patient were to have continued bleeding. Patient had time to ask questions and expressed understanding of procedure and associated risks. Agreed to blood transfusion if necessary. Consent signed with assistance of Thai  via telephone.    Gestational Thrombocytopenia  - plt 112 last (3/12)  - pending today    Iron Deficiency Anemia  - Hgb 10.6 on 3/12  - pending today    Fetal Growth Restriction, Twin 2  - EFW 3%, AC <1%  - UA doppler 98%, increased resistance  - MFM recommends delivery given increased resistance of dopplers at >37w.    PNC  - Rh positive, Rubella immune, GBS negative     FWB  Cat 1 tracing, reactive for both twins    Patient discussed with Dr. Britton Rodriguez MD  OB/GYN PGY-2  2024 2:57 PM    [facility] staff:  Appreciate note by Dr. Rodriguez.  I have seen and examined the patient without the resident. I have reviewed, edited, and agree with the note.      Cherelle Jarquin MD, FACOG  3/27/2024  4:12 PM

## 2024-03-27 NOTE — OP NOTE
[facility]  Operative Note     Surgery Date:  3/27/2024    Surgeon:  Xena Lewis MD    Assistants:  Harvey Rodriguez MD, PGY-2    Delaney Norton MD, PGY-2    Linda Pineda MS3    Pre-op Diagnosis:    - Intrauterine pregnancy at 37w2d  - Dichorionic diamniotic twin pregnancy   - Fetal growth restriction of fetus 2  - Increased umbilical artery doppler resistance of fetus 2  - Gestational thrombocytopenia  - Iron deficiency anemia  - Breech presentation of presenting twin    Post-op Diagnosis:    - Same, now delivered    Procedure:  Primary low-transverse  section with two layer uterine closure via Pfannenstiel incision    Anesthesia: Spinal  EBL:  1662 mL  IVF:  1000 mL crystalloid  UOP:  100 mL clear urine at the end of the case  Drains: Choudhury Catheter   Specimens:  Placenta    Indications:   Xiao Vivas is a 29 year old  at 37w2d admitted for planned  section in the setting of fetal growth restriction of twin B and increased umbilical artery resistance. Pregnancy was complicated by dichorionic diamniotic twin pregnancy, iron deficiency anemia, gestational thrombocytopenia, FGR of fetus 2, increased UA doppler resistance, and presenting twin breech presentation. The risks, benefits, and alternatives of  section were discussed with the patient, and she agreed to proceed. Written informed consent was signed, including consent for blood transfusions.    Findings:   - No rectofascial or intraabdominal adhesions.  - Clear amniotic fluid  - Twin A: liveborn male infant in breech presentation. Apgars 5 at 1 minute, 7 at 5 minutes, 8 at 10 minutes, and 15 at 8 minutes. Weight pending.  - Twin B: liveborn male infant in cephalic presentation. Apgars 8 at 1 minute, 8 at 5 minutes, and 9 at 10 minutes. Weight 4 lb 5.8 oz (1980g).  - Normal uterus, fallopian tubes, and ovaries.   - Left hematoma extending past left corner of the hysterotomy and just inferior,  about 4-5 cm across and 3 cm down. Stable over serial examinations.  - Postpartum hemorrhage first due to uterine atony and secondarily related to continued oozing. Improved with IM methergine, IV TXA. Surgifoam placed over an area of serosa near the right lower aspect of the hysterotomy.    Procedure Details:   The patient was brought to the operating room, where adequate spinal anesthesia was administered. She received 2g Ancef for antibiotic prophylaxis. She was placed in the dorsal supine position with a slight leftward tilt. She was prepped and draped in the usual sterile fashion. A surgical time out was performed. A pfannenstiel skin incision was made with the scalpel and carried down to the underlying fascia with sharp and blunt dissection. The fascia was incised in the midline and the incision was bluntly extended in the cephalocaudad direction with pressure, dissecting the fascia off the underlying rectus muscles. The rectus muscles were  in the midline, the peritoneum was entered bluntly, and the opening was extended with digital pressure and electrocautery. The bladder blade was placed. The vesicouterine peritoneum was incised in the midline, and the incision was extended laterally with the Metzenbaum scissors. A bladder flap was created digitally and the bladder blade was replaced. A transverse hysterotomy was made with the scalpel in the lower uterine segment, and the incision was extended with digital pressure. Twin A was noted to be in the breech position and was delivered atraumatically using the typical breech maneuvers. The shoulders and head delivered easily. The cord was doubly clamped and cut after >60 seconds of pulsation, and the infant was handed off to the awaiting NICU staff.  Twin B was then noted to be in the cephalic position and delivered atraumatically. The shoulders delivered easily. The cord was doubly clamped and cut after at least 30 seconds and the infant was handed off  to the awaiting NICU staff. A segment of cord was cut for each infant. The placenta was delivered with gentle traction on the umbilical cords and uterine massage. There were trailing membranes and the uterus was inspected thoroughly. The uterus was cleared of all clots and debris. Uterine tone was noted to be atonic and IM methergine was called for. The hysterotomy was closed with a running locked suture of 0 Vicryl. There was a hematoma noted just lateral and inferior to the left corner of the hysterotomy. A figure of X stitch was placed over the superior and medial aspect of the hematoma at an area of serosal bleeding with improvement in bleeding and no increased size of hematoma. Diffuse oozing over bladder flap site on uterus was noted improved with electrocautery. The hysterotomy was then imbricated with 0 Vicryl. A small serosal tear was noted near the hematoma and Surgifoam was placed over this area, about 2 cm by 1 cm. The pericolic gutters were cleared of all clots and debris. The hysterotomy was reexamined and noted to be hemostatic. The fascia and rectus muscles were examined and areas of oozing were controlled with electrocautery. A figure of X stitch was placed at two separate areas on the left rectus muscle using 0 Vicryl suture. The pelvis was again re-inspected and the hematoma was again noted to be stable. The fascia was closed with a running 0 Vicryl suture. The subcutaneous tissue was irrigated and areas of oozing were controlled with electrocautery. The subcutaneous tissue was closed with 3-0 Vicryl. The skin was closed with 4-0 Monocryl and covered with a sterile bandage.    All sponge, needle, and instrument counts were correct. The patient tolerated the procedure well, and was transferred to recovery in stable condition. Dr. Lewis was present for yanes portions of the procedure.     Complications: None apparent    Harvey Rodriguez MD  OB/GYN PGY-2  03/27/2024 3:49 PM

## 2024-03-27 NOTE — PLAN OF CARE
Delivery of di/di twins via primary LTCS.   A - male, delivered 1744.  B - male, delivered 1746.   NICU present due to FGR of B, breech position of A, multiples. Both newborns viable and admitted to NICU.   Surgery in progress.

## 2024-03-27 NOTE — PLAN OF CARE
Pt arrived for evaluation following abnormal dopplers for baby B in the clinic. Pt with di/di twins. EFM/toco applied. Pt reports contractions that are uncomfortable every few minutes. Denies LOF, VB. Reports active fetal movement. Triage and admission assessment completed. MD Rodriguez to bedside to discuss plan of care. Plan for primary  section today.

## 2024-03-27 NOTE — ANESTHESIA PROCEDURE NOTES
"Intrathecal injection Procedure Note    Pre-Procedure   Staff -        Anesthesiologist:  Spring Gardiner MD       Resident/Fellow: Aubrey Mabry MD       Performed By: anesthesiologist       Location: OR       Pre-Anesthestic Checklist: patient identified, IV checked, risks and benefits discussed, informed consent, monitors and equipment checked, pre-op evaluation, at physician/surgeon's request and post-op pain management  Timeout:       Correct Patient: Yes        Correct Procedure: Yes        Correct Site: Yes        Correct Position: Yes   Procedure Documentation  Procedure: intrathecal injection       Diagnosis: analgesia       Patient Position: sitting       Patient Prep/Sterile Barriers: sterile gloves, mask, patient draped       Skin prep: Chloraprep       Insertion Site: L3-4. (midline approach).       Needle Gauge: 25.        Needle Length (Inches): 3.5        Spinal Needle Type: Pencan       Introducer used       Introducer: 20 G       # of attempts: 3 and  # of redirects:  2    Assessment/Narrative         Paresthesias: No.       CSF fluid: clear.       Opening pressure was cmH2O while  Sitting.      Medication(s) Administered   0.75% Hyperbaric Bupivacaine (Intrathecal) - Intrathecal   1.6 mL - 3/27/2024 5:28:00 PM  Fentanyl PF (Intrathecal) - Intrathecal   15 mcg - 3/27/2024 5:28:00 PM  Morphine PF 1 mg/mL (Intrathecal) - Intrathecal   0.15 mg - 3/27/2024 5:28:00 PM    FOR [facility] ([facility]) ONLY:   Pain Team Contact information: please page the Pain Team Via [employer]. Search \"Pain\". During daytime hours, please page the attending first. At night please page the resident first.      "

## 2024-03-28 LAB
ERYTHROCYTE [DISTWIDTH] IN BLOOD BY AUTOMATED COUNT: 16.2 % (ref 10–15)
ERYTHROCYTE [DISTWIDTH] IN BLOOD BY AUTOMATED COUNT: 16.5 % (ref 10–15)
HCT VFR BLD AUTO: 26.9 % (ref 35–47)
HCT VFR BLD AUTO: 28.5 % (ref 35–47)
HGB BLD-MCNC: 8.7 G/DL (ref 11.7–15.7)
HGB BLD-MCNC: 9 G/DL (ref 11.7–15.7)
MCH RBC QN AUTO: 27.8 PG (ref 26.5–33)
MCH RBC QN AUTO: 28.2 PG (ref 26.5–33)
MCHC RBC AUTO-ENTMCNC: 31.6 G/DL (ref 31.5–36.5)
MCHC RBC AUTO-ENTMCNC: 32.3 G/DL (ref 31.5–36.5)
MCV RBC AUTO: 87 FL (ref 78–100)
MCV RBC AUTO: 88 FL (ref 78–100)
PLATELET # BLD AUTO: 103 10E3/UL (ref 150–450)
PLATELET # BLD AUTO: 94 10E3/UL (ref 150–450)
RBC # BLD AUTO: 3.08 10E6/UL (ref 3.8–5.2)
RBC # BLD AUTO: 3.24 10E6/UL (ref 3.8–5.2)
WBC # BLD AUTO: 6.9 10E3/UL (ref 4–11)
WBC # BLD AUTO: 7.3 10E3/UL (ref 4–11)

## 2024-03-28 PROCEDURE — 85027 COMPLETE CBC AUTOMATED: CPT | Performed by: OBSTETRICS & GYNECOLOGY

## 2024-03-28 PROCEDURE — 85027 COMPLETE CBC AUTOMATED: CPT

## 2024-03-28 PROCEDURE — 250N000013 HC RX MED GY IP 250 OP 250 PS 637

## 2024-03-28 PROCEDURE — 120N000002 HC R&B MED SURG/OB UMMC

## 2024-03-28 PROCEDURE — 36415 COLL VENOUS BLD VENIPUNCTURE: CPT | Performed by: OBSTETRICS & GYNECOLOGY

## 2024-03-28 PROCEDURE — 250N000011 HC RX IP 250 OP 636

## 2024-03-28 PROCEDURE — 36415 COLL VENOUS BLD VENIPUNCTURE: CPT

## 2024-03-28 PROCEDURE — 250N000011 HC RX IP 250 OP 636: Mod: JZ

## 2024-03-28 RX ORDER — NALBUPHINE HYDROCHLORIDE 10 MG/ML
10 INJECTION, SOLUTION INTRAMUSCULAR; INTRAVENOUS; SUBCUTANEOUS EVERY 6 HOURS PRN
Status: DISCONTINUED | OUTPATIENT
Start: 2024-03-28 | End: 2024-03-30 | Stop reason: HOSPADM

## 2024-03-28 RX ORDER — DIPHENHYDRAMINE HYDROCHLORIDE 50 MG/ML
25-50 INJECTION INTRAMUSCULAR; INTRAVENOUS EVERY 6 HOURS PRN
Status: DISCONTINUED | OUTPATIENT
Start: 2024-03-28 | End: 2024-03-30 | Stop reason: HOSPADM

## 2024-03-28 RX ORDER — DIPHENHYDRAMINE HCL 25 MG
25-50 CAPSULE ORAL EVERY 6 HOURS PRN
Status: DISCONTINUED | OUTPATIENT
Start: 2024-03-28 | End: 2024-03-30 | Stop reason: HOSPADM

## 2024-03-28 RX ORDER — ENOXAPARIN SODIUM 100 MG/ML
40 INJECTION SUBCUTANEOUS EVERY 24 HOURS
Status: DISCONTINUED | OUTPATIENT
Start: 2024-03-28 | End: 2024-03-30 | Stop reason: HOSPADM

## 2024-03-28 RX ADMIN — METOCLOPRAMIDE HYDROCHLORIDE 10 MG: 5 INJECTION INTRAMUSCULAR; INTRAVENOUS at 22:24

## 2024-03-28 RX ADMIN — SENNOSIDES AND DOCUSATE SODIUM 2 TABLET: 8.6; 5 TABLET ORAL at 07:33

## 2024-03-28 RX ADMIN — KETOROLAC TROMETHAMINE 30 MG: 30 INJECTION, SOLUTION INTRAMUSCULAR at 14:20

## 2024-03-28 RX ADMIN — ACETAMINOPHEN 975 MG: 325 TABLET, FILM COATED ORAL at 05:29

## 2024-03-28 RX ADMIN — DIPHENHYDRAMINE HYDROCHLORIDE 50 MG: 25 CAPSULE ORAL at 04:20

## 2024-03-28 RX ADMIN — ACETAMINOPHEN 975 MG: 325 TABLET, FILM COATED ORAL at 11:31

## 2024-03-28 RX ADMIN — SENNOSIDES AND DOCUSATE SODIUM 2 TABLET: 8.6; 5 TABLET ORAL at 20:50

## 2024-03-28 RX ADMIN — KETOROLAC TROMETHAMINE 30 MG: 30 INJECTION, SOLUTION INTRAMUSCULAR at 07:33

## 2024-03-28 RX ADMIN — IBUPROFEN 800 MG: 800 TABLET, FILM COATED ORAL at 20:50

## 2024-03-28 RX ADMIN — ACETAMINOPHEN 975 MG: 325 TABLET, FILM COATED ORAL at 17:30

## 2024-03-28 RX ADMIN — ENOXAPARIN SODIUM 40 MG: 40 INJECTION SUBCUTANEOUS at 22:30

## 2024-03-28 ASSESSMENT — ACTIVITIES OF DAILY LIVING (ADL)
ADLS_ACUITY_SCORE: 22
ADLS_ACUITY_SCORE: 18
ADLS_ACUITY_SCORE: 18
ADLS_ACUITY_SCORE: 22
ADLS_ACUITY_SCORE: 18
ADLS_ACUITY_SCORE: 18
ADLS_ACUITY_SCORE: 22
ADLS_ACUITY_SCORE: 18
ADLS_ACUITY_SCORE: 22
ADLS_ACUITY_SCORE: 22
ADLS_ACUITY_SCORE: 18
ADLS_ACUITY_SCORE: 22
ADLS_ACUITY_SCORE: 22
ADLS_ACUITY_SCORE: 18
ADLS_ACUITY_SCORE: 22
ADLS_ACUITY_SCORE: 24
ADLS_ACUITY_SCORE: 22
ADLS_ACUITY_SCORE: 22

## 2024-03-28 NOTE — PROGRESS NOTES
[facility]   Post-partum Progress Note    Name:  Xiao Vivas  MRN: [id number]    S: Patient is feeling well this morning. Mostly just noting abdominal pain. She notes it is manageable with pain medications. She stood at the side of her bed without lightheadedness or dizziness but she has not yet ambulated significantly. Choudhury is just now out and she has not yet voided. Not yet passing gas. Tolerating regular diet without nausea or vomiting. Planning on breast feeding.      O:   Patient Vitals for the past 24 hrs:   BP Temp Temp src Pulse Resp SpO2   03/28/24 0206 (!) 129/91 -- -- 100 16 100 %   03/28/24 0109 122/87 -- -- 100 16 100 %   03/27/24 2357 123/85 -- -- 96 14 100 %   03/27/24 2257 117/76 98.4  F (36.9  C) Oral 97 14 98 %   03/27/24 2120 -- -- -- -- -- 98 %   03/27/24 2115 -- -- -- -- -- 98 %   03/27/24 2100 110/68 -- -- 111 20 --   03/27/24 2058 105/57 -- -- -- -- --   03/27/24 2057 102/60 -- -- -- -- --   03/27/24 2047 93/53 -- -- 106 16 --   03/27/24 2031 -- -- -- 109 18 --   03/27/24 2030 110/65 -- -- 98 17 --   03/27/24 2015 103/78 -- -- 92 14 --   03/27/24 1955 -- -- -- 99 13 --   03/27/24 1945 120/73 -- -- 90 -- --   03/27/24 1944 -- -- -- -- 13 --   03/27/24 1940 -- 97.6  F (36.4  C) Oral 91 17 --   03/27/24 1930 100/85 -- -- 93 11 --   03/27/24 1921 -- -- -- 89 13 --   03/27/24 1918 -- -- -- 87 20 --   03/27/24 1916 105/65 -- -- 92 -- --   03/27/24 1450 -- 98.2  F (36.8  C) Oral -- 18 --   03/27/24 1449 111/77 -- -- -- -- --     Gen:  Resting comfortably, NAD  CV:  RR, well perfused  Pulm:  Non-labored breathing on room air  Abd:  Soft, appropriately ttp, non-distended.Fundus at umbilicus, firm and non-tender.   Incision:  Covered in sterile bandage which is clean, dry, intact with no shadowing. No erythema, drainage or induration   Ext:  non-tender, trace edema to bilateral lower extremities    I/O last 3 completed shifts:  In: 1000 [I.V.:1000]  Out: 1981  [Urine:225; Blood:1756]      Assessment/Plan:  Xiao Vivas 29 year old  on POD #1 s/p PLTCS in the setting of Di-Di Twin pregnancy, FGR and elevated UAD.    # Postpartum management  Pain: Fairly well-controlled with tylenol, and oxycodone PRN. NSAIDs initially held due to bleeding during surgery; will resume as plts and hgb overall stable on recheck overnight.  GI:  PRN bowel regimen, anti-emetics  : Choudhury out, awaiting spontaneous void by 1230  Hgb: 11> EBL 1662 (mthg, TXA > 10.6> AM Hemoglobin pending. Asymptomatic from blood loss anemia; will discharge with oral iron if <10.   Rh: Positive  Rubella: immune  Feed: Breastfeeding. No issues noted this morning  BC: Declines as her  does not live here.  PPx:  Encourage ambulation, IS, SCDs while confined to bed    # gTCP  - Platelets stable at 116 after surgery  -  platelets pending this am    Dispo: Anticipate discharge home on POD#3-4    Harvey Rodriguez MD  OB/GYN PGY-2  2024 6:47 AM    Staff MD Note    I appreciate the note by Dr. Rodriguez.  Any necessary changes have been made by me.  I saw and evaluated the patient and agree with the findings and plan of care as documented in the note.    Maryjane La MD

## 2024-03-28 NOTE — PLAN OF CARE
VSS. Postpartum assessments within normal limits. Fundus is midline, firm, and at umbilicus. Lochia is scant. Pain adequately managed with tylenol. Incision covered, no drainage. Choudhury catheter removed at 0630, due to void. Bottle feeding infants formula on demand. Bonding well with both newborns. Mom and sister present at bedside and supportive. Continue with education and plan of care.     Goal Outcome Evaluation:  Problem: Postpartum ( Delivery)  Goal: Effective Urinary Elimination  Outcome: Progressing     Problem: Postpartum ( Delivery)  Goal: Optimal Pain Control and Function  Outcome: Progressing     Problem: Postpartum ( Delivery)  Goal: Optimal Pain Control and Function  Outcome: Progressing

## 2024-03-28 NOTE — PROVIDER NOTIFICATION
03/28/24 0343   Provider Notification   Provider Name/Title Errol Baltazar   Method of Notification Phone   Request Evaluate-Remote   Notification Reason Medication Request     Pt is requesting benadryl for itchy skin. Thank you    no

## 2024-03-28 NOTE — DISCHARGE SUMMARY
[facility] Discharge Summary    Xiao Vivas MRN# [id number]   Age: 29 year old YOB: 1995     Date of Admission:  3/27/2024  Date of Discharge:  3/30/2024  Admitting Physician:  Cherelle Jarquin MD  Discharge Physician:  Mehreen Wynne MD              Admission Diagnoses:    at 37w1d  Di-Di Twin Pregnancy  Gestational thrombocytopenia  Iron deficiency anemia  Fetal growth restriction fetus 2  Fetus 2 increased resistance in UA doppler       Discharge Diagnosis:     Same, now , delivered   Acute blood Loss Anemia  Postpartum Hemorrhage          Procedures:     Procedure(s): Primary low transverse  section with two  layer closure via Pfannenstiel skin incision  TAP Block                 Medications Prior to Admission:     Medications Prior to Admission   Medication Sig Dispense Refill Last Dose    cholecalciferol (VITAMIN D3) 125 mcg (5000 units) capsule Take 1 capsule (125 mcg) by mouth daily Take one capsule daily. 90 capsule 3 3/26/2024    Prenatal Vit-Fe Fumarate-FA (PRENATAL MULTIVITAMIN W/IRON) 27-0.8 MG tablet Take 1 tablet by mouth daily 90 tablet 3 3/26/2024    acetaminophen (TYLENOL) 325 MG tablet Take 2 tablets (650 mg) by mouth every 4 hours as needed for mild pain (Patient not taking: Reported on 3/19/2024) 60 tablet 0     aspirin (ASA) 81 MG chewable tablet Take 1 tablet (81 mg) by mouth daily 90 tablet 3     doxylamine (UNISOM) 25 MG TABS tablet Take 0.5 tablets (12.5 mg) by mouth at bedtime 20 tablet 1     famotidine (PEPCID) 20 MG tablet Take 1 tablet (20 mg) by mouth 2 times daily 30 tablet 1     ferrous sulfate (FEROSUL) 325 (65 Fe) MG tablet Take 1 tablet (325 mg) by mouth daily (with breakfast) 90 tablet 3              Discharge Medications:        Review of your medicines        START taking        Dose / Directions   ibuprofen 800 MG tablet  Commonly known as: ADVIL/MOTRIN  Used for: S/P  section      Dose: 800 mg  Take 1  tablet (800 mg) by mouth every 6 hours  Quantity: 60 tablet  Refills: 0     senna-docusate 8.6-50 MG tablet  Commonly known as: SENOKOT-S/PERICOLACE  Used for: S/P  section      Dose: 1 tablet  Take 1 tablet by mouth 2 times daily  Quantity: 60 tablet  Refills: 0            CONTINUE these medicines which may have CHANGED, or have new prescriptions. If we are uncertain of the size of tablets/capsules you have at home, strength may be listed as something that might have changed.        Dose / Directions   acetaminophen 325 MG tablet  Commonly known as: TYLENOL  This may have changed:   how much to take  when to take this  reasons to take this  Used for: S/P  section      Dose: 975 mg  Take 3 tablets (975 mg) by mouth every 6 hours  Quantity: 60 tablet  Refills: 0            CONTINUE these medicines which have NOT CHANGED        Dose / Directions   ferrous sulfate 325 (65 Fe) MG tablet  Commonly known as: FEROSUL  Used for: Iron deficiency anemia due to chronic blood loss      Dose: 325 mg  Take 1 tablet (325 mg) by mouth daily (with breakfast)  Quantity: 90 tablet  Refills: 3     prenatal multivitamin w/iron 27-0.8 MG tablet  Used for: Dichorionic diamniotic twin pregnancy in second trimester      Dose: 1 tablet  Take 1 tablet by mouth daily  Quantity: 90 tablet  Refills: 3            STOP taking      aspirin 81 MG chewable tablet  Commonly known as: ASA        cholecalciferol 125 mcg (5000 units) capsule        doxylamine 25 MG Tabs tablet  Commonly known as: UNISOM        famotidine 20 MG tablet  Commonly known as: PEPCID                  Where to get your medicines        These medications were sent to [facility] [place], [place] - [address]  [address] [address]      Phone: [phone]   acetaminophen 325 MG tablet  ibuprofen 800 MG tablet  senna-docusate 8.6-50 MG tablet               Consultations:   Anesthesia          Brief Admission History:    Ms. Xiao Vivas is a 29 year old now P6 who initially presented at 37w1d for unscheduled  in the setting of fetal growth restriction of twin B and increased umbilical artery resistance. Pregnancy was complicated by dichorionic diamniotic twin pregnancy, iron deficiency anemia, gestational thrombocytopenia, FGR of fetus 2, increased UA doppler resistance, and presenting twin breech presentation. The risks, benefits, and alternatives of  section were discussed with the patient, and she agreed to proceed. Written informed consent was signed, including consent for blood transfusions. .       Intraoperative course   The procedure was complicated by PPH.  EBL 1662 mL.  See operative report for details.   Findings:   - No rectofascial or intraabdominal adhesions.  - Clear amniotic fluid  - Twin A: liveborn male infant in breech presentation. Apgars 5 at 1 minute, 7 at 5 minutes, 8 at 10 minutes, and 15 at 8 minutes. Weight pending.  - Twin B: liveborn male infant in cephalic presentation. Apgars 8 at 1 minute, 8 at 5 minutes, and 9 at 10 minutes. Weight 4 lb 5.8 oz (1980g).  - Normal uterus, fallopian tubes, and ovaries.   - Left hematoma extending past left corner of the hysterotomy and just inferior, about 4-5 cm across and 3 cm down. Stable over serial examinations.  - Postpartum hemorrhage first due to uterine atony and secondarily related to continued oozing. Improved with IM methergine, IV TXA. Surgifoam placed over an area of serosa near the right lower aspect of the hysterotomy.     Postpartum Course   The patient's hospital course was unremarkable.  She recovered as anticipated and experienced no post-operative complications. On discharge, her pain was well controlled. Vaginal bleeding is similar to peak menstrual flow.  Voiding without difficulty.  Ambulating well and tolerating a normal diet.  No fever or significant wound drainage.  Breastfeeding well.  Infant is stable.  Having bowel  movements. She was discharged on post-partum day #3.    Post-partum hemoglobin:   Hemoglobin   Date Value Ref Range Status   03/27/2024 10.6 (L) 11.7 - 15.7 g/dL Final   05/27/2021 8.4 (L) 11.7 - 15.7 g/dL Final             Discharge Instructions and Follow-Up:     Discharge diet: Regular   Discharge activity: No lifting greater than 20 lbs, pushing, pulling, or other strenuous activity for 6 weeks. Pelvic rest for 6 weeks including no sexual intercourse, tampons, or douching. No driving until you can slam on the brakes without pain or while on narcotic pain medications.    Discharge follow-up: Follow up with primary OB for routine postpartum visit in 6 weeks  Incision check in 2 weeks   Wound care: Keep incision clean and dry           Discharge Disposition:     Discharged to home in stable condition      Mehreen Wynne MD

## 2024-03-28 NOTE — PROGRESS NOTES
Data: Xiao Vivas transferred to [facility] via cart at 2250. VSS. Denies pre- E symptoms. Minimal bleeding.  Action: Receiving unit notified of transfer: Yes. Patient and family notified of room change. Report given to KONG Wong at 2040. Belongings sent to receiving unit. Accompanied by Registered Nurse. Oriented patient to surroundings. Call light within reach.   Response: Patient tolerated transfer and is stable. Babies currently in NICU. Pt able to visit for 90 minutes prior to transfer.

## 2024-03-28 NOTE — PROGRESS NOTES
Patient arrived to [facility] unit via cart at 2350 ,with belongings, accompanied by Mom, and Sister. Got report from KONG Amador and checked bands. Unit and room orientation Complete. All questions and concerns addressed at this time. Continue with education and plan of care.

## 2024-03-28 NOTE — LACTATION NOTE
"This note was copied from a baby's chart.  Consult for: Early term twins, 5th and 6th baby, mom has not pumped yet    Infant Name: undecided, for now \"A\" and \"B\"    Infant's Primary Care Clinic: [facility]     Delivery Information:  Primary  delivery of twins at 37w2d on 3/27/2024, 5:44 and 5:46 PM. Baby A was AGA @ 6# 7 ounce birthweight, Baby B was SGA @ 4 # 5.8 ounce birthweight. They were less than 24 hours old at time of visit.     Maternal Health History:    Information for the patient's mother:  Xiao Vivas [[id number]]     Past Medical History:   Diagnosis Date    Anemia 02/10/2021    8/4/21: Has started IV iron transfusion  21: hgb 11.7    Family history of diabetes mellitus in mother 2021    NEeds early GCT--pt returned to care at 22 wks--too late for early GCT, will come in a few weeks for visit andGCT  EFW at 20wk--93%    Labor and delivery, indication for care 2021    Normal pregnancy in first trimester 2021    [facility] CNM pt  Partner's name: Janett   [X] Entered on Epic list  [X] NOB folder  [X] Dating LMP c/w 10 week US  [X] First tri screen declined  [ ] QS/AFP ordered declined  [X] Fetal anatomy US ordered  [x ] Rubella immune  [x ] Hep B immune     [NA ] Started ASA   [x ] NO  plan utox in labor   _____________________________________  [ ] EOB folder  [ ] PP Contraception plan: If tubal,consent date:     Thrombocytopenia (H24) 2021: plts 103, needs weekly CBC. Consider anesthesia consult if less than 100.     Vitamin D deficiency 2021    21- pt was prescribed 2,000 at intake. Notify at next appt she should take 4,000 daily.     ,   Information for the patient's mother:  Xiao Vivas [[id number]]     Patient Active Problem List   Diagnosis    Family history of diabetes mellitus in mother    Dichorionic diamniotic twin pregnancy in first trimester    Vitamin D deficiency    Fetal growth restriction antepartum    " Dichorionic diamniotic twin pregnancy    , and   Information for the patient's mother:  Xiao Vivas [[id number]]     Medications Prior to Admission   Medication Sig Dispense Refill Last Dose    cholecalciferol (VITAMIN D3) 125 mcg (5000 units) capsule Take 1 capsule (125 mcg) by mouth daily Take one capsule daily. 90 capsule 3 3/26/2024    Prenatal Vit-Fe Fumarate-FA (PRENATAL MULTIVITAMIN W/IRON) 27-0.8 MG tablet Take 1 tablet by mouth daily 90 tablet 3 3/26/2024    acetaminophen (TYLENOL) 325 MG tablet Take 2 tablets (650 mg) by mouth every 4 hours as needed for mild pain (Patient not taking: Reported on 3/19/2024) 60 tablet 0     aspirin (ASA) 81 MG chewable tablet Take 1 tablet (81 mg) by mouth daily 90 tablet 3     doxylamine (UNISOM) 25 MG TABS tablet Take 0.5 tablets (12.5 mg) by mouth at bedtime 20 tablet 1     famotidine (PEPCID) 20 MG tablet Take 1 tablet (20 mg) by mouth 2 times daily 30 tablet 1     ferrous sulfate (FEROSUL) 325 (65 Fe) MG tablet Take 1 tablet (325 mg) by mouth daily (with breakfast) 90 tablet 3         Maternal Breast Exam:  Breasts are soft and symmetrical with bilateral intact, everted nipples. She slept throughout each visit today, woke briefly to say she wanted to breastfeed and pump but fell asleep again while breastfeeding and didn't want to pump. ?    Breastfeeding/ Lactation History: Xiao shares she  her other children for 7 to 12 months each, had good milk supply.    Oral exam of baby:  Baby B has normal jaw and palate, good length of tongue palpable beyond lingual frenulum attachment; disorganized when sucking on finger and at breast (<24 hours old). Did not do oral exam on baby A.     Infant information: Both have been spitting up though improving throughout the day. Baby A has age appropriate output and weight loss (-6.1% from birthweight). Baby B has excellent diaper output though weight loss at 24 hours was -9.3% from birthweight. (Weights taken shortly  after visit)    Feeding History: Mom has chosen to formula feed by bottle, sleeping a lot today and declined pumping since transfer to [facility]. Their nurse shares that baby A is tolerating bottle feeds with active sucking minimal stimulation, occasional spitting up. Baby B has very uncoordinated suck, tongue thrusting sometimes and difficult to get him to latch and feed well with bottle. He's improved from 3 mL feed this morning to 12 mL this afternoon, occasional spitting up.      Feeding Assessment: Baby PRAKASH went to breast mom helped to latch him then fell asleep again. Did some sucking but only a few sucks at a time then rolled nipple out of mouth again. He did fairly well with bottle after taking 12 mL.      Education:   [] Potential feeding challenges of early term infants  [] Expected  feeding patterns in the first few days (pg. 38 of Your Guide to To Postpartum and  Care)/ the Second Night  [] Stages of milk production  [] Benefits of hand expression of colostrum  [] Early feeding cues   [x] Feeding frequency- encourage at least every 3 hours.     [] Benefits of feeding on cue  [] Benefits of skin to skin  [x] Breastfeeding positions  [x] Tips to get and maintain a deep latch  [] Nutritive vs.non-nutritive sucking  [] Gentle breast compressions as needed to enhance milk transfer  [] How to tell when baby is finished  [] How to tell if baby is getting enough  [] Expected  output  []  weight loss  [] Infant Feeding Log  [] [employer] [facility] Breastfeeding/Pumping videos  [] Signs breastfeeding is going well (comfortable latch, audible swallows, age appropriate output and weight loss)    [] Tips to prevent engorgement  [] Signs of engorgement  [] Tips to manage engorgement  [x] Hand expression and pumping recommendations (has declined to pump thus far)  [] Supplement recommendations   [] Satiety cues   [] [facility] breast pump part/infant feeding supplies cleaning recommendations  [] Inpatient  "breastfeeding support  [] Outpatient lactation resources and follow up recommendations    Handouts: none this visit and no education done (other than latch and encourage pumping to establish supply) since mom sleeping for duration of visit.    Home Breast Pump: unknown     Plan (Early Term): Frequent skin to skin, watch for early feeding cues and breastfeed on cue with goal of 8 to 12 feedings in 24 hours. Go no longer than 3 hours between feedings. Encourage breast compressions to enhance milk transfer when infants are at the breast.    Encourage hand expression after feedings until milk is in, and hands on pumping each time they get supplemental feed, at least 6 times daily to support \"full term\" milk supply. Supplement after breastfeeding with any expressed milk, additional supplements as ordered by provider. See orders after 9% weight loss for baby B and continue to monitor weight and feedings closely        Mai Gutierrez RN, IBCLC   Lactation Consultant  [facility] [phone]  Office: [phone]          "

## 2024-03-28 NOTE — ANESTHESIA POSTPROCEDURE EVALUATION
Patient: Xiao Vivas    Procedure: Procedure(s):   section       Anesthesia Type:  Spinal    Note:  Disposition: Inpatient   Postop Pain Control: Uneventful            Sign Out: Well controlled pain   PONV: No   Neuro/Psych: Uneventful            Sign Out: Acceptable/Baseline neuro status   Airway/Respiratory: Uneventful            Sign Out: Acceptable/Baseline resp. status   CV/Hemodynamics: Uneventful            Sign Out: Acceptable CV status; No obvious hypovolemia; No obvious fluid overload   Other NRE: NONE   DID A NON-ROUTINE EVENT OCCUR? No       Last vitals:  Vitals Value Taken Time   BP     Temp     Pulse     Resp     SpO2         Electronically Signed By: Spring Gardiner MD  2024  7:56 PM

## 2024-03-28 NOTE — PLAN OF CARE
Problem: Postpartum ( Delivery)  Goal: Effective Urinary Elimination  Outcome: Progressing  [id number]:  VSS and postpartum assessments WDL.  Up to bathroom and in room with steady gait, requiring some assist with in/out of bed and cares.  Bonding well with twin infants.  Breastfeeding and bottle/formula feeding is pt's plan.   x1 this afternoon with lactation consultant but fell asleep during.  Encouraged pt to pump, she declined and slept most of this shift.  Bottle/formula feeding twin infants with much encouragement and taking increasing amounts throughout the shift, continue to assist with feedings and aim to increase volumes.  Pain managed with tylenol and toradol per MAR.  Incisional bandage clean, dry and intact.  Pt voided x2 after removal of guerrero catheter but flushed 2nd urine, will get another measured volume.  SisterDanny present and supportive.  Will continue with postpartum cares and education per plan of care.

## 2024-03-29 LAB
CREAT SERPL-MCNC: 0.45 MG/DL (ref 0.51–0.95)
EGFRCR SERPLBLD CKD-EPI 2021: >90 ML/MIN/1.73M2
PLATELET # BLD AUTO: 148 10E3/UL (ref 150–450)

## 2024-03-29 PROCEDURE — 250N000013 HC RX MED GY IP 250 OP 250 PS 637

## 2024-03-29 PROCEDURE — 250N000011 HC RX IP 250 OP 636

## 2024-03-29 PROCEDURE — 36415 COLL VENOUS BLD VENIPUNCTURE: CPT

## 2024-03-29 PROCEDURE — 120N000002 HC R&B MED SURG/OB UMMC

## 2024-03-29 PROCEDURE — 85049 AUTOMATED PLATELET COUNT: CPT

## 2024-03-29 RX ORDER — SIMETHICONE 80 MG
80 TABLET,CHEWABLE ORAL 4 TIMES DAILY
Status: DISCONTINUED | OUTPATIENT
Start: 2024-03-29 | End: 2024-03-30 | Stop reason: HOSPADM

## 2024-03-29 RX ORDER — LIDOCAINE 4 G/G
2 PATCH TOPICAL
Status: DISCONTINUED | OUTPATIENT
Start: 2024-03-29 | End: 2024-03-30 | Stop reason: HOSPADM

## 2024-03-29 RX ADMIN — SIMETHICONE 80 MG: 80 TABLET, CHEWABLE ORAL at 20:00

## 2024-03-29 RX ADMIN — SENNOSIDES AND DOCUSATE SODIUM 2 TABLET: 8.6; 5 TABLET ORAL at 07:47

## 2024-03-29 RX ADMIN — ACETAMINOPHEN 975 MG: 325 TABLET, FILM COATED ORAL at 13:33

## 2024-03-29 RX ADMIN — IBUPROFEN 800 MG: 800 TABLET, FILM COATED ORAL at 22:43

## 2024-03-29 RX ADMIN — IBUPROFEN 800 MG: 800 TABLET, FILM COATED ORAL at 03:33

## 2024-03-29 RX ADMIN — LIDOCAINE 2 PATCH: 4 PATCH TOPICAL at 09:34

## 2024-03-29 RX ADMIN — ACETAMINOPHEN 975 MG: 325 TABLET, FILM COATED ORAL at 07:01

## 2024-03-29 RX ADMIN — SIMETHICONE 80 MG: 80 TABLET, CHEWABLE ORAL at 16:54

## 2024-03-29 RX ADMIN — ENOXAPARIN SODIUM 40 MG: 40 INJECTION SUBCUTANEOUS at 20:00

## 2024-03-29 RX ADMIN — SIMETHICONE 80 MG: 80 TABLET, CHEWABLE ORAL at 09:33

## 2024-03-29 RX ADMIN — IBUPROFEN 800 MG: 800 TABLET, FILM COATED ORAL at 16:54

## 2024-03-29 RX ADMIN — IBUPROFEN 800 MG: 800 TABLET, FILM COATED ORAL at 09:34

## 2024-03-29 RX ADMIN — ACETAMINOPHEN 975 MG: 325 TABLET, FILM COATED ORAL at 19:59

## 2024-03-29 RX ADMIN — SIMETHICONE 80 MG: 80 TABLET, CHEWABLE ORAL at 11:55

## 2024-03-29 RX ADMIN — SENNOSIDES AND DOCUSATE SODIUM 1 TABLET: 8.6; 5 TABLET ORAL at 19:59

## 2024-03-29 RX ADMIN — ACETAMINOPHEN 975 MG: 325 TABLET, FILM COATED ORAL at 01:05

## 2024-03-29 ASSESSMENT — ACTIVITIES OF DAILY LIVING (ADL)
ADLS_ACUITY_SCORE: 21
ADLS_ACUITY_SCORE: 18
ADLS_ACUITY_SCORE: 18
ADLS_ACUITY_SCORE: 22
ADLS_ACUITY_SCORE: 18
ADLS_ACUITY_SCORE: 18
ADLS_ACUITY_SCORE: 21
ADLS_ACUITY_SCORE: 22
ADLS_ACUITY_SCORE: 18
ADLS_ACUITY_SCORE: 21
ADLS_ACUITY_SCORE: 21
ADLS_ACUITY_SCORE: 18
ADLS_ACUITY_SCORE: 21
ADLS_ACUITY_SCORE: 18
ADLS_ACUITY_SCORE: 21

## 2024-03-29 NOTE — PLAN OF CARE
Problem: Postpartum ( Delivery)  Goal: Successful Parent Role Transition  Outcome: Progressing  Intervention: Support Parent Role Transition  Recent Flowsheet Documentation  Taken 3/29/2024 1347 by Farzaneh Rodarte, RN  Supportive Measures: active listening utilized  Parent-Child Attachment Promotion: caring behavior modeled  Goal: Hemostasis  Outcome: Progressing  Goal: Optimal Pain Control and Function  Outcome: Progressing  Intervention: Prevent or Manage Pain  Recent Flowsheet Documentation  Taken 3/29/2024 1347 by Farzaneh Rodarte, RN  Pain Management Interventions: medication (see MAR)     Assessment complete and WDL. VSS. Pt up ad branden, voiding WDL. Pt encouraged to get OOB and ambulate to help with gas pain and first bowel movement. Pt taking scheduled tylenol and ibuprofen plus simethicone for pain. Pt not breastfeeding this shift.     Continue POC.

## 2024-03-29 NOTE — PROGRESS NOTES
[facility]   Post-partum Progress Note    Name:  Xiao Vivas  MRN: [id number]    S: Patient is feeling well this morning, however noting some abdominal pain related to gas. Had episode of emesis last night that improved with Reglan. Has had some sips of water without N/V, however has not had anything to eat. Standing feeding her twins this morning. Passing some flatus. Offered bandage removal, but she will shower and take it off when it is wet.   O:   Patient Vitals for the past 24 hrs:   BP Temp Temp src Pulse Resp SpO2   24 0050 106/68 99.3  F (37.4  C) Oral 81 16 --   24 1545 95/60 97.9  F (36.6  C) Oral 80 16 --   24 1130 105/66 -- -- 79 16 100 %   24 0730 109/78 98.5  F (36.9  C) Oral 82 16 100 %     Gen:  Resting comfortably, NAD  CV:  RR, well perfused  Pulm:  Non-labored breathing on room air  Abd:  Soft, appropriately ttp, mildly distended, minimal bowel sounds. Fundus at umbilicus, firm and non-tender.   Incision:  Covered in sterile bandage which is clean, dry, intact with no shadowing. No erythema, drainage or induration   Ext:  non-tender, trace edema to bilateral lower extremities    I/O last 3 completed shifts:  In: 800 [P.O.:800]  Out: 2475 [Urine:2475]      Assessment/Plan:  Xiao Vivas 29 year old  on POD#2 s/p PLTCS in the setting of Di-Di Twin pregnancy, FGR and elevated UAD.    # Postpartum management  Pain: Fairly well-controlled with tylenol, NSAIDS and oxycodone PRN. Will add lidocaine patch and simethicone  GI:  PRN bowel regimen, anti-emetics  : Voiding spontaneously  Hgb: 11> EBL 1662 (mthg, TXA) > 10.6> 8.7 Asymptomatic from blood loss anemia; Discussed IV iron tday and she will think about it. Will discharge with oral iron.   Rh: Positive  Rubella: immune  Feed: Breastfeeding. No issues noted this morning  BC: Declines as her  does not live here.   PPx:  Encourage ambulation, IS, SCDs while confined to bed;  Lovenox ordered in the setting of twins and PPH    # gTCP  - Platelets 94>103 > 148    Dispo: Anticipate discharge home on POD#3-4    Attending Addendum  I personally examined and evaluated this patient.  I discussed the patient with the resident/fellow and care team, and agree with the assessment and plan of care as documented in the note.     Key findings:  at POD#2 after PCS for di di twins with FGR (presenting twin also with malpresentation). She experienced a PPH due to atony and is tolerating ABLA without symptoms. Anticipate discharge tomorrow.    Xena Lewis MD  Date of Service (when I saw the patient): 24

## 2024-03-29 NOTE — PLAN OF CARE
Goal Outcome Evaluation:       VSS, Pt is stable throughout the shift. Pt is voiding without difficulty, up ad branden, passing gas, eating and drinking normally. Incision dressing is clean dry and intact, lochia is scant. no s/s infection. Pt was sleeping vomiting last night, medication was given after that pt was sleeping until this morning. Recommending to breastfeed and breast pump but pt is refuse and want to sleep.   Taking tylenol and ibuprofen  for pain. Pt's Platelet was yesterday 103 and provider order Lovenox, notified the Dr Delaney Norton that the platelet is 103 and provider said to give lovenox and it was given last night. Education provided on. Pt is agreeable with her plan of care. Positive attachment behaviors observed with infant. Pt is mother and sister arepresent. Will continue with plan of care.

## 2024-03-30 VITALS
OXYGEN SATURATION: 100 % | WEIGHT: 144.3 LBS | BODY MASS INDEX: 24.94 KG/M2 | HEART RATE: 103 BPM | RESPIRATION RATE: 16 BRPM | DIASTOLIC BLOOD PRESSURE: 66 MMHG | TEMPERATURE: 98 F | SYSTOLIC BLOOD PRESSURE: 108 MMHG

## 2024-03-30 PROCEDURE — 250N000013 HC RX MED GY IP 250 OP 250 PS 637

## 2024-03-30 RX ORDER — AMOXICILLIN 250 MG
1 CAPSULE ORAL 2 TIMES DAILY
Qty: 60 TABLET | Refills: 0 | Status: SHIPPED | OUTPATIENT
Start: 2024-03-30 | End: 2024-07-08

## 2024-03-30 RX ORDER — ACETAMINOPHEN 325 MG/1
975 TABLET ORAL EVERY 6 HOURS
Qty: 60 TABLET | Refills: 0 | Status: SHIPPED | OUTPATIENT
Start: 2024-03-30 | End: 2024-07-08

## 2024-03-30 RX ORDER — OXYCODONE HYDROCHLORIDE 5 MG/1
5 TABLET ORAL EVERY 4 HOURS PRN
Qty: 3 TABLET | Refills: 0 | Status: SHIPPED | OUTPATIENT
Start: 2024-03-30 | End: 2024-07-08

## 2024-03-30 RX ORDER — IBUPROFEN 800 MG/1
800 TABLET, FILM COATED ORAL EVERY 6 HOURS
Qty: 60 TABLET | Refills: 0 | Status: SHIPPED | OUTPATIENT
Start: 2024-03-30 | End: 2024-07-08

## 2024-03-30 RX ORDER — FAMOTIDINE 20 MG/1
20 TABLET, FILM COATED ORAL 2 TIMES DAILY
Status: DISCONTINUED | OUTPATIENT
Start: 2024-03-30 | End: 2024-03-30 | Stop reason: HOSPADM

## 2024-03-30 RX ADMIN — ACETAMINOPHEN 975 MG: 325 TABLET, FILM COATED ORAL at 06:58

## 2024-03-30 RX ADMIN — SIMETHICONE 80 MG: 80 TABLET, CHEWABLE ORAL at 15:46

## 2024-03-30 RX ADMIN — ACETAMINOPHEN 975 MG: 325 TABLET, FILM COATED ORAL at 13:27

## 2024-03-30 RX ADMIN — SENNOSIDES AND DOCUSATE SODIUM 2 TABLET: 8.6; 5 TABLET ORAL at 08:36

## 2024-03-30 RX ADMIN — ACETAMINOPHEN 975 MG: 325 TABLET, FILM COATED ORAL at 01:07

## 2024-03-30 RX ADMIN — IBUPROFEN 800 MG: 800 TABLET, FILM COATED ORAL at 04:59

## 2024-03-30 RX ADMIN — FAMOTIDINE 20 MG: 20 TABLET ORAL at 08:36

## 2024-03-30 RX ADMIN — IBUPROFEN 800 MG: 800 TABLET, FILM COATED ORAL at 11:11

## 2024-03-30 RX ADMIN — SIMETHICONE 80 MG: 80 TABLET, CHEWABLE ORAL at 12:47

## 2024-03-30 RX ADMIN — LIDOCAINE 2 PATCH: 4 PATCH TOPICAL at 08:43

## 2024-03-30 ASSESSMENT — ACTIVITIES OF DAILY LIVING (ADL)
ADLS_ACUITY_SCORE: 18

## 2024-03-30 NOTE — PLAN OF CARE
Goal Outcome Evaluation:         Data: Vital signs within normal limits. Postpartum checks within normal limits - see flow record. Patient eating and drinking normally. Patient able to empty bladder independently and is up ambulating. No apparent signs of infection. Incision healing well. Patient performing self cares and is able to care for infant.  Action: Patient medicated during the shift for pain and cramping. See MAR. Patient reassessed within 1 hour after each medication and pain was improved - patient stated she was comfortable. Patient education done about discharge education (declined ), medications and follow up. See flow record.  Response: Positive attachment behaviors observed with infant. Support persons family present.   Plan: Anticipate discharge today at 1600.

## 2024-03-30 NOTE — PLAN OF CARE
Vital signs stable. Postpartum assessment WDL. Incision clean, dry, intact and open to air. Pain controlled with tylenol and ibuprofen. Patient ambulating independently. Pt voiding independently. Patient reports gas pain and is taking scheduled simethicone. Pt formula feeding infants via bottle on cue. Patient and infants bonding well. Will continue with current plan of care.

## 2024-03-30 NOTE — PROGRESS NOTES
Post  Anesthesia Follow Up Note    Patient: Xiao Vivas    Patient location: Postpartum floor.      Procedure(s) Performed:  Procedure(s):   section    Anesthesia type: Spinal Block    Subjective:     Pain is well controlled     Additional ROS:  She does not complain of pruritis at this time. She denies weakness, denies paresthesia, denies difficulties breathing or voiding, denies nausea or vomiting. She is able to ambulate and tolerates regular diet.    Objective:  Vitals stable      Last Vitals: /66   Pulse 103   Temp 36.7  C (98  F) (Axillary)   Resp 16   Wt 65.5 kg (144 lb 4.8 oz)   LMP 2023 (Exact Date)   SpO2 100%   Breastfeeding Unknown   BMI 24.94 kg/m      Assessment and plan:   Xiao Vivas is a 29 year old female  POD #3 s/p  and single shot TAP nerve block injections. There is no evidence of adverse side effects associated with spinal and nerve block injections.    Pain is well controlled.    Thank you for including us in the care for this patient.    Dwayne Perdomo MD   CA-1  Anesthesia

## 2024-03-30 NOTE — DISCHARGE INSTRUCTIONS
Warning Signs after Having a Baby    Keep this paper on your fridge or somewhere else where you can see it.    Call your provider if you have any of these symptoms up to 12 weeks after having your baby.    Thoughts of hurting yourself or your baby  Pain in your chest or trouble breathing  Severe headache not helped by pain medicine  Eyesight concerns (blurry vision, seeing spots or flashes of light, other changes to eyesight)  Fainting, shaking or other signs of a seizure    Call 9-1-1 if you feel that it is an emergency.     The symptoms below can happen to anyone after giving birth. They can be very serious. Call your provider if you have any of these warning signs.    My provider s phone number: _______________________    Losing too much blood (hemorrhage)    Call your provider if you soak through a pad in less than an hour or pass blood clots bigger than a golf ball. These may be signs that you are bleeding too much.    Blood clots in the legs or lungs    After you give birth, your body naturally clots its blood to help prevent blood loss. Sometimes this increased clotting can happen in other areas of the body, like the legs or lungs. This can block your blood flow and be very dangerous.     Call your provider if you:  Have a red, swollen spot on the back of your leg that is warm or painful when you touch it.   Are coughing up blood.     Infection    Call your provider if you have any of these symptoms:  Fever of 100.4 F (38 C) or higher.  Pain or redness around your stitches if you had an incision.   Any yellow, white, or green fluid coming from places where you had stitches or surgery.    Mood Problems (postpartum depression)    Many people feel sad or have mood changes after having a baby. But for some people, these mood swings are worse.     Call your provider right away if you feel so anxious or nervous that you can't care for yourself or your baby.    Preeclampsia (high blood pressure)    Even if you  didn't have high blood pressure when you were pregnant, you are at risk for the high blood pressure disease called preeclampsia. This risk can last up to 12 weeks after giving birth.     Call your provider if you have:   Pain on your right side under your rib cage  Sudden swelling in the hands and face    Remember: You know your body. If something doesn't feel right, get medical help.     For informational purposes only. Not to replace the advice of your health care provider. Copyright 2020 [facility]. All rights reserved. Clinically reviewed by Tereza Rosado, RNC-OB, MSN. iFollo [id number] - Rev 02/23.

## 2024-03-30 NOTE — PROGRESS NOTES
[facility]   Post-partum Progress Note    Name:  Xiao Vivas  MRN: [id number]    S: Patient is feeling well this morning, no acute concerns. Had some blurry vision overnight, but denies headaches, chest pain, SOB or RUQ tenderness. She is breast feeding. Voiding without difficulty and passing flatus. Ambulating without dizziness.    O:   Patient Vitals for the past 24 hrs:   BP Temp Temp src Pulse Resp   24 0835 108/66 98  F (36.7  C) Axillary 103 --   24 0206 103/70 99  F (37.2  C) Oral 78 --   24 1801 105/57 98.3  F (36.8  C) Oral 81 16   24 1347 92/77 98.9  F (37.2  C) Oral 94 15     Gen:  Resting comfortably, NAD  CV:  regular rate/rhythm, well perfused  Pulm:  Non-labored breathing on room air  Abd:  Soft, appropriately ttp, mildly distended, minimal bowel sounds. Fundus at umbilicus, firm and non-tender.   Incision:  clean, dry, intact  Ext:  non-tender, 1+ edema to bilateral lower extremities        Assessment/Plan:  Xiao Vivas 29 year old  on POD#3 s/p PLTCS in the setting of Di-Di Twin pregnancy, FGR and elevated UAD. She is meeting all goals and would like to discharge today.    # Postpartum management  Pain: Fairly well-controlled with tylenol, NSAIDS and oxycodone PRN.   GI:  PRN bowel regimen, anti-emetics  : Voiding spontaneously  Hgb: 11> EBL 1662 (mthg, TXA) > 10.6> 8.7 Asymptomatic from blood loss anemia; Discussed IV iron tday and she will think about it. Will discharge with oral iron.   Rh: Positive  Rubella: immune  Feed: Breastfeeding. No issues noted this morning  BC: Declines as her  does not live here.   PPx:  Encourage ambulation, IS, SCDs while confined to bed; Lovenox ordered in the setting of twins and PPH    # gTCP  - Platelets 94>103 > 148    Dispo: Anticipate discharge home on POD#3    Delaney Norton MD  Ob/Gyn Resident, PGY-2  3/30/2024 10:03 AM       Appreciate note by Dr. Norton. Patient has been  seen and examined by me separate from the resident, agree with above note. Discharge home today, instructions reviewed.     Mehreen Wynne MD  12:27 PM

## 2024-04-01 ENCOUNTER — TELEPHONE (OUTPATIENT)
Dept: OBGYN | Facility: CLINIC | Age: 29
End: 2024-04-01
Payer: COMMERCIAL

## 2024-04-01 NOTE — TELEPHONE ENCOUNTER
----- Message from Mehreen Wynne MD sent at 3/30/2024 12:27 PM CDT -----  Regarding: incision check  Can you schedule her for incision check in 2 weeks? Thanks!

## 2024-04-23 ENCOUNTER — PRENATAL OFFICE VISIT (OUTPATIENT)
Dept: OBGYN | Facility: CLINIC | Age: 29
End: 2024-04-23
Attending: ADVANCED PRACTICE MIDWIFE
Payer: COMMERCIAL

## 2024-04-23 VITALS
SYSTOLIC BLOOD PRESSURE: 106 MMHG | DIASTOLIC BLOOD PRESSURE: 71 MMHG | HEART RATE: 74 BPM | BODY MASS INDEX: 23.66 KG/M2 | WEIGHT: 136.9 LBS

## 2024-04-23 PROCEDURE — 99024 POSTOP FOLLOW-UP VISIT: CPT | Performed by: ADVANCED PRACTICE MIDWIFE

## 2024-04-23 PROCEDURE — G0463 HOSPITAL OUTPT CLINIC VISIT: HCPCS | Performed by: ADVANCED PRACTICE MIDWIFE

## 2024-04-23 NOTE — PROGRESS NOTES
S: Xiao Vivas is a 29 year old  here for postpartum visit.    Pt is s/p primary low transverse  section on 3/27/24- Di/Di twin boys. C/b fetal growth restriction. Postpartum     Reports she is feeling well. Vaginal bleeding has decreased. Feeling incision is healing well. Has some itching. Notes steristrips are still on- would like them removed. Normal voiding and BM.    Baby boys are healthy. Breast and bottle feeding with formula. Interested in lactation consultant. No nipple breast concerns.    Reports mood is good. Feels well supported. Denies SI/HI or thoughts of self harm.    Unsure about contraception. Would like to review options. Questions about return to fertility after stopping.    Plan pap postpartum.      O: /71   Pulse 74   Wt 62.1 kg (136 lb 14.4 oz)   LMP 2023 (Exact Date)   Breastfeeding Yes   BMI 23.66 kg/m      Exam:  Abdomen: soft, nontender, uterus involuting, incision with steristrips- removed, cd&I, well approximated, healing well      A: (Z39.2) Routine postpartum follow-up  (primary encounter diagnosis)    P:  - Steri strips removed.  - Reviewed contraception options and answered pt questions. Verbal and written information given.  Pt will consider options.  - Information given for Maribell Coffey, lactation.  - Plan pap postpartum.    RTC in 2-3 weeks for postpartum visit.     EDY Jimenez, ELLYN

## 2024-04-24 LAB
PATH REPORT.COMMENTS IMP SPEC: NORMAL
PATH REPORT.COMMENTS IMP SPEC: NORMAL
PATH REPORT.FINAL DX SPEC: NORMAL
PATH REPORT.GROSS SPEC: NORMAL
PATH REPORT.MICROSCOPIC SPEC OTHER STN: NORMAL
PATH REPORT.RELEVANT HX SPEC: NORMAL
PHOTO IMAGE: NORMAL

## 2024-04-28 PROBLEM — O30.041 DICHORIONIC DIAMNIOTIC TWIN PREGNANCY IN FIRST TRIMESTER: Status: RESOLVED | Noted: 2023-09-20 | Resolved: 2024-04-28

## 2024-04-30 ENCOUNTER — TELEPHONE (OUTPATIENT)
Dept: PEDIATRICS | Facility: CLINIC | Age: 29
End: 2024-04-30
Payer: COMMERCIAL

## 2024-04-30 DIAGNOSIS — O92.70 LACTATION PROBLEM: Primary | ICD-10-CM

## 2024-06-19 DIAGNOSIS — O92.70 LACTATION PROBLEM: Primary | ICD-10-CM

## 2024-06-27 ENCOUNTER — TELEPHONE (OUTPATIENT)
Dept: OBGYN | Facility: CLINIC | Age: 29
End: 2024-06-27
Payer: COMMERCIAL

## 2024-06-28 ENCOUNTER — PRENATAL OFFICE VISIT (OUTPATIENT)
Dept: OBGYN | Facility: CLINIC | Age: 29
End: 2024-06-28
Attending: ADVANCED PRACTICE MIDWIFE
Payer: COMMERCIAL

## 2024-06-28 VITALS
SYSTOLIC BLOOD PRESSURE: 108 MMHG | DIASTOLIC BLOOD PRESSURE: 73 MMHG | BODY MASS INDEX: 25.95 KG/M2 | HEART RATE: 82 BPM | HEIGHT: 64 IN | WEIGHT: 152 LBS

## 2024-06-28 DIAGNOSIS — O92.79 INADEQUATE MILK PRODUCTION: ICD-10-CM

## 2024-06-28 LAB — HGB BLD-MCNC: 12.8 G/DL

## 2024-06-28 PROCEDURE — 85018 HEMOGLOBIN: CPT | Performed by: ADVANCED PRACTICE MIDWIFE

## 2024-06-28 PROCEDURE — 99213 OFFICE O/P EST LOW 20 MIN: CPT | Performed by: ADVANCED PRACTICE MIDWIFE

## 2024-06-28 PROCEDURE — G0463 HOSPITAL OUTPT CLINIC VISIT: HCPCS | Performed by: ADVANCED PRACTICE MIDWIFE

## 2024-06-28 ASSESSMENT — PAIN SCALES - GENERAL: PAINLEVEL: NO PAIN (0)

## 2024-06-28 NOTE — PATIENT INSTRUCTIONS
Thank you for trusting us with your care!     If you need to contact us for questions about:  Symptoms, Scheduling & Medical Questions; Non-urgent (2-3 day response) Jesus message, Urgent (needing response today) [phone] (if after 3:30pm next day response)   Prescriptions: Please call your Pharmacy   Billing: [place] [phone] or [facility]:[phone]

## 2024-06-28 NOTE — NURSING NOTE
SUBJECTIVE:   Xiao Vivas is here for her 6-week postpartum checkup.     PHQ-9 score: edinburgh 0  Hx of Abuse:  No    Delivery Date: 3/27/24.    Delivering provider:  Xena Lewis MD.    Type of delivery:  .     Delivery complications: FGR twin B, increased umbilical artery resistance, iron deficiency anemia, twin breech, gestational thrombocytopenia  Infant gender:  twin boys, weight 6 pounds 7 oz. And 4 lb 5.8 oz  Feeding Method:   and Bottlefed.  Complications reported with feeding:  inadequate milk supply.    Bleeding:  Moderate.  Duration:  4 days.  Menses resumed:  No  Bowel/Urinary problems:  No    Contraception Planned:  None -- is she planning pregnancy? no  She  has not had intercourse since delivery..

## 2024-06-28 NOTE — PROGRESS NOTES
"Nursing Notes:   Domonique Bradshaw  2024  1:54 PM  Sign at exiting of workspace  SUBJECTIVE:   Xiao Vivas is here for her 6-week postpartum checkup.       Hx of Abuse:  No    Delivery Date: 3/27/24.    Delivering provider:  Xena Lewis MD.    Type of delivery:  .     Delivery complications: FGR twin B, increased umbilical artery resistance, iron deficiency anemia, twin breech, gestational thrombocytopenia  Infant gender:  twin boys, weight 6 pounds 7 oz. And 4 lb 5.8 oz  Feeding Method:   and Bottlefed.  Complications reported with feeding:  inadequate milk supply.    Bleeding:  Moderate.  Duration:  4 days.  Menses resumed:  No  Bowel/Urinary problems:  No    Contraception Planned:  None -- is she planning pregnancy? no  She  has not had intercourse since delivery..       Worried about decreased milk supply, asking for hospitals grade breast pump and lactation services    ================================================================    EXAM:  /73   Pulse 82   Ht 1.62 m (5' 3.78\")   Wt 68.9 kg (152 lb)   Breastfeeding Yes   BMI 26.27 kg/m      General: healthy, alert, and no distress  Psych: NEGATIVE  Last PHQ-9 score on record= No Value exists for the : HP#PHQ9  Breasts:  Lactating, Nipples intact with no lesions, Non-tender, and No S/S of yeast or mastitis  Abdomen: Benign, Soft, flat, non-tender, No masses, organomegaly, and Diastasis less than 1-2 FB  Incision:  well healed       ASSESSMENT:   Encounter Diagnoses   Name Primary?    Routine postpartum follow-up Yes    Inadequate milk production       Normal postpartum exam after CS for twins, fetal complications  Pregnancy was complicated by:  Fetal distress.      PLAN:  Orders Placed This Encounter   Procedures    Hemoglobin POCT    Breast Pump Order for DME - ONLY FOR DME      Orders Placed This Encounter   Medications    cholecalciferol (VITAMIN D3) 125 mcg (5000 units) capsule      Discussed and recommended " pap smear, gave info, will come back in 2 weeks for pap  Counseled about birth control  Risks and benefits of prescribed medications discussed.  Medication instructions reviewed.  Contraception methods discussed  Discussed calcium intake, vitamins and supplements including Vitamin D  Exercise encouraged  Appointment with Maribell Pettit, DNP, APRN, CNM, FACNM

## 2024-07-08 ENCOUNTER — TELEPHONE (OUTPATIENT)
Dept: OBGYN | Facility: CLINIC | Age: 29
End: 2024-07-08
Payer: COMMERCIAL

## 2024-07-08 NOTE — TELEPHONE ENCOUNTER
Patient went to DME to  prescribed breast pump, was told she needed to call her insurance to ensure coverage.  Patient stated her insurance stated she would be covered, but needed her provider to speak with the insurance company. Patient was unsure what insurance needed to speak with provider about.    Routing to RNs for next steps

## 2024-07-08 NOTE — TELEPHONE ENCOUNTER
Tried to reach dragan with assist of [facility] , but received voicemail.  Left message to call back.

## 2024-07-09 NOTE — PROGRESS NOTES
"**E[place]ian  was offered x2 and declined x2**    Assessment:    3 1/2 month old twin infants gaining weight rapidly, primariliy formula-feeding:  Aarif 1.5 oz/day, Harley 1.3 oz/day  Both babies with difficulty latching to breast, likely due to frequency of bottlefeeding combined with low milk supply.  Harley able to latch briefly and transfer small amount;  Aarif did not latch to breast at all  Both twins in need of full supplementation while mother attempts to increase milk supply  Mother with low milk supply, likely due to early supplementation with large amounts of formula, as well as stress of large family    Plan:    Possible causes of low milk supply can include not enough breast stimulation (especially in early days), too early or too much formula supplementation, hormonal dysfunction, baby not being able to suck well, or insufficient glandular tissue in mother.  In your case, it is probably not having enough time to feed your babies as often as they need it and pump to get extra for bottles--it can be very challenging to take all of this time for making milk when you have a large family.  Evaluation of low milk supply can involve a breast exam, trying more frequent pumping to see if this improves supply, or bloodwork such as thyroid and prolactin evaluation.  Thyroid imbalance can affect milk supply and that is is treatable.  Prolactin is the hormone of milk-making, and does not  correlate exactly with milk supply, but can sometimes give some information about a possible reason for low milk supply.  There is no \"prolactin supplement\" to take if one's prolactin is low, however, and most of the things to do to increase it are the things people are already doing, such as frequent nursing or pumping.  Today we decided to do both of these tests.  Pump your breasts as often as you can. Sometimes pumping while you have some warmth on your breasts (like a heating pad or microwaved hot pack) is helpful, and " gentle massage can also help release more milk.   It is more effective to pump more frequently for shorter time periods than it is to pump less often for longer:  For example, it is better to pump 6 times/day for 15 minutes each than it is to pump 3 times/day for 30 minutes.   Use the pump that you have, or if you would like to rent a hospital-grade pump you can do that for about $88/month.  You can also use your hand pump or your Spectra pump if that works best for you.  There are several supplements and medications that can be used to increase milk supply.  Moringa, goat's rue and fenugreek are some that people have found to be helpful and have some research evidence supporting their use.  Regarding medications for milk supply, domperidone is one such medication, but this is not available in the [place]. The other option is a prescription for Reglan;  some people find this helpful and some do not.  We use it for only two weeks, as it does have the potential for worsening depression in people who have that history, and with long term use can also cause abnormal muscle twitching.    This is something that you could consider, if you try the other ideas and they do not work for you.  Continue to offer the babies your breast as often as you can--sometimes offering when the babies are sleepy is helpful, or when they are just a little bit hungry.  The more times a day you stimulate your breasts, the more milk you will make.  Discussed the importance of paced bottlefeeding;  demonstrated today.  Follow up with lactation as needed, and pediatric provider as planned.  The [employer] can be used for brief questions, but it's important to know that messages are not seen Friday through Sunday. If urgent help is needed, Monday through Friday you can call [phone] and one of our lactation consultants will get the message and respond; if you need a rapid response over a weekend or holiday, it is best to call your on-call maternity or  pediatric provider.  Please feel free to schedule a return visit if the concern is more detailed;  telephone visits are also an option if you don't feel you need to be seen in person.     Subjective: Xiao is here today referred by JULEE Pettit CNM, because of concerns with milk supply.  Xiao reports that breastfeeding her twins went well during first month, but then babies began crying after feeding, which caused her concern about low milk supply.  Babies were soothed by a formula bottle, so Xiao presumed that her milk supply was inadequate and began adding formula more regularly.  She is currently offering babies the breast about twice/day and does hear them swallowing much of the time;  otherwise is formula feeding.  She shares that she does not offer breast more frequently because she feels there is no milk.  Xiao is pumping 2-3 times/day,  yielding less than one ounce/session.  Has a Medela pump but feels this is ineffective;  has older Spectra pump as well as manual pump, both of which work fairly well, and she is also quite confident with hand expression.  Would like to use hospital grade pump and this was ordered by primary CNM, but as she has already received a new pump through insurance for this birth, rental of hospital grade pump will not be covered by insurance.  She is considering paying out of pocket for this.  Xiao has also tried a number of home remedies for increasing milk supply, including oatmeal, electrolyte drinks, and supplements containing goat's rue, milk thistle, shatavari, fennel, alfalfa, fenugreek, coriander and anise, but has not found these particularly effective.     Hospital Course:  for FGR in baby B (Shady) with Baby A breech;  birth complicated by hematoma and PPH with EBL of 1600 ml.  Seen by hospital IBCLC who noted Shady to have disorganized suck;  Xiao very fatigued during visit, advised to pump but too tired.  Primarily formula feeding prior to  discharge.    Mother's Relevant Med/Surg History: Dichorionic/diamniotic twins with FGR of Twin B this pregnancy;  thrombocytopenia;  anemia    Breast surgery:  none    Breastfeeding Goals: to increase milk supply;  ideally to breastfeed until age 2    Previous Breastfeeding Experience:  Four previous children, all  for 7 - 12 months with no difficulty    Objective/Physical exam:     Mother: Noticed breasts grew larger and areolas darkened during pregnancy and she noticed primary engorgement when her milk came in.     Her nipples are everted, the areola is compressible, the breast is soft and full.     Pumpin-3 times/day, yielding 2-3 oz each day    Sore nipples:       Infants' birthday: [birth year]  Mode of delivery:   Gestational age: 37w2d  [facility]    Baby A:       Infant's name: Harley Lancaster   Infant's birth weight: 6 # 7 oz   Discharge weight: 5 # 14 oz   Recent weights:    24:  6 # 9.5 oz  24:  7 # 14.5 oz  24:  12# 2.5 oz    Frequency and duration of feedings: 2 at breast, plus about 8 bottles  Swallows audible per mother: yes  Numbers of feedings in 24 hours: 10  Number urines per day: 8  Number of stools per day and their color: 2, yellow brown    Supplementation: 4 oz formula each feeding     Assessment of infant: 31.39% Weight for age percentile   Age today: 3 1/2 months  Today's weight: 13# 14 oz  Amount of milk transferred:  0.2 oz    Baby has full flexion of arms and legs, normal tone, behavior is alert and active, respirations are normal, skin is normal, hydration is normal, jaw is normal size and alignment, palate is narrow and somewhat high-arched, frenulum is normal, baby can lateralize tongue, has adequate tongue lift, and tongue can protrude past bottom gum line. Noted that baby has distinct preference for left-sided head turn.    Suck exam:  Baby did not suck well on examining finger, developmentally appropriate    Baby  thrush: none    Jaundice: none      Feeding assessment: Baby latched briefly to the breast, but did not sustain latch for full feeding    Alignment: The baby was flex relaxed. Baby's head was aligned with its trunk. Baby did face mother. Baby was in cradle position today.   Areolar Grasp: Baby was able to open mouth widely. Baby's lips were not pursed. Baby's lips did flange outward. Tongue was visible over bottom gum. Baby had complete seal for a short period.    Areolar Compression: Baby made a brief period of rhythmic motion. There were no clicking or smacking sounds. There was no severe nipple discomfort. Nipples appeared rounded after feeding.    Audible swallowing: Baby made a few quiet sounds of swallowing: There was an increase in frequency after milk ejection reflex. The milk ejection reflex is indeterminate and milk supply is low.      Baby B:    Infant's name: Pina Lancaster   Infant's birth weight: 4 # 5.8 oz   Discharge weight: 4 # 0.9 oz   4/17/24:  5 # 1.5 oz  4/30/24:  6 # 5 oz  6/19/24:  10 lb 4.5 oz     Frequency and duration of feedings: 2 at breast, plus about 8 bottles  Swallows audible per mother: yes, when at breast  Numbers of feedings in 24 hours: 10  Number urines per day: 8  Number of stools per day and their color: 2,  yellow    Supplementation: with 4 oz formula every feeding     Assessment of infant: 5.83% Weight for age percentile   Age today: 3 1/2 months  Today's weight: 12 # 3.4 oz   Amount of milk transferred:  none.  Baby not cuing to feed and did not show interest in breast or willingness to latch.    Baby has full flexion of arms and legs, normal tone, behavior is alert and active, respirations are normal, skin is normal, hydration is normal, jaw is normal size and alignment, palate is normal, frenulum is normal, baby can lateralize tongue, has adequate tongue lift, and tongue can protrude past bottom gum line. Pref turn head to left, narrow arched palate,     Suck exam:  Baby did  not suck well on examining finger, developmentally appropriate    Baby thrush: none    Jaundice: none      Feeding assessment: Baby did not hold suction with tongue while at the breast.  After several attempts, offered nipple shield as transition back to breastfeeding from bottle, but this was not successful.  Filled nipple shield with milk using hand expression;  baby did not latch.    Alignment: The baby was flex relaxed. Baby's head was aligned with its trunk. Baby did face mother. Baby was in cradle position today.     Areolar Grasp: Baby was able to open mouth wide but did not grasp breast.      Areolar Compression: Baby made no rhythmic motion.     Audible swallowing: Baby made no quiet sounds of swallowing: There was no noted increase in frequency after milk ejection reflex. The milk ejection reflex is indeterminate and milk supply is low.       OB History    Para Term  AB Living   5 5 5 0 0 6   SAB IAB Ectopic Multiple Live Births   0 0 0 1 6      # Outcome Date GA Lbr Shashi/2nd Weight Sex Type Anes PTL Lv   5A Term 24 37w2d  2.92 kg (6 lb 7 oz) M CS-LTranv Spinal N SIMONA      Name: Anthony S Said      Apgar1: 5  Apgar5: 7   5B Term 24 37w2d  1.98 kg (4 lb 5.8 oz) M CS-LTranv Spinal N SIMONA      Name: Shady DIAMOND Said      Apgar1: 8  Apgar5: 8   4 Term 21 39w5d 05:30 / 12:51 3.67 kg (8 lb 1.5 oz) M Vag-Spont EPI, IV N SIMONA      Complications: Dysfunctional Labor      Name: LISSETTE HERRERA-RADHA      Apgar1: 8  Apgar5: 9   3 Term 12/04/15 40w0d  2.722 kg (6 lb) F  EPI  SIMONA   2 Term 10/22/13 40w0d  2.722 kg (6 lb) F  None  SIMONA   1 Term 12 40w0d  2.892 kg (6 lb 6 oz) M  EPI  SIMONA      Obstetric Comments   No HTN, GDM, PPD, or PPH. Anemia in pregnancy. First in [place] and 2nd and 3th in [place], [facility] with [facility]       Current Outpatient Medications:     cholecalciferol (VITAMIN D3) 125 mcg (5000 units) capsule, , Disp: , Rfl:     Prenatal Vit-Fe Fumarate-FA (PRENATAL  MULTIVITAMIN W/IRON) 27-0.8 MG tablet, Take 1 tablet by mouth daily, Disp: 90 tablet, Rfl: 3  Past Medical History:   Diagnosis Date    Anemia 02/10/2021    8/4/21: Has started IV iron transfusion  21: hgb 11.7    Family history of diabetes mellitus in mother 2021    NEeds early GCT--pt returned to care at 22 wks--too late for early GCT, will come in a few weeks for visit andGCT  EFW at 20wk--93%    Labor and delivery, indication for care 2021    Normal pregnancy in first trimester 2021    [facility] CNM pt  Partner's name: Janett   [X] Entered on Epic list  [X] NOB folder  [X] Dating LMP c/w 10 week US  [X] First tri screen declined  [ ] QS/AFP ordered declined  [X] Fetal anatomy US ordered  [x ] Rubella immune  [x ] Hep B immune     [NA ] Started ASA   [x ] NO  plan utox in labor   _____________________________________  [ ] EOB folder  [ ] PP Contraception plan: If tubal,consent date:     Thrombocytopenia (H24) 2021: plts 103, needs weekly CBC. Consider anesthesia consult if less than 100.     Vitamin D deficiency 2021    21- pt was prescribed 2,000 at intake. Notify at next appt she should take 4,000 daily.      Past Surgical History:   Procedure Laterality Date     SECTION N/A 3/27/2024    Procedure:  section;  Surgeon: Xena Lewis MD;  Location:  L+D    NO HISTORY OF SURGERY       Family History   Problem Relation Age of Onset    Diabetes Mother     No Known Problems Father     No Known Problems Sister     No Known Problems Brother     No Known Problems Brother     No Known Problems Brother     No Known Problems Brother     No Known Problems Brother     No Known Problems Sister     No Known Problems Sister     No Known Problems Sister      /80   Pulse 89   Wt 69.9 kg (154 lb)   SpO2 98%   Breastfeeding Yes   BMI 26.62 kg/m      Time spent:  Chart review/Pre-chartin min prior to day of service  Face-to-face visit:  77 min    Documentation:  25 min  Total time spent on day of service:  102 min     Maribell Coffey, EDY, CNM, IBCLC

## 2024-07-10 ENCOUNTER — OFFICE VISIT (OUTPATIENT)
Dept: OBGYN | Facility: CLINIC | Age: 29
End: 2024-07-10
Payer: COMMERCIAL

## 2024-07-10 VITALS
BODY MASS INDEX: 26.62 KG/M2 | WEIGHT: 154 LBS | SYSTOLIC BLOOD PRESSURE: 102 MMHG | HEART RATE: 89 BPM | DIASTOLIC BLOOD PRESSURE: 80 MMHG | OXYGEN SATURATION: 98 %

## 2024-07-10 DIAGNOSIS — Z87.59 HISTORY OF TWIN PREGNANCY IN PRIOR PREGNANCY: ICD-10-CM

## 2024-07-10 DIAGNOSIS — O92.79 INSUFFICIENT LACTATION: Primary | ICD-10-CM

## 2024-07-10 PROCEDURE — 84443 ASSAY THYROID STIM HORMONE: CPT | Performed by: ADVANCED PRACTICE MIDWIFE

## 2024-07-10 PROCEDURE — 99215 OFFICE O/P EST HI 40 MIN: CPT | Performed by: ADVANCED PRACTICE MIDWIFE

## 2024-07-10 PROCEDURE — 84146 ASSAY OF PROLACTIN: CPT | Performed by: ADVANCED PRACTICE MIDWIFE

## 2024-07-10 PROCEDURE — 99417 PROLNG OP E/M EACH 15 MIN: CPT | Performed by: ADVANCED PRACTICE MIDWIFE

## 2024-07-10 PROCEDURE — 36415 COLL VENOUS BLD VENIPUNCTURE: CPT | Performed by: ADVANCED PRACTICE MIDWIFE

## 2024-07-10 PROCEDURE — 84439 ASSAY OF FREE THYROXINE: CPT | Performed by: ADVANCED PRACTICE MIDWIFE

## 2024-07-10 NOTE — PATIENT INSTRUCTIONS
"   Possible causes of low milk supply can include not enough breast stimulation (especially in early days), too early or too much formula supplementation, hormonal dysfunction, baby not being able to suck well, or insufficient glandular tissue in mother.  In your case, it is probably not having enough time to feed your babies as often as they need it and pump to get extra for bottles--it can be very challenging to take all of this time for making milk when you have a large family.  Evaluation of low milk supply can involve a breast exam, trying more frequent pumping to see if this improves supply, or bloodwork such as thyroid and prolactin evaluation.  Thyroid imbalance can affect milk supply and that is is treatable.  Prolactin is the hormone of milk-making, and does not  correlate exactly with milk supply, but can sometimes give some information about a possible reason for low milk supply.  There is no \"prolactin supplement\" to take if one's prolactin is low, however, and most of the things to do to increase it are the things people are already doing, such as frequent nursing or pumping.  Today we decided to do both of these tests.  Pump your breasts as often as you can. Sometimes pumping while you have some warmth on your breasts (like a heating pad or microwaved hot pack) is helpful, and gentle massage can also help release more milk.   It is more effective to pump more frequently for shorter time periods than it is to pump less often for longer:  For example, it is better to pump 6 times/day for 15 minutes each than it is to pump 3 times/day for 30 minutes.   Use the pump that you have, or if you would like to rent a hospital-grade pump you can do that for about $88/month.  You can also use your hand pump or your Spectra pump if that works best for you.  There are several supplements and medications that can be used to increase milk supply.  Moringa, goat's rue and fenugreek are some that people have found to " be helpful and have some research evidence supporting their use.  Regarding medications for milk supply, domperidone is one such medication, but this is not available in the [place]. The other option is a prescription for Reglan;  some people find this helpful and some do not.  We use it for only two weeks, as it does have the potential for worsening depression in people who have that history, and with long term use can also cause abnormal muscle twitching.    This is something that you could consider, if you try the other ideas and they do not work for you.  Continue to offer the babies your breast as often as you can--sometimes offering when the babies are sleepy is helpful, or when they are just a little bit hungry.  The more times a day you stimulate your breasts, the more milk you will make.  Follow up with lactation as needed, and pediatric provider as planned.  Terascore can be used for brief questions, but it's important to know that messages are not seen Friday through Sunday. If urgent help is needed, Monday through Friday you can call [phone] and one of our lactation consultants will get the message and respond; if you need a rapid response over a weekend or holiday, it is best to call your on-call maternity or pediatric provider.  Please feel free to schedule a return visit if the concern is more detailed;  telephone visits are also an option if you don't feel you need to be seen in person.     _____________________    How to hand-express breastmilk:    https://[email or web address].org/videos/how-to-express-breastmilk-[place]adian/    Increasing your milk supply:  https://[email or web address].org/videos/increasing-your-milk-supply/      -------------------------------------------------------------------------------------------------  Information for breastfeeding families on Increasing breastmilk supply     Frequent stimulation of the breasts, by breastfeeding or by using a breast pump, during the first few days and  "weeks, is essential to establish an abundant breastmilk supply. If you find your milk supply is low, try the following recommendations. The most effective way to increase milk supply is to boost your own hormones, which you do by stimulating your breasts and removing more milk from them--this increases your prolactin levels and increases supply.  Many people will see an improvement within a few days. Although it may take a month or more to bring your supply up to meet your baby's needs, depending on how low the supply is, we would expect to see steady, gradual improvement.  If you are still having difficulty, please contact the lactation consultant for more support.      More breast stimulation:  the most important thing!  --Breastfeed more often, at least 8-12 times per 24 hours.   --Discontinue the use of a pacifier (so that when the baby wants to suck, they are stimulating the breasts for milk production)  --Try to get in \"one more feeding\" before you go to sleep, even if you have to wake the baby.  --Offer both breasts at each feeding  --\"Switch nursing:\" using each breast twice or three times in a feeding, and using different positions  --\"Top up feeds\" give a short feeding in 10-20 minutes if baby seems hungry  -- Remove milk well by massaging breasts while the baby is feeding  --Try breast compression - pushing milk to baby during a feeding    Avoid these things that can reduce breastmilk supply in some people:  --Smoking  --Caffeine  --Birth control pills and injections, especially those containing estrogen. Progesterone-only methods are not likely to impact milk supply.  --Decongestants such as Sudafed  --Severe weight loss diets  --Mints, parsley, taran in excessive amounts    Use a breast pump  --Consider use of a hospital grade breast pump--these are available for rent, and your provider or lactation consultant can help you to obtain one if needed. (Many breast pumps are marketed as \"hospital grade,\" but " "there are only a few truly hospital-grade pumps, and they generally cost around $2000)  --Pump after feedings or between feedings.  Remember that shorter, more frequent pumping sessions are more helpful than longer sessions that happen less frequently.  Anytime you can squeeze a little time in is helpful!  --Rest 10-15 minutes prior to pumping, eat and drink something.  Be nice to yourself!  During this time trying applying warmth to your breasts and massaging them gently before beginning to pump  --Do hand expression after pumping. This can help bring out more milk, as well as offer extra breast stimulation.  --Try \"power pumping\" for 2 or 3 days. Pumping 12 x a day after feedings, even for a short time. Or, try an hour of pumping for 10min, resting for 10 min, then pumping for another 10 min, etc.,  for a few times a day.     Condition your let-down reflex--sometimes when we are anxious about milk supply, it becomes more and more difficult to release milk, which then impacts supply.  Some ideas:  --Play relaxing music  --Imagine your baby, look at pictures or videos of your baby, smell baby clothing or items you associate with your baby (shampoo, powder, etc)  --Alternatively, if thinking about your baby and their need for milk is stressful instead of relaxing, do something completely different!  Call a friend, play a game, listen to a podcast or a meditation while you pump  --Consider covering the bottles with a blanket or baby socks so you don't watch how quickly the milk is flowing  --Always pump in the same quiet, relaxed place, and set up a pleasant routine  --Do slow, deep, relaxed breathing, relax your shoulders  --Try to let go of the idea that a specific amount of milk is needed;  just doing the pumping at all is helpful    Mother care  --Reduce stress and activity, get help  --Increase fluid intake, but just to thirst.  More water doesn't magically turn into more milk!  --Eat nutritious meals, continue " "to take prenatal vitamins  --Back rubs stimulate nerves that serve the breasts (central part of the spine)  --Increase skin-to-skin holding time with your baby, relax together  --Take a warm, bath, read, meditate, and empty your mind of tasks that need to be done    Herbs, food and supplements   --These may offer help to some women, but frequent milk removal helps much more!  Supplements are not a substitute.    --Moringa (also called malunggay):  this is a leaf that is commonly eaten in [place], and has been shown in a number of studies to increase milk supply.  In this [place] it is found in powder form, often sold in capsules.  It is sold under a number of brand names. A common dose is 250-350 mg three times daily.  --Sources for moringa/malunggay for helping with milk supply:    Brands containing or comprised of moringa:  Go-Lacta  Motherlove Herbal Tincture  Simply Herbal Organics \"Adoptive Lactation Milk-in\"  Rumina Naturals \"Milk A-Plenty\" ([place])  Milkapalooza or Cash Cow by Legendairy  Lush Leche or Milk Machine by Euphoric Herbal  (Or can purchase just moringa itself from [employer] or [employer])     --Fenugreek:  Doses of 3-5 capsules (580-610 mg each) three times per day are commonly recommended. Avoid fenugreek if you are diabetic, hypoglycemic, asthmatic or allergic to peanuts or other legumes or beans. Taken as directed, it may cause a faint maple body odor. That is to be expected and means that the herb is doing its job.   --Ruggiero's yeast: 3 Tablespoons daily, increase by 1/2 teaspoon daily until results are seen  --Torbangun:  a leaf used in [place] for helping with milk supply.  A few studies have found this to be helpful.  Several companies sell this in compounds for increasing mother's milk.  --Shatavari:  a type of asparagus found in [place], also found to help with milk supply.  Available in several compounds made by different companies.  --Oatmeal:  try a bowl daily.  " Barley and quinoa have also been found to be helpful.  --Calcium supplement:  this seems to be particularly helpful for those women who note a decrease in milk supply around their menstrual cycles.  Take 1500 mg of calcium with 750 mg of magnesium daily from midcycle through your period.  --Blessed thistle, goat's rue, or other herbs or beverages such as Mother's Milk Tea taken as directed on the package. Reliable sources of herbs and herbal blends are Motherlove Herbals, Ning Herbs and Legendairy Milk.  --Lactation cookies, bars or snacks:  these are very expensive (often around $20 for one package of mix) and many do not contain anything more special than oatmeal, flaxseed and freeman's yeast, along with sugar and chocolate.  Probably not really worth the money.    --Keep records  --It is important to keep a daily log with the number of pumping sessions, amount obtained amount you are having to supplement your baby and 24 hour totals, this amount is more important that the pumped amount at each session. This will help you see your progress over the days.   --Keep in touch with your health care provider so he/she can monitor your progress over the days and modify advice if necessary.     Potential Other Causes of low milk supply:      Retained placenta  If you are not seeing improvement and you are having any heavy bleeding, discuss the possibility of retained placental fragments with your MD. Small bits of the placenta can secrete enough hormones to prevent the milk from coming in.  There is also a possibility that taking encapsulated placenta may decrease milk supply, although there is little research on this.      Low thyroid  Have you health care provider check your thyroid levels. Low thyroid can affect milk supply. If you have been taking thyroid medication, have your levels checked after delivery, you may need your medication adjusted.     Other resources:     http://www.lowmilksupply.org    Milk Expression  and Pumping for strong milk supply  https://Picomize/790972099    [place] Video demonstrating hand expression (demo begins at about minute 2:00)  [email or web address]-education/breastfeeding/hand-expressing-milk.html    [place]ise Maximizing Milk Production Video:  [email or web address]-education/breastfeeding/maximizing-milk-production.html       ________________________________________________    To obtain hospital grade pump:      1.  Call insurance company to check coverage and if any additional information is needed.  Ask if insurance will cover hospital grade pump, which is pump type     2.  Ask if you can get this pump from [employer], and if not, which medical equipment company you need to use    3.  The clinic can enter an order/prescription for you and it will automatically transmit to [employer].  If you need a paper copy for a different company, please let us know or ask the insurance company to send the form to our clinic and we can fill it out.    Cost for pump rental is around $88/month from [employer] if it is not covered by insurance, plus $60 for the tubing set (if you do not already have a Medela pump)    (Cost if rented from [facility] is about $63/month;  From [facility] is about $70/month)      [employer] rental location:    [facility] (across from [facility]), 3rd floor  [address], [address]  [phone]

## 2024-07-10 NOTE — Clinical Note
Maribell Omer:  I saw your patient, Xiao Vivas,with her twin babies for [facility] at the [facility] today.  thank you for the referral!  The chart is attached;  please see my note.  Please feel free to contact me with any questions.  I look forward to following this pleasant family along with you as needed.  EDY Miranda, CNM, IBCLC

## 2024-07-11 DIAGNOSIS — Z87.59 HISTORY OF TWIN PREGNANCY IN PRIOR PREGNANCY: Primary | ICD-10-CM

## 2024-07-11 DIAGNOSIS — O92.79 INSUFFICIENT LACTATION: ICD-10-CM

## 2024-07-11 LAB
PROLACTIN SERPL 3RD IS-MCNC: 28 NG/ML (ref 5–23)
T4 FREE SERPL-MCNC: 1.11 NG/DL (ref 0.9–1.7)
TSH SERPL DL<=0.005 MIU/L-ACNC: 2.24 UIU/ML (ref 0.3–4.2)

## 2024-10-02 ENCOUNTER — PATIENT OUTREACH (OUTPATIENT)
Dept: ONCOLOGY | Facility: CLINIC | Age: 29
End: 2024-10-02

## 2024-10-02 ENCOUNTER — OFFICE VISIT (OUTPATIENT)
Dept: OBGYN | Facility: CLINIC | Age: 29
End: 2024-10-02
Attending: ADVANCED PRACTICE MIDWIFE
Payer: COMMERCIAL

## 2024-10-02 VITALS
BODY MASS INDEX: 27.48 KG/M2 | DIASTOLIC BLOOD PRESSURE: 74 MMHG | SYSTOLIC BLOOD PRESSURE: 113 MMHG | WEIGHT: 159 LBS | HEART RATE: 81 BPM | TEMPERATURE: 98.1 F

## 2024-10-02 DIAGNOSIS — R22.31 AXILLARY LUMP, RIGHT: Primary | ICD-10-CM

## 2024-10-02 PROCEDURE — 99213 OFFICE O/P EST LOW 20 MIN: CPT | Performed by: ADVANCED PRACTICE MIDWIFE

## 2024-10-02 PROCEDURE — G0463 HOSPITAL OUTPT CLINIC VISIT: HCPCS | Performed by: ADVANCED PRACTICE MIDWIFE

## 2024-10-02 ASSESSMENT — PAIN SCALES - GENERAL: PAINLEVEL: NO PAIN (0)

## 2024-10-02 NOTE — LETTER
10/2/2024       RE: Xiao Vivas  [address]  [place] [address]     Dear Colleague,    Thank you for referring your patient, Xiao Vivas, to the [facility] [place] at [facility]. Please see a copy of my visit note below.    Here w concerns for small lump in right arm pit since she was pregnant  Pt is 6 mos postpartum and stopped breastfeeding her twins 2 mos ago. Lump is painful and does not come and go, stayed the same during breastfeeding months  Here w three sons, no other concerns      Menstrual History:      2/10/2021     2:40 PM 9/14/2023     1:00 PM 9/20/2023    11:20 AM   Menstrual History   LAST MENSTRUAL PERIOD 11/30/2020 7/19/2023 7/19/2023           HISTORY:  Prescription Medications as of 10/2/2024         Rx Number Disp Refills Start End Last Dispensed Date Next Fill Date Owning Pharmacy    cholecalciferol (VITAMIN D3) 125 mcg (5000 units) capsule  -- -- 4/23/2024 --       Class: Historical    Prenatal Vit-Fe Fumarate-FA (PRENATAL MULTIVITAMIN W/IRON) 27-0.8 MG tablet  90 tablet 3 3/5/2024 --   [facility] [place], [place] - [address]    Sig: Take 1 tablet by mouth daily    Class: E-Prescribe    Route: Oral          No Known Allergies  Immunization History   Administered Date(s) Administered     COVID-19 MONOVALENT 12+ (Pfizer) 04/28/2023     COVID-19 Monovalent 12+ (Pfizer 2022) 03/30/2023     HEPATITIS A (PEDS 12M-18Y) 05/27/2010     HPV Quadrivalent 05/27/2010     Hepatitis B, Peds 04/16/2010     Influenza (IIV3) PF 10/06/2011     Influenza Vaccine >6 months,quad, PF 02/10/2021, 11/13/2023     Influenza, Split Virus, Trivalent, Pf (Fluzone\Fluarix) 11/17/2010     Influenza, seasonal, injectable, PF 11/17/2010     Poliovirus, inactivated (IPV) 04/16/2010     TDAP (Adacel,Boostrix) 06/24/2021, 02/06/2024     Td (Adult), Adsorbed 07/29/2010     Typhoid IM 12/01/2020       OB  History    Para Term  AB Living   5 5 5 0 0 6   SAB IAB Ectopic Multiple Live Births   0 0 0 1 6   Obstetric Comments   No HTN, GDM, PPD, or PPH. Anemia in pregnancy. First in [place] and 2nd and 3th in [place], 4th with [facility]     Past Medical History:   Diagnosis Date     Anemia 02/10/2021    8/4/21: Has started IV iron transfusion  21: hgb 11.7     Family history of diabetes mellitus in mother 2021    NEeds early GCT--pt returned to care at 22 wks--too late for early GCT, will come in a few weeks for visit andGCT  EFW at 20wk--93%     Labor and delivery, indication for care 2021     Normal pregnancy in first trimester 2021    [facility] CNM pt  Partner's name: Janett   [X] Entered on Epic list  [X] NOB folder  [X] Dating LMP c/w 10 week US  [X] First tri screen declined  [ ] QS/AFP ordered declined  [X] Fetal anatomy US ordered  [x ] Rubella immune  [x ] Hep B immune     [NA ] Started ASA   [x ] NO  plan utox in labor   _____________________________________  [ ] EOB folder  [ ] PP Contraception plan: If tubal,consent date:      Thrombocytopenia (H) 2021: plts 103, needs weekly CBC. Consider anesthesia consult if less than 100.      Vitamin D deficiency 2021    21- pt was prescribed 2,000 at intake. Notify at next appt she should take 4,000 daily.      Past Surgical History:   Procedure Laterality Date      SECTION N/A 3/27/2024    Procedure:  section;  Surgeon: Xena Lewis MD;  Location:  L+D     NO HISTORY OF SURGERY       Family History   Problem Relation Age of Onset     Diabetes Mother      No Known Problems Father      No Known Problems Sister      No Known Problems Brother      No Known Problems Brother      No Known Problems Brother      No Known Problems Brother      No Known Problems Brother      No Known Problems Sister      No Known Problems Sister      No Known Problems Sister      Social History     Socioeconomic  History     Marital status:      Spouse name: Janett     Number of children: 4   Occupational History     Occupation: stay at home   Tobacco Use     Smoking status: Never     Smokeless tobacco: Never   Vaping Use     Vaping status: Never Used   Substance and Sexual Activity     Alcohol use: Not Currently     Drug use: Never     Sexual activity: Yes     Partners: Male     Social Determinants of Health     Food Insecurity: No Food Insecurity (2/10/2021)    Hunger Vital Sign      Worried About Running Out of Food in the Last Year: Never true      Ran Out of Food in the Last Year: Never true       ROS       No data to display                  EXAM:  Blood pressure 113/74, pulse 81, temperature 98.1  F (36.7  C), weight 72.1 kg (159 lb), not currently breastfeeding. Body mass index is 27.48 kg/m .  General - pleasant female in no acute distress.  Skin - no suspicious lesions or rashes  EENT-  PERRLA, euthyroid with out palpable nodules  Neck - supple without lymphadenopathy.  Lungs - clear to auscultation bilaterally.  Heart - regular rate and rhythm without murmur.  Abdomen - soft, nontender, nondistended, no masses or organomegaly noted.  Musculoskeletal - no gross deformities.  Neurological - normal strength, sensation, and mental status.    Breast Exam:  Breast: Without visible skin changes. No dimpling or lesions seen.   Breasts supple, non-tender with palpation, no dominant mass, nodularity, or nipple discharge noted bilaterally. Axillary node negative on Left side, small node/lump in right axilla.        ASSESSMENT:    Encounter Diagnosis   Name Primary?     Axillary lump, right Yes          PLAN:   Orders Placed This Encounter   Procedures     Breast Provider  Referral      Discussed small mass, hard to palpate. Likely node, but pt has been worried and has noted mass for 8 mos  Additional teaching done at this visit regarding self breast exam.    Pt due for annual exam summer 2025.  Maribell Smith  [place], DELGADO, APRN, CNM, FACNM                  Again, thank you for allowing me to participate in the care of your patient.      Sincerely,    EDY ReyesM

## 2024-10-02 NOTE — PROGRESS NOTES
Here w concerns for small lump in right arm pit since she was pregnant  Pt is 6 mos postpartum and stopped breastfeeding her twins 2 mos ago. Lump is painful and does not come and go, stayed the same during breastfeeding months  Here w three sons, no other concerns      Menstrual History:      2/10/2021     2:40 PM 2023     1:00 PM 2023    11:20 AM   Menstrual History   LAST MENSTRUAL PERIOD 2020           HISTORY:  Prescription Medications as of 10/2/2024         Rx Number Disp Refills Start End Last Dispensed Date Next Fill Date Owning Pharmacy    cholecalciferol (VITAMIN D3) 125 mcg (5000 units) capsule  -- -- 2024 --       Class: Historical    Prenatal Vit-Fe Fumarate-FA (PRENATAL MULTIVITAMIN W/IRON) 27-0.8 MG tablet  90 tablet 3 3/5/2024 --   [place], [place] - [address]    Sig: Take 1 tablet by mouth daily    Class: E-Prescribe    Route: Oral          No Known Allergies  Immunization History   Administered Date(s) Administered    COVID-19 MONOVALENT 12+ (Pfizer) 2023    COVID-19 Monovalent 12+ (Pfizer ) 2023    HEPATITIS A (PEDS 12M-18Y) 2010    HPV Quadrivalent 2010    Hepatitis B, Peds 2010    Influenza (IIV3) PF 10/06/2011    Influenza Vaccine >6 months,quad, PF 02/10/2021, 2023    Influenza, Split Virus, Trivalent, Pf (Fluzone\Fluarix) 2010    Influenza, seasonal, injectable, PF 2010    Poliovirus, inactivated (IPV) 2010    TDAP (Adacel,Boostrix) 2021, 2024    Td (Adult), Adsorbed 2010    Typhoid IM 2020       OB History    Para Term  AB Living   5 5 5 0 0 6   SAB IAB Ectopic Multiple Live Births   0 0 0 1 6   Obstetric Comments   No HTN, GDM, PPD, or PPH. Anemia in pregnancy. First in [place] and 2nd and 3th in [place], 4th with [facility]     Past Medical History:   Diagnosis Date    Anemia 02/10/2021    8/4/21: Has started IV iron  transfusion  21: hgb 11.7    Family history of diabetes mellitus in mother 2021    NEeds early GCT--pt returned to care at 22 wks--too late for early GCT, will come in a few weeks for visit andGCT  EFW at 20wk--93%    Labor and delivery, indication for care 2021    Normal pregnancy in first trimester 2021    [facility] CNM pt  Partner's name: Janett   [X] Entered on Epic list  [X] NOB folder  [X] Dating LMP c/w 10 week US  [X] First tri screen declined  [ ] QS/AFP ordered declined  [X] Fetal anatomy US ordered  [x ] Rubella immune  [x ] Hep B immune     [NA ] Started ASA   [x ] NO  plan utox in labor   _____________________________________  [ ] EOB folder  [ ] PP Contraception plan: If tubal,consent date:     Thrombocytopenia (H) 2021: plts 103, needs weekly CBC. Consider anesthesia consult if less than 100.     Vitamin D deficiency 2021    21- pt was prescribed 2,000 at intake. Notify at next appt she should take 4,000 daily.      Past Surgical History:   Procedure Laterality Date     SECTION N/A 3/27/2024    Procedure:  section;  Surgeon: Xena Lewis MD;  Location:  L+D    NO HISTORY OF SURGERY       Family History   Problem Relation Age of Onset    Diabetes Mother     No Known Problems Father     No Known Problems Sister     No Known Problems Brother     No Known Problems Brother     No Known Problems Brother     No Known Problems Brother     No Known Problems Brother     No Known Problems Sister     No Known Problems Sister     No Known Problems Sister      Social History     Socioeconomic History    Marital status:      Spouse name: Janett    Number of children: 4   Occupational History    Occupation: stay at home   Tobacco Use    Smoking status: Never    Smokeless tobacco: Never   Vaping Use    Vaping status: Never Used   Substance and Sexual Activity    Alcohol use: Not Currently    Drug use: Never    Sexual activity: Yes     Partners:  Male     Social Determinants of Health     Food Insecurity: No Food Insecurity (2/10/2021)    Hunger Vital Sign     Worried About Running Out of Food in the Last Year: Never true     Ran Out of Food in the Last Year: Never true       ROS       No data to display                  EXAM:  Blood pressure 113/74, pulse 81, temperature 98.1  F (36.7  C), weight 72.1 kg (159 lb), not currently breastfeeding. Body mass index is 27.48 kg/m .  General - pleasant female in no acute distress.  Skin - no suspicious lesions or rashes  EENT-  PERRLA, euthyroid with out palpable nodules  Neck - supple without lymphadenopathy.  Lungs - clear to auscultation bilaterally.  Heart - regular rate and rhythm without murmur.  Abdomen - soft, nontender, nondistended, no masses or organomegaly noted.  Musculoskeletal - no gross deformities.  Neurological - normal strength, sensation, and mental status.    Breast Exam:  Breast: Without visible skin changes. No dimpling or lesions seen.   Breasts supple, non-tender with palpation, no dominant mass, nodularity, or nipple discharge noted bilaterally. Axillary node negative on Left side, small node/lump in right axilla.        ASSESSMENT:    Encounter Diagnosis   Name Primary?    Axillary lump, right Yes          PLAN:   Orders Placed This Encounter   Procedures    Breast Provider  Referral      Discussed small mass, hard to palpate. Likely node, but pt has been worried and has noted mass for 8 mos  Additional teaching done at this visit regarding self breast exam.    Pt due for annual exam summer 2025.  Maribell Pettit, DNP, APRN, CNM, FACNM

## 2024-10-02 NOTE — PROGRESS NOTES
New Patient Oncology Nurse Navigator Note     Referring provider: Maribell Pettit APRN CNM      Referring Clinic/Organization: [facility]      Referred to (specialty:) Breast Provider Referral      Date Referral Received: October 2, 2024     Evaluation for:  R22.31 (ICD-10-CM) - Axillary lump, right     Clinical History (per Nurse review of records provided):      Xiao is a 29 year old female who presented to ordering provider's office with concerns for small lump in right arm pit since she was pregnant. She is 6 months postpartum and stopped breastfeeding her twins 2 months ago. Lump is painful and does not come and go, stayed the same during breastfeeding months    Breast Exam:  Breast: Without visible skin changes. No dimpling or lesions seen. Breasts supple, non-tender with palpation, no dominant mass, nodularity, or nipple discharge noted bilaterally. Axillary node negative on Left side, small node/lump in right axilla.      Discussed small mass, hard to palpate. Likely node, but patient has been worried and has noted mass for 8 months.     No imaging has been ordered.     10/3/24 1151 - With assistance of [facility]  [facility] #[id number], telephoned and left voice message requesting call back. Writer received referral, reviewed for appropriate plan, and referral transferred to New Patient Scheduling for completion.     Patient resides in [place], [place].    Records Location: See Bookmarked material

## 2024-10-02 NOTE — NURSING NOTE
6 months delivered noticed lump in right arm pit at 8 months pregtnant  still there  was bigger with nursing    never went away.

## 2025-01-27 NOTE — PROGRESS NOTES
NEW CONSULTATION   2025     Xiao Vivas is a 30 year old woman who presents with right axillary mass. She was referred by Dr. Pettit.    HPI:    She is 10 months post partum. She is not breast feeding. She self palpated a right axillary mass when she was 8 months pregnant. She denies any changes in the right axillary mass. She denies any breast mass, skin change, nipple inversion, or nipple discharge.     BREAST-SPECIFIC HISTORY:    Previous breast imaging: No  - 25 b/l Dmammo and right axillary ultrasound    Prior breast biopsies/surgeries: No    Prior history of breast cancer or DCIS: No  Prior radiation history: No   Breast density:     GYN HISTORY:  . Age at 1st pregnancy: 17.   Age at menarche:   Menopausal: premenopausal.   Menopausal hormone replacement therapy: No     RISK ASSESSMENT: < 20% lifetime risk     FAMILY HISTORY:  Breast ca: No      PAST MEDICAL HISTORY  Past Medical History:   Diagnosis Date    Anemia 02/10/2021    8/4/21: Has started IV iron transfusion  21: hgb 11.7    Family history of diabetes mellitus in mother 2021    NEeds early GCT--pt returned to care at 22 wks--too late for early GCT, will come in a few weeks for visit andGCT  EFW at 20wk--93%    Labor and delivery, indication for care 2021    Normal pregnancy in first trimester 2021    [facility] CNM pt  Partner's name: Janett   [X] Entered on Epic list  [X] NOB folder  [X] Dating LMP c/w 10 week US  [X] First tri screen declined  [ ] QS/AFP ordered declined  [X] Fetal anatomy US ordered  [x ] Rubella immune  [x ] Hep B immune     [NA ] Started ASA   [x ] NO  plan utox in labor   _____________________________________  [ ] EOB folder  [ ] PP Contraception plan: If tubal,consent date:     Thrombocytopenia 2021: plts 103, needs weekly CBC. Consider anesthesia consult if less than 100.     Vitamin D deficiency 2021    21- pt was prescribed 2,000 at intake. Notify at next appt  "she should take 4,000 daily.      PAST SURGICAL HISTORY   Past Surgical History:   Procedure Laterality Date     SECTION N/A 3/27/2024    Procedure:  section;  Surgeon: Xena Lewis MD;  Location: [facility]    NO HISTORY OF SURGERY       MEDICATIONS  Current Outpatient Medications   Medication Sig Dispense Refill    cholecalciferol (VITAMIN D3) 125 mcg (5000 units) capsule       Prenatal Vit-Fe Fumarate-FA (PRENATAL MULTIVITAMIN W/IRON) 27-0.8 MG tablet Take 1 tablet by mouth daily 90 tablet 3     ALLERGIES   No Known Allergies     SOCIAL HISTORY:    ROS:   Change in vision No  Headaches: no  Respiratory: No shortness of breath, dyspnea on exertion, cough, or hemoptysis   Cardiovascular: negative   Gastrointestinal: negative Abdominal pain: no  Breast: right axillary mass  Musculoskeletal: negative Joint pain No Back pain: no  Psychiatric: negative  Hematologic/Lymphatic/Immunologic: negative  Endocrine: negative    EXAM  /72 (BP Location: Right arm, Patient Position: Sitting, Cuff Size: Adult Regular)   Pulse 98   Temp 99.9  F (37.7  C) (Oral)   Resp 20   Ht 1.543 m (5' 0.75\")   Wt 72.7 kg (160 lb 4.8 oz)   LMP 2025 (Approximate)   SpO2 98%   BMI 30.54 kg/m     PHYSICAL EXAM  Respiratory: breathing non labored.   Breasts: Examination was done in both the upright and supine positions.  Breasts are symmetrical . No masses noted. No skin or nipple changes. No nipple discharge.   Subtle skin thickening in right axillary, no erythema.   No clavicular or axillary lymphadenopathy. Left anterior cervical lymph node measuring 1.5 cm, she reports a recent cough.     INVESTIGATIONS:    25 bilateral diagnostic mammogram and right axillary ultrasound: per Radiology no concerning findings, final report pending.     ASSESSMENT/PLAN:    Xiao Vivas is a 30 year old woman with right axillary lump. Exam and ultrasound consistent with epidermoid cyst. No inflammation or infection of " the area today. Follow up with any new or worsening changes. Be familiar with your breast and how they normally feel and appear. Promptly report any changes to your healthcare provider. Begin screening mammograms at age 40.     Miladis Quintana PA-C    30 minutes spent on the date of the encounter doing chart review, review of test results, interpretation of tests, patient visit and documentation.

## 2025-01-29 ENCOUNTER — ONCOLOGY VISIT (OUTPATIENT)
Dept: SURGERY | Facility: CLINIC | Age: 30
End: 2025-01-29
Attending: ADVANCED PRACTICE MIDWIFE
Payer: COMMERCIAL

## 2025-01-29 ENCOUNTER — ANCILLARY PROCEDURE (OUTPATIENT)
Dept: MAMMOGRAPHY | Facility: CLINIC | Age: 30
End: 2025-01-29
Payer: COMMERCIAL

## 2025-01-29 ENCOUNTER — VIRTUAL VISIT (OUTPATIENT)
Dept: INTERPRETER SERVICES | Facility: CLINIC | Age: 30
End: 2025-01-29

## 2025-01-29 VITALS
OXYGEN SATURATION: 98 % | DIASTOLIC BLOOD PRESSURE: 72 MMHG | TEMPERATURE: 99.9 F | HEIGHT: 61 IN | WEIGHT: 160.3 LBS | SYSTOLIC BLOOD PRESSURE: 105 MMHG | BODY MASS INDEX: 30.26 KG/M2 | HEART RATE: 98 BPM | RESPIRATION RATE: 20 BRPM

## 2025-01-29 DIAGNOSIS — R22.31 MASS OF RIGHT AXILLA: ICD-10-CM

## 2025-01-29 DIAGNOSIS — R22.31 AXILLARY LUMP, RIGHT: ICD-10-CM

## 2025-01-29 PROCEDURE — G0279 TOMOSYNTHESIS, MAMMO: HCPCS | Performed by: RADIOLOGY

## 2025-01-29 PROCEDURE — G0463 HOSPITAL OUTPT CLINIC VISIT: HCPCS | Performed by: PHYSICIAN ASSISTANT

## 2025-01-29 PROCEDURE — T1013 SIGN LANG/ORAL INTERPRETER: HCPCS | Mod: GT,TEL,95

## 2025-01-29 PROCEDURE — 77066 DX MAMMO INCL CAD BI: CPT | Performed by: RADIOLOGY

## 2025-01-29 ASSESSMENT — PAIN SCALES - GENERAL: PAINLEVEL_OUTOF10: MODERATE PAIN (4)

## 2025-01-29 NOTE — NURSING NOTE
"Oncology Rooming Note    January 29, 2025 9:54 AM   Xiao Vivas is a 30 year old female who presents for:    Chief Complaint   Patient presents with    Oncology Clinic Visit     New eval Axillary lump      Initial Vitals: /72 (BP Location: Right arm, Patient Position: Sitting, Cuff Size: Adult Regular)   Pulse 98   Temp 99.9  F (37.7  C) (Oral)   Resp 20   Ht 1.543 m (5' 0.75\")   Wt 72.7 kg (160 lb 4.8 oz)   LMP 01/06/2025 (Approximate)   SpO2 98%   BMI 30.54 kg/m   Estimated body mass index is 30.54 kg/m  as calculated from the following:    Height as of this encounter: 1.543 m (5' 0.75\").    Weight as of this encounter: 72.7 kg (160 lb 4.8 oz). Body surface area is 1.77 meters squared.  Moderate Pain (4) Comment: Data Unavailable   Patient's last menstrual period was 01/06/2025 (approximate).  Allergies reviewed: Yes  Medications reviewed: Yes    Medications: Medication refills not needed today.  Pharmacy name entered into [employer]:    [facility] #[id number] - [place], [place] - [address] AT [address] - [address]    Frailty Screening:   Is the patient here for a new oncology consult visit in cancer care? 1. Yes. Over the past month, have you experienced difficulty or required a caregiver to assist with:   1. Balance, walking or general mobility (including any falls)? NO  2. Completion of self-care tasks such as bathing, dressing, toileting, grooming/hygiene?  NO  3. Concentration or memory that affects your daily life?  NO       Clinical concerns: none       Fabiola Hunt             "

## 2025-01-29 NOTE — LETTER
2025      Xiao Vivas  [address]  [place] [address]      Dear Colleague,    Thank you for referring your patient, Xiao Vivas, to the [facility]. Please see a copy of my visit note below.    NEW CONSULTATION   2025     Xiao Vivas is a 30 year old woman who presents with right axillary mass. She was referred by Dr. Pettit.    HPI:    She is 10 months post partum. She is not breast feeding. She self palpated a right axillary mass when she was 8 months pregnant. She denies any changes in the right axillary mass. She denies any breast mass, skin change, nipple inversion, or nipple discharge.     BREAST-SPECIFIC HISTORY:    Previous breast imaging: No  - 25 b/l Dmammo and right axillary ultrasound    Prior breast biopsies/surgeries: No    Prior history of breast cancer or DCIS: No  Prior radiation history: No   Breast density:     GYN HISTORY:  . Age at 1st pregnancy: 17.   Age at menarche:   Menopausal: premenopausal.   Menopausal hormone replacement therapy: No     RISK ASSESSMENT: < 20% lifetime risk     FAMILY HISTORY:  Breast ca: No      PAST MEDICAL HISTORY  Past Medical History:   Diagnosis Date     Anemia 02/10/2021    8/4/21: Has started IV iron transfusion  21: hgb 11.7     Family history of diabetes mellitus in mother 2021    NEeds early GCT--pt returned to care at 22 wks--too late for early GCT, will come in a few weeks for visit andGCT  EFW at 20wk--93%     Labor and delivery, indication for care 2021     Normal pregnancy in first trimester 2021    [facility] CNM pt  Partner's name: Jantet   [X] Entered on Epic list  [X] NOB folder  [X] Dating LMP c/w 10 week US  [X] First tri screen declined  [ ] QS/AFP ordered declined  [X] Fetal anatomy US ordered  [x ] Rubella immune  [x ] Hep B immune     [NA ] Started ASA   [x ] NO  plan utox in labor   _____________________________________  [ ] EOB folder  [ ] PP  "Contraception plan: If tubal,consent date:      Thrombocytopenia 2021: plts 103, needs weekly CBC. Consider anesthesia consult if less than 100.      Vitamin D deficiency 2021    21- pt was prescribed 2,000 at intake. Notify at next appt she should take 4,000 daily.      PAST SURGICAL HISTORY   Past Surgical History:   Procedure Laterality Date      SECTION N/A 3/27/2024    Procedure:  section;  Surgeon: Xena Lewis MD;  Location: [facility]     NO HISTORY OF SURGERY       MEDICATIONS  Current Outpatient Medications   Medication Sig Dispense Refill     cholecalciferol (VITAMIN D3) 125 mcg (5000 units) capsule        Prenatal Vit-Fe Fumarate-FA (PRENATAL MULTIVITAMIN W/IRON) 27-0.8 MG tablet Take 1 tablet by mouth daily 90 tablet 3     ALLERGIES   No Known Allergies     SOCIAL HISTORY:    ROS:   Change in vision No  Headaches: no  Respiratory: No shortness of breath, dyspnea on exertion, cough, or hemoptysis   Cardiovascular: negative   Gastrointestinal: negative Abdominal pain: no  Breast: right axillary mass  Musculoskeletal: negative Joint pain No Back pain: no  Psychiatric: negative  Hematologic/Lymphatic/Immunologic: negative  Endocrine: negative    EXAM  /72 (BP Location: Right arm, Patient Position: Sitting, Cuff Size: Adult Regular)   Pulse 98   Temp 99.9  F (37.7  C) (Oral)   Resp 20   Ht 1.543 m (5' 0.75\")   Wt 72.7 kg (160 lb 4.8 oz)   LMP 2025 (Approximate)   SpO2 98%   BMI 30.54 kg/m     PHYSICAL EXAM  Respiratory: breathing non labored.   Breasts: Examination was done in both the upright and supine positions.  Breasts are symmetrical . No masses noted. No skin or nipple changes. No nipple discharge.   Subtle skin thickening in right axillary, no erythema.   No clavicular, cervical, or axillary lymphadenopathy.     INVESTIGATIONS:    25 bilateral diagnostic mammogram and right axillary ultrasound: per Radiology no concerning findings, " final report pending.     ASSESSMENT/PLAN:    Xiao Vivas is a 30 year old woman with right axillary lump. Exam and ultrasound consistent with epidermoid cyst. No inflammation or infection of the area today. Follow up with any new or worsening changes. Be familiar with your breast and how they normally feel and appear. Promptly report any changes to your healthcare provider. Begin screening mammograms at age 40.     Miladis Quintana PA-C    30 minutes spent on the date of the encounter doing chart review, review of test results, interpretation of tests, patient visit and documentation.        Again, thank you for allowing me to participate in the care of your patient.        Sincerely,        Miladis Quintana PA-C    Electronically signed

## (undated) DEVICE — STOCKING SLEEVE COMPRESSION CALF LG

## (undated) DEVICE — CATH TRAY FOLEY SURESTEP 16FR WDRAIN BAG STLK LATEX A300316A

## (undated) DEVICE — STRAP KNEE/BODY 31143004

## (undated) DEVICE — SU VICRYL 4-0 PS-2 18" UND J496G

## (undated) DEVICE — SU MONOCRYL 0 CT-1 36" Y346H

## (undated) DEVICE — PACK C-SECTION LF PL15OTA83B

## (undated) DEVICE — GLOVE ESTEEM POWDER FREE SMT 6.5  2D72PT65

## (undated) DEVICE — PREP CHLORAPREP 26ML TINTED ORANGE  260815

## (undated) DEVICE — GLOVE PROTEXIS BLUE W/NEU-THERA 7.0  2D73EB70

## (undated) DEVICE — SOL NACL 0.9% IRRIG 1000ML BOTTLE 07138-09

## (undated) DEVICE — SPONGE SURGIFOAM 100 1974

## (undated) DEVICE — SOL WATER IRRIG 1000ML BOTTLE 07139-09

## (undated) DEVICE — SU VICRYL 0 CT-1 36" J346H

## (undated) DEVICE — ESU PENCIL W/SMOKE EVAC NEPTUNE STRYKER 0703-046-000

## (undated) RX ORDER — PROPOFOL 10 MG/ML
INJECTION, EMULSION INTRAVENOUS
Status: DISPENSED
Start: 2024-03-27

## (undated) RX ORDER — PHENYLEPHRINE HCL IN 0.9% NACL 50MG/250ML
PLASTIC BAG, INJECTION (ML) INTRAVENOUS
Status: DISPENSED
Start: 2024-03-27

## (undated) RX ORDER — FENTANYL CITRATE 50 UG/ML
INJECTION, SOLUTION INTRAMUSCULAR; INTRAVENOUS
Status: DISPENSED
Start: 2024-03-27

## (undated) RX ORDER — FENTANYL CITRATE-0.9 % NACL/PF 10 MCG/ML
PLASTIC BAG, INJECTION (ML) INTRAVENOUS
Status: DISPENSED
Start: 2024-03-27

## (undated) RX ORDER — OXYTOCIN/0.9 % SODIUM CHLORIDE 30/500 ML
PLASTIC BAG, INJECTION (ML) INTRAVENOUS
Status: DISPENSED
Start: 2024-03-27

## (undated) RX ORDER — MORPHINE SULFATE 1 MG/ML
INJECTION, SOLUTION EPIDURAL; INTRATHECAL; INTRAVENOUS
Status: DISPENSED
Start: 2024-03-27

## (undated) RX ORDER — CEFAZOLIN SODIUM 1 G/3ML
INJECTION, POWDER, FOR SOLUTION INTRAMUSCULAR; INTRAVENOUS
Status: DISPENSED
Start: 2024-03-27

## (undated) RX ORDER — ONDANSETRON 2 MG/ML
INJECTION INTRAMUSCULAR; INTRAVENOUS
Status: DISPENSED
Start: 2024-03-27

## (undated) RX ORDER — BUPIVACAINE HYDROCHLORIDE 2.5 MG/ML
INJECTION, SOLUTION EPIDURAL; INFILTRATION; INTRACAUDAL
Status: DISPENSED
Start: 2024-03-27

## (undated) RX ORDER — KETOROLAC TROMETHAMINE 30 MG/ML
INJECTION, SOLUTION INTRAMUSCULAR; INTRAVENOUS
Status: DISPENSED
Start: 2024-03-27